# Patient Record
Sex: FEMALE | Race: BLACK OR AFRICAN AMERICAN | NOT HISPANIC OR LATINO | Employment: FULL TIME | URBAN - METROPOLITAN AREA
[De-identification: names, ages, dates, MRNs, and addresses within clinical notes are randomized per-mention and may not be internally consistent; named-entity substitution may affect disease eponyms.]

---

## 2017-01-07 ENCOUNTER — HOSPITAL ENCOUNTER (EMERGENCY)
Facility: HOSPITAL | Age: 42
Discharge: HOME | End: 2017-01-07
Attending: EMERGENCY MEDICINE

## 2017-01-07 VITALS
DIASTOLIC BLOOD PRESSURE: 72 MMHG | HEIGHT: 65 IN | BODY MASS INDEX: 38.32 KG/M2 | HEART RATE: 78 BPM | TEMPERATURE: 98 F | OXYGEN SATURATION: 99 % | SYSTOLIC BLOOD PRESSURE: 135 MMHG | WEIGHT: 230 LBS | RESPIRATION RATE: 18 BRPM

## 2017-01-07 DIAGNOSIS — T46.4X5A COUGH DUE TO ACE INHIBITOR: ICD-10-CM

## 2017-01-07 DIAGNOSIS — J06.9 UPPER RESPIRATORY TRACT INFECTION, UNSPECIFIED TYPE: Primary | ICD-10-CM

## 2017-01-07 DIAGNOSIS — R05.8 COUGH DUE TO ACE INHIBITOR: ICD-10-CM

## 2017-01-07 DIAGNOSIS — R05.9 COUGH IN ADULT: ICD-10-CM

## 2017-01-07 LAB
DEPRECATED S PYO AG THROAT QL EIA: NEGATIVE
FLUAV AG SPEC QL IA: NEGATIVE
FLUBV AG SPEC QL IA: NEGATIVE
SPECIMEN SOURCE: NORMAL

## 2017-01-07 PROCEDURE — 87081 CULTURE SCREEN ONLY: CPT

## 2017-01-07 PROCEDURE — 87400 INFLUENZA A/B EACH AG IA: CPT

## 2017-01-07 PROCEDURE — 99284 EMERGENCY DEPT VISIT MOD MDM: CPT

## 2017-01-07 PROCEDURE — 87880 STREP A ASSAY W/OPTIC: CPT

## 2017-01-07 RX ORDER — PROMETHAZINE HYDROCHLORIDE AND CODEINE PHOSPHATE 6.25; 1 MG/5ML; MG/5ML
5 SOLUTION ORAL EVERY 4 HOURS PRN
Qty: 180 ML | Refills: 0 | Status: SHIPPED | OUTPATIENT
Start: 2017-01-07 | End: 2017-01-17

## 2017-01-07 NOTE — ED PROVIDER NOTES
Encounter Date: 1/7/2017       History     Chief Complaint   Patient presents with    Cough     cough, hoarse, cold symptoms for one week, worse this morning     Review of patient's allergies indicates:   Allergen Reactions    Penicillins Nausea And Vomiting     + dizziness     HPI Comments: 42 y/o female presents to the ED with complaints of cough and hoarse for 1 week.  Cough is worse at night.  Patient denies fever, rash abdominal pain, nausea, vomiting, diarrhea, dysuria, CP, SOB, numbness, weakness, tingling, ill contacts, recent travel or any other complaints.  She rates her pain as 4/10-worse with coughing.  She has tried multiple OTC medications with no relief.      The history is provided by the patient.     Past Medical History   Diagnosis Date    Graves disease     History of kidney stones     Hypertension      No past medical history pertinent negatives.  Past Surgical History   Procedure Laterality Date    Facial reconstruction surgery      Hysterectomy      Facial reconstruction surgery      Facial reconstruction surgery       No family history on file.  Social History   Substance Use Topics    Smoking status: Former Smoker     Packs/day: 0.50    Smokeless tobacco: Never Used    Alcohol use Yes      Comment: occasionally     Review of Systems   Constitutional: Negative for chills and fever.   HENT: Negative for congestion, ear pain, rhinorrhea, sore throat and trouble swallowing.         Hoarse   Eyes: Negative for pain, discharge and redness.   Respiratory: Positive for cough. Negative for shortness of breath.    Cardiovascular: Negative for chest pain.   Gastrointestinal: Negative for abdominal pain, diarrhea, nausea and vomiting.   Genitourinary: Negative for decreased urine volume and dysuria.   Musculoskeletal: Negative for back pain, neck pain and neck stiffness.   Skin: Negative for rash.   Neurological: Negative for dizziness, weakness, light-headedness, numbness and headaches.    Psychiatric/Behavioral: Negative for confusion.       Physical Exam   Initial Vitals   BP Pulse Resp Temp SpO2   01/07/17 0848 01/07/17 0848 01/07/17 0848 01/07/17 0848 01/07/17 0848   191/90 72 16 98.1 °F (36.7 °C) 100 %     Physical Exam    Nursing note and vitals reviewed.  Constitutional: Vital signs are normal. She appears well-developed.  Non-toxic appearance. She does not appear ill.   HENT:   Head: Normocephalic and atraumatic.   Right Ear: Tympanic membrane normal.   Left Ear: Tympanic membrane normal.   Nose: Mucosal edema present.   Mouth/Throat: Oropharynx is clear and moist and mucous membranes are normal.   Eyes: Conjunctivae are normal.   Neck: Normal range of motion.   Cardiovascular: Normal rate and regular rhythm.   Pulmonary/Chest: Effort normal and breath sounds normal. She exhibits no tenderness.   Abdominal: Soft. There is no tenderness.   Neurological: She is alert and oriented to person, place, and time. Gait normal. GCS eye subscore is 4. GCS verbal subscore is 5. GCS motor subscore is 6.   Skin: Skin is warm, dry and intact. No rash noted.   Psychiatric: She has a normal mood and affect. Her speech is normal and behavior is normal. Judgment and thought content normal.         ED Course   Procedures  Labs Reviewed   THROAT SCREEN, RAPID   CULTURE, STREP A,  THROAT   INFLUENZA A AND B ANTIGEN             Medical Decision Making:   Initial Assessment:   Omkar Gan, a nontoxic/well appearing, afebrile, 41 y.o. female, presented to the ED with c/o cough. ROS positive for cough and hoarseness.  ROS negative for denies fever, rash abdominal pain, nausea, vomiting, diarrhea, dysuria, CP, SOB, numbness, weakness, tingling, ill contacts, recent travel or any other complaints. Physical exam reveals HR WNL, lungs CTA bilaterally, TM WNL, normal oropharynx.    DDX: URI, pneumonia, Flu, Strep    ED management: CXR, Influenza, Strep    Impression/Plan: CXR revealed no evidence of infiltrate,  effusion, or pneumothorax; Influenza and Strep negative; RX Phenergan with codiene, OTC Zyrtec, Mucinex DM, and Doxylamine     Patient will follow up with her PCP or the [facility] clinic in 2 days for a recheck.  Precautions on when to return to the ED given.  Patient verbalizes understanding and agrees with current treatment plan.    I have discussed this patient with Dr. Stoll who is in agreement with my assessment and plan.     Clinical Tests:   Lab Tests: Ordered and Reviewed  Radiological Study: Ordered and Reviewed              Attending Attestation:     Physician Attestation Statement for NP/PA:   I have conducted a face to face encounter with this patient in addition to the NP/PA, due to NP/PA Request    Other NP/PA Attestation Additions:    History of Present Illness: Cough and cold symptoms x 1 week, no fever    Medical Decision Making: No source of infection on exam.  Negative flu and strep and no pneumonia on CXR.  Treat symptomatically.                 ED Course     Clinical Impression:   URI, Cough        ED [facility]  01/07/17 1102       Crystal Stoll MD  01/19/17 1243

## 2017-01-07 NOTE — ED AVS SNAPSHOT
[facility] [address]  [place] [address]               Omkar Gan   2017  9:38 AM   ED    Description:  Female : 1975   Department:  [facility]           Your Care was Coordinated By:     Provider Role From To    Crystal Stoll MD Attending Provider 17 --    ED [facility] Nurse Practitioner 17 --      Reason for Visit     Cough           Diagnoses this Visit        Comments    Upper respiratory tract infection, unspecified type    -  Primary     Cough due to ACE inhibitor         Cough in adult           ED Disposition     None           To Do List           Follow-up Information     Follow up with Savanna Pelaez MD. Schedule an appointment as soon as possible for a visit in 2 days.    Specialty:  Family Medicine    Contact information:    [address] [facility] [address] [phone]         These Medications        Disp Refills Start End    promethazine-codeine 6.25-10 mg/5 ml (PHENERGAN WITH CODEINE) 6.25-10 mg/5 mL syrup 180 mL 0 2017    Take 5 mLs by mouth every 4 (four) hours as needed for Cough. - Oral      [facility]     [facility] Care [facility] -  Assistance  Registered nurses in the [facility] provide clinical advisement, health education, appointment booking, and other advisory services.  Call for this free service at [phone].             Medications           Message regarding Medications     Verify the changes and/or additions to your medication regime listed below are the same as discussed with your clinician today.  If any of these changes or additions are incorrect, please notify your healthcare provider.        START taking these NEW medications        Refills    promethazine-codeine 6.25-10 mg/5 ml (PHENERGAN WITH CODEINE) 6.25-10 mg/5 mL syrup 0    Sig: Take 5 mLs by mouth every 4 (four) hours as needed for Cough.    Class: Print    Route:  "Oral      STOP taking these medications     hydrocodone-acetaminophen 5-325mg (NORCO) 5-325 mg per tablet Take 1 tablet by mouth every 6 (six) hours as needed for Pain.    ibuprofen (ADVIL,MOTRIN) 200 MG tablet Take 2 tablets (400 mg total) by mouth every 6 (six) hours as needed for Pain.    cyanocobalamin (VITAMIN B-12) 1000 MCG tablet Take 100 mcg by mouth once daily.    multivitamin with minerals tablet Take 1 tablet by mouth once daily.           Verify that the below list of medications is an accurate representation of the medications you are currently taking.  If none reported, the list may be blank. If incorrect, please contact your healthcare provider. Carry this list with you in case of emergency.           Current Medications     promethazine-codeine 6.25-10 mg/5 ml (PHENERGAN WITH CODEINE) 6.25-10 mg/5 mL syrup Take 5 mLs by mouth every 4 (four) hours as needed for Cough.           Clinical Reference Information           Your Vitals Were     BP Pulse Temp Resp Height Weight    135/72 78 98.1 °F (36.7 °C) (Oral) 18 5' 5" (1.651 m) 104.3 kg (230 lb)    SpO2 BMI             99% 38.27 kg/m2         Allergies as of 1/7/2017        Reactions    Penicillins Nausea And Vomiting    + dizziness      Immunizations Administered on Date of Encounter - 1/7/2017     None      ED Micro, Lab, POCT     Start Ordered       Status Ordering Provider    01/07/17 0954 01/07/17 0953  Rapid strep screen  STAT      Final result     01/07/17 0954 01/07/17 0953  Influenza antigen Nasopharyngeal Swab  STAT      Final result     01/07/17 0953 01/07/17 0953  Strep A culture, throat  Once      In process       ED Imaging Orders     Start Ordered       Status Ordering Provider    01/07/17 0954 01/07/17 0953  X-Ray Chest PA And Lateral  1 time imaging      Final result         Discharge Instructions       Take OTC Zyrtec Daily, Mucinex DM as directed on package, and Doxylamine for before bedtime    Discharge References/Attachments     " URI, VIRAL, NO ABX (ADULT) (ENGLISH)      [facility] Sign-Up     Activating your [facility] account is as easy as 1-2-3!     1) Visit [email or web address], select Sign Up Now, enter this activation code and your date of birth, then select Next.  [email or web address]  Expires: 2/21/2017 10:53 AM      2) Create a username and password to use when you visit [facility] in the future and select a security question in case you lose your password and select Next.    3) Enter your e-mail address and click Sign Up!    Additional Information  If you have questions, please e-mail [email or web address] or call [phone] to talk to our [facility] staff. Remember, [facility] is NOT to be used for urgent needs. For medical emergencies, dial 911.         Smoking Cessation     If you would like to quit smoking:   You may be eligible for free services if you are a [place] resident and started smoking cigarettes before September 1, 1988.  Call the Smoking Cessation Trust (SCT) toll free at (733) 592-4289 or (104) 330-7214.   Call 5-752-QUIT-NOW if you do not meet the above criteria.             [facility] complies with applicable Federal civil rights laws and does not discriminate on the basis of race, color, national origin, age, disability, or sex.        Language Assistance Services     ATTENTION: Language assistance services are available, free of charge. Please call [phone].      ATENCIÓN: Si habla español, tiene a asencio disposición servicios gratuitos de asistencia lingüística. Llame al [phone].     CHÚ Ý: N?u b?n nói Ti?ng Vi?t, có các d?ch v? h? tr? ngôn ng? mi?n phí dành cho b?n. G?i s? [phone].

## 2017-01-09 LAB — BACTERIA THROAT CULT: NORMAL

## 2017-06-26 ENCOUNTER — CLINICAL SUPPORT (OUTPATIENT)
Dept: FAMILY MEDICINE | Facility: CLINIC | Age: 42
End: 2017-06-26

## 2017-06-26 DIAGNOSIS — Z00.00 ROUTINE GENERAL MEDICAL EXAMINATION AT A HEALTH CARE FACILITY: Primary | ICD-10-CM

## 2017-06-26 LAB
BILIRUB SERPL-MCNC: NEGATIVE MG/DL
BLOOD URINE, POC: ABNORMAL
COLOR, POC UA: YELLOW
GLUCOSE UR QL STRIP: NORMAL
KETONES UR QL STRIP: NEGATIVE
LEUKOCYTE ESTERASE URINE, POC: NEGATIVE
NITRITE, POC UA: NEGATIVE
PH, POC UA: 6
PROTEIN, POC: NEGATIVE
SPECIFIC GRAVITY, POC UA: 1.01
UROBILINOGEN, POC UA: NORMAL

## 2017-06-26 PROCEDURE — 82270 OCCULT BLOOD FECES: CPT | Mod: ,,, | Performed by: FAMILY MEDICINE

## 2017-06-26 PROCEDURE — 80061 LIPID PANEL: CPT | Mod: QW,S$GLB,, | Performed by: FAMILY MEDICINE

## 2017-06-26 PROCEDURE — 99201 PR OFFICE/OUTPT VISIT,NEW,LEVL I: CPT | Mod: 25,S$GLB,, | Performed by: FAMILY MEDICINE

## 2017-06-26 PROCEDURE — 80053 COMPREHEN METABOLIC PANEL: CPT | Mod: QW,S$GLB,, | Performed by: FAMILY MEDICINE

## 2017-06-26 PROCEDURE — 86580 TB INTRADERMAL TEST: CPT | Mod: S$GLB,,, | Performed by: FAMILY MEDICINE

## 2017-06-26 PROCEDURE — 86480 TB TEST CELL IMMUN MEASURE: CPT | Mod: S$GLB,,, | Performed by: FAMILY MEDICINE

## 2017-06-26 PROCEDURE — 81002 URINALYSIS NONAUTO W/O SCOPE: CPT | Mod: S$GLB,,, | Performed by: FAMILY MEDICINE

## 2017-06-26 PROCEDURE — 80305 DRUG TEST PRSMV DIR OPT OBS: CPT | Mod: S$GLB,,, | Performed by: FAMILY MEDICINE

## 2017-06-26 PROCEDURE — 88142 CYTOPATH C/V THIN LAYER: CPT | Mod: S$GLB,,, | Performed by: FAMILY MEDICINE

## 2017-06-26 PROCEDURE — 85025 COMPLETE CBC W/AUTO DIFF WBC: CPT | Mod: S$GLB,,, | Performed by: FAMILY MEDICINE

## 2017-06-26 PROCEDURE — 86703 HIV-1/HIV-2 1 RESULT ANTBDY: CPT | Mod: S$GLB,,, | Performed by: FAMILY MEDICINE

## 2017-06-26 NOTE — PROGRESS NOTES
Omkar has presented today for Pre-Hire screening on behalf of [facility]'s Office. Omkar Gan has completed Non-DOT Physical and Non-DOT drug screen.    Anat Morfin     Total charge $400    Also performed at this visit:  Hemoccult cards given to pt  Pap smear-sent to [facility]  Labs-sent to [facility]  TB skin placement  Urinalysis

## 2017-06-27 LAB
ALBUMIN SERPL-MCNC: 4.6 G/DL (ref 3.6–5.1)
ALBUMIN/GLOB SERPL: 1.6 (CALC) (ref 1–2.5)
ALP SERPL-CCNC: 54 U/L (ref 33–115)
ALT SERPL-CCNC: 12 U/L (ref 6–29)
AST SERPL-CCNC: 16 U/L (ref 10–30)
BASOPHILS # BLD AUTO: 64 CELLS/UL (ref 0–200)
BASOPHILS NFR BLD AUTO: 1 %
BILIRUB SERPL-MCNC: 0.5 MG/DL (ref 0.2–1.2)
BLASTS # BLD: NORMAL CELLS/UL
BLASTS NFR BLD MANUAL: NORMAL %
BUN SERPL-MCNC: 10 MG/DL (ref 7–25)
BUN/CREAT SERPL: NORMAL (CALC) (ref 6–22)
CALCIUM SERPL-MCNC: 9.6 MG/DL (ref 8.6–10.2)
CHLORIDE SERPL-SCNC: 104 MMOL/L (ref 98–110)
CHOLEST SERPL-MCNC: 183 MG/DL (ref 125–200)
CHOLEST/HDLC SERPL: 2.7 (CALC)
CO2 SERPL-SCNC: 26 MMOL/L (ref 20–31)
CREAT SERPL-MCNC: 0.79 MG/DL (ref 0.5–1.1)
EOSINOPHIL # BLD AUTO: 243 CELLS/UL (ref 15–500)
EOSINOPHIL NFR BLD AUTO: 3.8 %
ERYTHROCYTE [DISTWIDTH] IN BLOOD BY AUTOMATED COUNT: 13.8 % (ref 11–15)
GFR SERPL CREATININE-BSD FRML MDRD: 92 ML/MIN/1.73M2
GLOBULIN SER CALC-MCNC: 2.9 G/DL (CALC) (ref 1.9–3.7)
GLUCOSE SERPL-MCNC: 81 MG/DL (ref 65–99)
HCT VFR BLD AUTO: 39.6 % (ref 35–45)
HDLC SERPL-MCNC: 69 MG/DL
HGB BLD-MCNC: 13.2 G/DL (ref 11.7–15.5)
HIV 1+2 AB+HIV1 P24 AG SERPL QL IA: NORMAL
LDLC SERPL CALC-MCNC: 97 MG/DL (CALC)
LYMPHOCYTES # BLD AUTO: 1830 CELLS/UL (ref 850–3900)
LYMPHOCYTES NFR BLD AUTO: 28.6 %
MCH RBC QN AUTO: 31.1 PG (ref 27–33)
MCHC RBC AUTO-ENTMCNC: 33.2 G/DL (ref 32–36)
MCV RBC AUTO: 93.6 FL (ref 80–100)
METAMYELOCYTES # BLD: NORMAL CELLS/UL
METAMYELOCYTES NFR BLD MANUAL: NORMAL %
MONOCYTES # BLD AUTO: 499 CELLS/UL (ref 200–950)
MONOCYTES NFR BLD AUTO: 7.8 %
MYELOCYTES # BLD: NORMAL CELLS/UL
MYELOCYTES NFR BLD MANUAL: NORMAL %
NEUTROPHILS # BLD AUTO: 3763 CELLS/UL (ref 1500–7800)
NEUTROPHILS NFR BLD AUTO: 58.8 %
NEUTS BAND # BLD: NORMAL CELLS/UL (ref 0–750)
NEUTS BAND NFR BLD MANUAL: NORMAL %
NONHDLC SERPL-MCNC: 114 MG/DL (CALC)
NRBC # BLD: NORMAL CELLS/UL
NRBC BLD-RTO: NORMAL /100 WBC
PLATELET # BLD AUTO: 239 THOUSAND/UL (ref 140–400)
PMV BLD REES-ECKER: 9.3 FL (ref 7.5–12.5)
POTASSIUM SERPL-SCNC: 3.8 MMOL/L (ref 3.5–5.3)
PROMYELOCYTES # BLD: NORMAL CELLS/UL
PROMYELOCYTES NFR BLD MANUAL: NORMAL %
PROT SERPL-MCNC: 7.5 G/DL (ref 6.1–8.1)
RBC # BLD AUTO: 4.23 MILLION/UL (ref 3.8–5.1)
RPR SER QL: NORMAL
SERVICE CMNT-IMP: NORMAL
SODIUM SERPL-SCNC: 140 MMOL/L (ref 135–146)
TRIGL SERPL-MCNC: 87 MG/DL
VARIANT LYMPHS NFR BLD: NORMAL % (ref 0–10)
WBC # BLD AUTO: 6.4 THOUSAND/UL (ref 3.8–10.8)

## 2017-06-28 LAB
CTP QC/QA: YES
FECAL OCCULT BLOOD, POC: NEGATIVE
TB INDURATION - 48 HR READ: 0 MM
TB INDURATION - 72 HR READ: NORMAL MM
TB SKIN TEST - 48 HR READ: NEGATIVE
TB SKIN TEST - 72 HR READ: NORMAL

## 2017-06-29 LAB
CLINICAL INFO: NORMAL
CYTO CVX: NORMAL
CYTOLOGIST CVX/VAG CYTO: NORMAL
CYTOLOGIST CVX/VAG CYTO: NORMAL
CYTOLOGY CMNT CVX/VAG CYTO-IMP: NORMAL
DATE OF PREVIOUS PAP: NORMAL
DATE PREVIOUS BX: NORMAL
GEN CATEG CVX/VAG CYTO-IMP: NORMAL
LMP START DATE: NORMAL
MICROORGANISM CVX/VAG CYTO: NORMAL
PATHOLOGIST CVX/VAG CYTO: NORMAL
SERVICE CMNT-IMP: NORMAL
SPECIMEN SOURCE CVX/VAG CYTO: NORMAL
STAT OF ADQ CVX/VAG CYTO-IMP: NORMAL

## 2017-08-29 ENCOUNTER — HOSPITAL ENCOUNTER (EMERGENCY)
Facility: HOSPITAL | Age: 42
Discharge: HOME | End: 2017-08-29

## 2017-08-29 VITALS
HEART RATE: 77 BPM | TEMPERATURE: 99 F | WEIGHT: 230 LBS | BODY MASS INDEX: 38.32 KG/M2 | OXYGEN SATURATION: 98 % | SYSTOLIC BLOOD PRESSURE: 132 MMHG | HEIGHT: 65 IN | RESPIRATION RATE: 18 BRPM | DIASTOLIC BLOOD PRESSURE: 78 MMHG

## 2017-08-29 DIAGNOSIS — J06.9 UPPER RESPIRATORY TRACT INFECTION, UNSPECIFIED TYPE: Primary | ICD-10-CM

## 2017-08-29 DIAGNOSIS — J30.9 ACUTE ALLERGIC RHINITIS, UNSPECIFIED SEASONALITY, UNSPECIFIED TRIGGER: ICD-10-CM

## 2017-08-29 PROCEDURE — 99283 EMERGENCY DEPT VISIT LOW MDM: CPT

## 2017-08-29 RX ORDER — FLUTICASONE PROPIONATE 50 MCG
1 SPRAY, SUSPENSION (ML) NASAL 2 TIMES DAILY PRN
Qty: 15 G | Refills: 0 | Status: SHIPPED | OUTPATIENT
Start: 2017-08-29 | End: 2018-02-21

## 2017-08-29 RX ORDER — LORATADINE 10 MG/1
10 TABLET ORAL DAILY
Qty: 60 TABLET | Refills: 0 | Status: SHIPPED | OUTPATIENT
Start: 2017-08-29 | End: 2018-02-21

## 2017-08-29 NOTE — ED PROVIDER NOTES
Encounter Date: 8/29/2017       History     Chief Complaint   Patient presents with    Cough     Pt states she's been running fever, coughing, ears hurting x4 days.     42-year-old female presents to emergency room complaining of cough, congestion, sore throat, bilateral ear pain ×3 days.  Patient states she has been taking over-the-counter medications that it was relieved symptoms however symptoms return within this time for the next dose of medication.  Patient states she just feels generally fatigued and child coughing.  Denies any fever.  Denies any back aches and body aches or chills.  Denies any shortness of breath or chest pain          Review of patient's allergies indicates:   Allergen Reactions    Penicillins Nausea And Vomiting     + dizziness     Past Medical History:   Diagnosis Date    Graves disease     Graves disease     History of kidney stones     Hypertension      Past Surgical History:   Procedure Laterality Date    FACIAL RECONSTRUCTION SURGERY      FACIAL RECONSTRUCTION SURGERY      FACIAL RECONSTRUCTION SURGERY      HYSTERECTOMY       History reviewed. No pertinent family history.  Social History   Substance Use Topics    Smoking status: Former Smoker     Packs/day: 0.50    Smokeless tobacco: Never Used    Alcohol use Yes      Comment: occasionally     Review of Systems   Constitutional: Positive for fatigue. Negative for activity change, appetite change, chills and fever.   HENT: Positive for congestion, ear pain, postnasal drip, rhinorrhea and sore throat. Negative for sinus pressure.    Respiratory: Positive for cough. Negative for shortness of breath.    Cardiovascular: Negative for chest pain.   Gastrointestinal: Negative for nausea.   Genitourinary: Negative for dysuria.   Musculoskeletal: Negative for back pain.   Skin: Negative for rash.   Neurological: Negative for weakness.   Hematological: Does not bruise/bleed easily.   All other systems reviewed and are  negative.      Physical Exam     Initial Vitals [08/29/17 1601]   BP Pulse Resp Temp SpO2   132/78 77 18 98.6 °F (37 °C) 98 %      MAP       96         Physical Exam    Nursing note and vitals reviewed.  Constitutional: She appears well-developed and well-nourished. No distress.   HENT:   Head: Normocephalic.   Right Ear: Tympanic membrane normal.   Left Ear: Tympanic membrane normal.   Nose: Mucosal edema and rhinorrhea present.   Mouth/Throat: Uvula is midline and mucous membranes are normal. Posterior oropharyngeal erythema present. No oropharyngeal exudate or posterior oropharyngeal edema.   Eyes: Conjunctivae are normal.   Neck: Normal range of motion. Neck supple.   Cardiovascular: Normal rate.   Pulmonary/Chest: Breath sounds normal. No respiratory distress.   Abdominal: Soft. Bowel sounds are normal. She exhibits no distension and no abdominal bruit. There is no tenderness. There is no rebound and no guarding. No hernia.   Neurological: She is alert and oriented to person, place, and time.   Skin: Skin is warm and dry.   Psychiatric: She has a normal mood and affect. Her behavior is normal. Judgment and thought content normal.         ED Course   Procedures  Labs Reviewed - No data to display          Medical Decision Making:   Initial Assessment:   42-year-old female presents to emergency room complaining of cough, congestion, sore throat, bilateral ear pain ×3 days.  Patient states she has been taking over-the-counter medications that it was relieved symptoms however symptoms return within this time for the next dose of medication.  Patient states she just feels generally fatigued and child coughing.  Denies any fever.  Denies any back aches and body aches or chills.  Denies any shortness of breath or chest pain.  Lungs are clear bilaterally.  Heart sounds are normal.  Patient does not appear toxic or ill.  Differential Diagnosis:   URI, sinusitis, ALLERGIC rhinitis, viral syndrome  ED Management:  I  instructed the patient to continue the symptomatic treatment that she is using.  Viruses are expected last 7-10 days.  Hydrate well and rest.  I'll prescribe antihistamine and nasal spray.  If any symptoms worsen return to emergency room.  Follow-up primary care doctor if needed.  Patient verbalized understanding.              Attending Attestation:     Physician Attestation Statement for NP/PA:   I discussed this assessment and plan of this patient with the NP/PA, but I did not personally examine the patient. The face to face encounter was performed by the NP/PA.                  ED Course     Clinical Impression:   The primary encounter diagnosis was Upper respiratory tract infection, unspecified type. A diagnosis of Acute allergic rhinitis, unspecified seasonality, unspecified trigger was also pertinent to this visit.    Disposition:   Disposition: Discharged  Condition: Stable                        Nova Quiles NP  09/14/17 2023       Cain Schuler MD  09/18/17 7315

## 2017-12-11 ENCOUNTER — HOSPITAL ENCOUNTER (EMERGENCY)
Facility: HOSPITAL | Age: 42
Discharge: HOME | End: 2017-12-11
Attending: FAMILY MEDICINE

## 2017-12-11 VITALS
WEIGHT: 235 LBS | BODY MASS INDEX: 39.15 KG/M2 | HEART RATE: 80 BPM | OXYGEN SATURATION: 98 % | SYSTOLIC BLOOD PRESSURE: 172 MMHG | HEIGHT: 65 IN | DIASTOLIC BLOOD PRESSURE: 90 MMHG | RESPIRATION RATE: 18 BRPM | TEMPERATURE: 98 F

## 2017-12-11 DIAGNOSIS — J40 BRONCHITIS: Primary | ICD-10-CM

## 2017-12-11 DIAGNOSIS — R06.02 SHORTNESS OF BREATH: ICD-10-CM

## 2017-12-11 PROCEDURE — 25000242 PHARM REV CODE 250 ALT 637 W/ HCPCS: Performed by: FAMILY MEDICINE

## 2017-12-11 PROCEDURE — 94640 AIRWAY INHALATION TREATMENT: CPT

## 2017-12-11 PROCEDURE — 99284 EMERGENCY DEPT VISIT MOD MDM: CPT | Mod: 25

## 2017-12-11 RX ORDER — AZITHROMYCIN 250 MG/1
250 TABLET, FILM COATED ORAL DAILY
Qty: 6 TABLET | Refills: 0 | Status: SHIPPED | OUTPATIENT
Start: 2017-12-11 | End: 2018-02-21

## 2017-12-11 RX ORDER — IPRATROPIUM BROMIDE AND ALBUTEROL SULFATE 2.5; .5 MG/3ML; MG/3ML
3 SOLUTION RESPIRATORY (INHALATION) ONCE
Status: COMPLETED | OUTPATIENT
Start: 2017-12-11 | End: 2017-12-11

## 2017-12-11 RX ORDER — PROMETHAZINE HYDROCHLORIDE AND CODEINE PHOSPHATE 6.25; 1 MG/5ML; MG/5ML
5 SOLUTION ORAL EVERY 4 HOURS PRN
Qty: 118 ML | Refills: 0 | Status: SHIPPED | OUTPATIENT
Start: 2017-12-11 | End: 2017-12-21

## 2017-12-11 RX ADMIN — IPRATROPIUM BROMIDE AND ALBUTEROL SULFATE 3 ML: .5; 3 SOLUTION RESPIRATORY (INHALATION) at 01:12

## 2017-12-11 NOTE — ED PROVIDER NOTES
Encounter Date: 12/11/2017       History     Chief Complaint   Patient presents with    Cough    Shortness of Breath     42-year-old female patient with cough congestion no fever patient states that she's having difficulty taking deep breaths.  Patient is not a smoker.  Patient does have mucus that is difficult to get up although her cough is productive.  Activity makes it worse resting makes it better.  This is been going on for about 5 days.  Patient has no nausea vomiting diarrhea no chills or night sweats.          Review of patient's allergies indicates:   Allergen Reactions    Penicillins Nausea And Vomiting     + dizziness     Past Medical History:   Diagnosis Date    Graves disease     Graves disease     History of kidney stones     Hypertension      Past Surgical History:   Procedure Laterality Date    FACIAL RECONSTRUCTION SURGERY      FACIAL RECONSTRUCTION SURGERY      FACIAL RECONSTRUCTION SURGERY      HYSTERECTOMY       No family history on file.  Social History   Substance Use Topics    Smoking status: Former Smoker     Packs/day: 0.50    Smokeless tobacco: Never Used    Alcohol use Yes      Comment: occasionally     Review of Systems   Constitutional: Negative for fever.   HENT: Negative for sore throat.    Respiratory: Positive for cough. Negative for shortness of breath.    Cardiovascular: Negative for chest pain.   Gastrointestinal: Negative for nausea.   Genitourinary: Negative for dysuria.   Musculoskeletal: Negative for back pain.   Skin: Negative for rash.   Neurological: Negative for weakness.   Hematological: Does not bruise/bleed easily.   All other systems reviewed and are negative.      Physical Exam     Initial Vitals [12/11/17 0026]   BP Pulse Resp Temp SpO2   (!) 172/90 98 (!) 24 97.7 °F (36.5 °C) 99 %      MAP       117.33         Physical Exam    Constitutional: She appears well-developed.   HENT:   Head: Normocephalic and atraumatic.   Right Ear: External ear normal.    Left Ear: External ear normal.   Nose: Nose normal.   Mouth/Throat: Oropharynx is clear and moist.   Eyes: Conjunctivae and EOM are normal. Pupils are equal, round, and reactive to light. Right eye exhibits no discharge. Left eye exhibits no discharge.   Neck: Normal range of motion. Neck supple. No tracheal deviation present.   Cardiovascular: Normal rate, regular rhythm and normal heart sounds.   No murmur heard.  Pulmonary/Chest: No respiratory distress. She has wheezes.   Abdominal: Soft. Bowel sounds are normal.   Neurological: She is alert and oriented to person, place, and time.   Skin: Skin is warm and dry.         ED Course   Procedures  Labs Reviewed - No data to display       X-Rays:   Independently Interpreted Readings:   Other Readings:  Xray reviewed by myself and is negative. Awaiting radiology report.      Medical Decision Making:   Initial Assessment:   Patient sitting in no distress and pleasant. Patient has no other complaints other than documented.     Differential Diagnosis:   Pneumonia, sinusitis, allergies, upper respiratory illness, bronchitis                     ED Course      Clinical Impression:   The primary encounter diagnosis was Bronchitis. A diagnosis of Shortness of breath was also pertinent to this visit.                           Edson Arndt MD  12/11/17 0114

## 2018-02-21 ENCOUNTER — HOSPITAL ENCOUNTER (EMERGENCY)
Facility: HOSPITAL | Age: 43
Discharge: HOME | End: 2018-02-21

## 2018-02-21 VITALS
TEMPERATURE: 99 F | HEART RATE: 92 BPM | OXYGEN SATURATION: 100 % | SYSTOLIC BLOOD PRESSURE: 166 MMHG | DIASTOLIC BLOOD PRESSURE: 96 MMHG | RESPIRATION RATE: 18 BRPM | BODY MASS INDEX: 39.99 KG/M2 | WEIGHT: 240 LBS | HEIGHT: 65 IN

## 2018-02-21 DIAGNOSIS — M79.673 FOOT PAIN: ICD-10-CM

## 2018-02-21 PROCEDURE — 99283 EMERGENCY DEPT VISIT LOW MDM: CPT | Mod: 25

## 2018-02-21 PROCEDURE — 63600175 PHARM REV CODE 636 W HCPCS

## 2018-02-21 PROCEDURE — 96372 THER/PROPH/DIAG INJ SC/IM: CPT

## 2018-02-21 RX ORDER — NAPROXEN SODIUM 550 MG/1
550 TABLET ORAL 2 TIMES DAILY WITH MEALS
Qty: 15 TABLET | Refills: 0 | Status: SHIPPED | OUTPATIENT
Start: 2018-02-21 | End: 2021-08-20

## 2018-02-21 RX ORDER — DEXAMETHASONE SODIUM PHOSPHATE 4 MG/ML
8 INJECTION, SOLUTION INTRA-ARTICULAR; INTRALESIONAL; INTRAMUSCULAR; INTRAVENOUS; SOFT TISSUE
Status: COMPLETED | OUTPATIENT
Start: 2018-02-21 | End: 2018-02-21

## 2018-02-21 RX ADMIN — DEXAMETHASONE SODIUM PHOSPHATE 8 MG: 4 INJECTION, SOLUTION INTRAMUSCULAR; INTRAVENOUS at 05:02

## 2018-02-21 NOTE — ED NOTES
Patient states that she has right ankle pain that started approximately 1 week ago.  She states she has soaked her foot and taken OTC meds without relief.  She denies any injury or trauma to that area.    LOC: The patient is awake and alert; oriented x 3 and speaking appropriately.  APPEARANCE: Patient resting comfortably, patient is clean and well groomed  SKIN: warm and dry, normal skin turgor & moist mucus membranes, skin intact, no breakdown noted.  MUSCULOSKELETAL: Patient moving all extremities well, swelling noted to right ankle.  RESPIRATORY: Airway is open and patent, breath sounds clear throughout all lung fields; respirations are spontaneous, normal effort and rate  CARDIAC: Patient has a normal rate, no peripheral edema noted, capillary refill < 3 seconds; No complaints of chest pain   ABDOMEN: Soft and non tender to palpation, no distention noted. Bowel sounds present x 4

## 2018-02-21 NOTE — ED PROVIDER NOTES
Encounter Date: 2/21/2018       History     Chief Complaint   Patient presents with    Ankle Pain     Pt states has had pain to right ankle and foot x 6 days, states started with swelling yesterday.  Denies any trauma.      42-year-old female presents to emergency department with complaints of 6 day history of constant gradually worsening right medial foot and ankle pain.  Pain is described as sharp and stabbing and worse with movement and ambulation.  Patient denies known injury or trauma.  Patient  has significant past medical history of pes planus and hallux valgus. Pt reports slight swelling to ankle joint and reports numbness to medial foot but no tingling. Pt has tried ibuprofen without relief. PT denies PMH of gout or RA.          Review of patient's allergies indicates:   Allergen Reactions    Penicillins Nausea And Vomiting     + dizziness     Past Medical History:   Diagnosis Date    Graves disease     Graves disease     History of kidney stones     Hypertension      Past Surgical History:   Procedure Laterality Date    FACIAL RECONSTRUCTION SURGERY      FACIAL RECONSTRUCTION SURGERY      FACIAL RECONSTRUCTION SURGERY      HYSTERECTOMY       History reviewed. No pertinent family history.  Social History   Substance Use Topics    Smoking status: Former Smoker     Packs/day: 0.50    Smokeless tobacco: Never Used    Alcohol use Yes      Comment: occasionally     Review of Systems   Constitutional: Negative for chills, diaphoresis, fatigue and fever.   HENT: Negative for sore throat.    Respiratory: Negative for shortness of breath.    Cardiovascular: Negative for chest pain and leg swelling.   Gastrointestinal: Negative for abdominal pain, nausea and vomiting.   Genitourinary: Negative for dysuria.   Musculoskeletal: Positive for arthralgias and joint swelling. Negative for back pain and neck pain.   Skin: Negative for color change, rash and wound.   Neurological: Positive for numbness. Negative  for weakness.   Hematological: Does not bruise/bleed easily.   All other systems reviewed and are negative.      Physical Exam     Initial Vitals [02/21/18 1709]   BP Pulse Resp Temp SpO2   (!) 159/89 92 18 98.5 °F (36.9 °C) 100 %      MAP       112.33         Physical Exam    Nursing note and vitals reviewed.  Constitutional: Vital signs are normal. She appears well-developed and well-nourished. She is active. No distress.   HENT:   Head: Normocephalic and atraumatic.   Eyes: Conjunctivae, EOM and lids are normal.   Neck: Normal range of motion. Neck supple. No thyroid mass and no thyromegaly present.   Cardiovascular: Normal rate, regular rhythm, normal heart sounds and normal pulses.   No murmur heard.  Pulses:       Dorsalis pedis pulses are 2+ on the right side.        Posterior tibial pulses are 2+ on the right side.   Pulmonary/Chest: Effort normal and breath sounds normal. No respiratory distress. She has no decreased breath sounds. She has no wheezes. She has no rales.   Musculoskeletal: Normal range of motion.        Right foot: There is normal capillary refill.        Feet:    Patient limping slightly on right foot but ambulatory without assistive devices.  Pes planus and hallux valgus noted BL  Pain with right dorsiflexion and plantar flexion   Neurological: She is alert.   Skin: Skin is warm, dry and intact. Capillary refill takes less than 2 seconds.         ED Course   Procedures  Labs Reviewed - No data to display          Medical Decision Making:   Initial Assessment:   42-year-old female presents to emergency department with complaints of 6 day history of constant gradually worsening right medial foot and ankle pain.  Pain is described as sharp and stabbing and worse with movement and ambulation.  Patient denies known injury or trauma.  Patient  has significant past medical history of pes planus and hallux valgus. Pt reports slight swelling to ankle joint and reports numbness to medial foot but no  tingling. Pt has tried ibuprofen without relief. PT denies PMH of gout or RA.    On physical exam, right foot exam reveals tenderness palpated over 1st metatarsal and midfoot bones, no obvious swelling, pain with active dorsiflexion and plantar flexion. Pt limping slightly on right foot but ambulatory without assistive devices. DP/PT pulses 2+. Normal heart sounds and lung sounds CTA-BL with normal respiratory effort.   Differential Diagnosis:   Foot strain, ankle sprain, fracture, hallux valgus pain.  Clinical Tests:   Radiological Study: Ordered and Reviewed  ED Management:  Discussed with patient radiologic images and will prescribed anti-inflammatory medication for home.  Patient given dexamethasone injection in the ED.  An Ace wrap was placed on foot for compression and patient recommended to perform RICE treatment. The patient was given an opportunity to ask questions about the diagnosis and has voiced understanding of our discussion.  Patient is comfortable going home at this time.  I have informed patient to follow up with primary care provider or return to the emergency department for any concerns.  We discussed at length symptoms to return to the ED.  Patient vital signs are stable, they are nontoxic appearing and will be discharged home.                        Clinical Impression:   The encounter diagnosis was Foot pain.                           DARELL Eagle  02/21/18 [id number]

## 2018-03-08 ENCOUNTER — HOSPITAL ENCOUNTER (EMERGENCY)
Facility: HOSPITAL | Age: 43
Discharge: HOME | End: 2018-03-08

## 2018-03-08 VITALS
TEMPERATURE: 98 F | HEART RATE: 89 BPM | HEIGHT: 65 IN | WEIGHT: 230 LBS | RESPIRATION RATE: 20 BRPM | OXYGEN SATURATION: 100 % | DIASTOLIC BLOOD PRESSURE: 73 MMHG | SYSTOLIC BLOOD PRESSURE: 153 MMHG | BODY MASS INDEX: 38.32 KG/M2

## 2018-03-08 DIAGNOSIS — J40 BRONCHITIS: Primary | ICD-10-CM

## 2018-03-08 PROCEDURE — 99284 EMERGENCY DEPT VISIT MOD MDM: CPT

## 2018-03-08 PROCEDURE — 94640 AIRWAY INHALATION TREATMENT: CPT

## 2018-03-08 PROCEDURE — 94760 N-INVAS EAR/PLS OXIMETRY 1: CPT

## 2018-03-08 PROCEDURE — 25000242 PHARM REV CODE 250 ALT 637 W/ HCPCS: Performed by: NURSE PRACTITIONER

## 2018-03-08 PROCEDURE — 63600175 PHARM REV CODE 636 W HCPCS: Performed by: NURSE PRACTITIONER

## 2018-03-08 RX ORDER — DEXTROMETHORPHAN POLISTIREX 30 MG/5ML
60 SUSPENSION ORAL 2 TIMES DAILY
Qty: 150 ML | Refills: 0 | Status: SHIPPED | OUTPATIENT
Start: 2018-03-08 | End: 2018-03-18

## 2018-03-08 RX ORDER — AZITHROMYCIN 250 MG/1
250 TABLET, FILM COATED ORAL DAILY
Qty: 6 TABLET | Refills: 0 | Status: SHIPPED | OUTPATIENT
Start: 2018-03-08 | End: 2019-04-27 | Stop reason: SDUPTHER

## 2018-03-08 RX ORDER — ALBUTEROL SULFATE 90 UG/1
1-2 AEROSOL, METERED RESPIRATORY (INHALATION) EVERY 6 HOURS PRN
Qty: 1 INHALER | Refills: 0 | Status: SHIPPED | OUTPATIENT
Start: 2018-03-08 | End: 2021-08-20

## 2018-03-08 RX ORDER — IPRATROPIUM BROMIDE AND ALBUTEROL SULFATE 2.5; .5 MG/3ML; MG/3ML
3 SOLUTION RESPIRATORY (INHALATION)
Status: COMPLETED | OUTPATIENT
Start: 2018-03-08 | End: 2018-03-08

## 2018-03-08 RX ORDER — PREDNISONE 20 MG/1
40 TABLET ORAL
Status: COMPLETED | OUTPATIENT
Start: 2018-03-08 | End: 2018-03-08

## 2018-03-08 RX ORDER — PREDNISONE 20 MG/1
40 TABLET ORAL DAILY
Qty: 10 TABLET | Refills: 0 | Status: SHIPPED | OUTPATIENT
Start: 2018-03-08 | End: 2018-03-13

## 2018-03-08 RX ADMIN — IPRATROPIUM BROMIDE AND ALBUTEROL SULFATE 3 ML: .5; 3 SOLUTION RESPIRATORY (INHALATION) at 06:03

## 2018-03-08 RX ADMIN — PREDNISONE 40 MG: 20 TABLET ORAL at 06:03

## 2018-03-09 NOTE — ED PROVIDER NOTES
Encounter Date: 3/8/2018       History     Chief Complaint   Patient presents with    Cough     cough, cold, congestion, fever,  and loss of voice ongoing for 2 wks. states has been taking mucinex, dayquil, nyquil, and ron seltzer not helping. states cough is nonproductive but feels like something should come up.      42-year-old female presents to emergency room complaining of cough, congestion, fever, hoarseness for the past 2 weeks.  Patient states she had a fever of 100.4 last night.  Has tried multiple over-the-counter medications with little relief.  Reports several illnesses with children in the family.  Denies any shortness of breath.  Denies any history of respiratory illnesses.  Denies any change in activity or appetite.  Denies any chest pain.          Review of patient's allergies indicates:   Allergen Reactions    Penicillins Nausea And Vomiting     + dizziness     Past Medical History:   Diagnosis Date    Graves disease     Graves disease     History of kidney stones     Hypertension      Past Surgical History:   Procedure Laterality Date    FACIAL RECONSTRUCTION SURGERY      FACIAL RECONSTRUCTION SURGERY      FACIAL RECONSTRUCTION SURGERY      HYSTERECTOMY       History reviewed. No pertinent family history.  Social History   Substance Use Topics    Smoking status: Former Smoker     Packs/day: 0.50    Smokeless tobacco: Never Used    Alcohol use Yes      Comment: occasionally     Review of Systems   Constitutional: Positive for fatigue. Negative for fever.   HENT: Positive for congestion, rhinorrhea, sore throat and voice change.    Respiratory: Positive for cough. Negative for shortness of breath.    Cardiovascular: Negative for chest pain.   Gastrointestinal: Negative for nausea.   Genitourinary: Negative for dysuria.   Musculoskeletal: Negative for back pain.   Skin: Negative for rash.   Neurological: Positive for headaches. Negative for weakness.   Hematological: Does not  bruise/bleed easily.   All other systems reviewed and are negative.      Physical Exam     Initial Vitals [03/08/18 1733]   BP Pulse Resp Temp SpO2   (!) 153/73 83 16 98.2 °F (36.8 °C) 100 %      MAP       99.67         Physical Exam    Nursing note and vitals reviewed.  Constitutional: She appears well-developed and well-nourished. No distress.   HENT:   Head: Normocephalic.   Eyes: Conjunctivae are normal.   Neck: Normal range of motion. Neck supple.   Cardiovascular: Normal rate.   Pulmonary/Chest: No respiratory distress. She has wheezes.   Abdominal: Soft. Bowel sounds are normal. She exhibits no distension and no abdominal bruit. There is no tenderness. There is no rebound and no guarding. No hernia.   Neurological: She is alert and oriented to person, place, and time.   Skin: Skin is warm and dry. Capillary refill takes less than 2 seconds.   Psychiatric: She has a normal mood and affect. Her behavior is normal. Judgment and thought content normal.         ED Course   Procedures  Labs Reviewed - No data to display          Medical Decision Making:   Initial Assessment:   42-year-old female presents to emergency room complaining of cough, congestion, fever, hoarseness for the past 2 weeks.  Patient states she had a fever of 100.4 last night.  Has tried multiple over-the-counter medications with little relief.  Reports several illnesses with children in the family.  Denies any shortness of breath.  Denies any history of respiratory illnesses.  Denies any change in activity or appetite.  Denies any chest pain.  Patient is afebrile.  Some wheezing noted in bilateral lower lobes.  Vital signs are stable.  Nonproductive cough noted.  Posterior pharynx unremarkable.  TMs are normal.  Differential Diagnosis:   URI, viral syndrome, RSV, pneumonia, bronchitis, croup, GERD, influenza, strep throat  ED Management:  Lungs sounds improved with one nebulizer in the emergency department.  Patient reports feeling some relief.   Patient will be treated for bronchitis with antibiotics, inhaler, steroids and cough medication.  Instructed to follow-up with primary care doctor.  If any symptoms worsen return to the emergency department.                      Clinical Impression:   The encounter diagnosis was Bronchitis.                           Nova Quiles NP  03/16/18 [id number]

## 2019-04-27 ENCOUNTER — HOSPITAL ENCOUNTER (EMERGENCY)
Facility: HOSPITAL | Age: 44
Discharge: HOME | End: 2019-04-27
Attending: EMERGENCY MEDICINE

## 2019-04-27 VITALS
HEIGHT: 66 IN | OXYGEN SATURATION: 98 % | TEMPERATURE: 98 F | DIASTOLIC BLOOD PRESSURE: 74 MMHG | RESPIRATION RATE: 20 BRPM | HEART RATE: 77 BPM | SYSTOLIC BLOOD PRESSURE: 138 MMHG | BODY MASS INDEX: 36.96 KG/M2 | WEIGHT: 230 LBS

## 2019-04-27 DIAGNOSIS — J20.9 ACUTE BRONCHITIS, UNSPECIFIED ORGANISM: Primary | ICD-10-CM

## 2019-04-27 DIAGNOSIS — R05.9 COUGH: ICD-10-CM

## 2019-04-27 LAB — DEPRECATED S PYO AG THROAT QL EIA: NEGATIVE

## 2019-04-27 PROCEDURE — 87081 CULTURE SCREEN ONLY: CPT

## 2019-04-27 PROCEDURE — 87880 STREP A ASSAY W/OPTIC: CPT

## 2019-04-27 PROCEDURE — 99284 EMERGENCY DEPT VISIT MOD MDM: CPT | Mod: 25

## 2019-04-27 PROCEDURE — 25000003 PHARM REV CODE 250: Performed by: NURSE PRACTITIONER

## 2019-04-27 RX ORDER — BENZONATATE 100 MG/1
100 CAPSULE ORAL 3 TIMES DAILY PRN
Qty: 20 CAPSULE | Refills: 0 | Status: SHIPPED | OUTPATIENT
Start: 2019-04-27 | End: 2019-05-07

## 2019-04-27 RX ORDER — AZITHROMYCIN 250 MG/1
250 TABLET, FILM COATED ORAL DAILY
Qty: 6 TABLET | Refills: 0 | Status: SHIPPED | OUTPATIENT
Start: 2019-04-27 | End: 2019-05-07

## 2019-04-27 RX ORDER — FLUTICASONE PROPIONATE 50 MCG
1 SPRAY, SUSPENSION (ML) NASAL 2 TIMES DAILY PRN
Qty: 15 G | Refills: 0 | Status: SHIPPED | OUTPATIENT
Start: 2019-04-27 | End: 2021-08-20

## 2019-04-27 RX ORDER — IBUPROFEN 600 MG/1
600 TABLET ORAL
Status: COMPLETED | OUTPATIENT
Start: 2019-04-27 | End: 2019-04-27

## 2019-04-27 RX ADMIN — IBUPROFEN 600 MG: 600 TABLET, FILM COATED ORAL at 10:04

## 2019-04-27 NOTE — ED NOTES
44 year old female presents to ed cc of sore throat cough with productive blood tinged sputum x 2 weeks. Patient reports taking otc medications with no relief. Patient denies fever chills n/v/d at this time.

## 2019-04-27 NOTE — DISCHARGE INSTRUCTIONS
Increase your water intake. Continue mucinex. Use over-the-counter zyrtec, motrin, and tylenol to help with symptoms.  Return to the ED if your condition changes, progresses, or if you have any concerns.

## 2019-04-27 NOTE — ED PROVIDER NOTES
Encounter Date: 4/27/2019       History     Chief Complaint   Patient presents with    Sore Throat     pt presents to ED today c/o sore throat, cough, and blood in sputum x 2 week. pt reports taking OTC mucinex with no relief      43yo female presents to the ED for sore throat, nasal congestion, and productive cough for 2 weeks.  She has been using Mucinex without improvement.  She denies history of asthma and pneumonia.  No chest pain or shortness of breath. She has noticed specks of blood in her sputum.  No fever.  She does not smoke.  She is tolerating fluids without difficulty but she reports that it does hurt to swallow.  No known sick contacts.  No recent antibiotic use.  No other complaints at this time.    The history is provided by the patient.     Review of patient's allergies indicates:   Allergen Reactions    Penicillins Nausea And Vomiting     + dizziness     Past Medical History:   Diagnosis Date    Graves disease     Graves disease     History of kidney stones     Hypertension      Past Surgical History:   Procedure Laterality Date    FACIAL RECONSTRUCTION SURGERY      FACIAL RECONSTRUCTION SURGERY      FACIAL RECONSTRUCTION SURGERY      HYSTERECTOMY       No family history on file.  Social History     Tobacco Use    Smoking status: Former Smoker     Packs/day: 0.50    Smokeless tobacco: Never Used   Substance Use Topics    Alcohol use: Yes     Comment: occasionally    Drug use: No     Review of Systems   Constitutional: Positive for appetite change. Negative for chills and fever.   HENT: Positive for congestion, postnasal drip, rhinorrhea, sore throat and voice change. Negative for nosebleeds and trouble swallowing.    Respiratory: Positive for cough. Negative for shortness of breath.    Cardiovascular: Negative for chest pain.   Gastrointestinal: Negative for abdominal pain and vomiting.   Musculoskeletal: Negative for neck pain.   Skin: Negative for rash.   Allergic/Immunologic:  Negative for immunocompromised state.   Neurological: Negative for headaches.   Psychiatric/Behavioral: Negative for confusion.       Physical Exam     Initial Vitals [04/27/19 1017]   BP Pulse Resp Temp SpO2   (!) 142/93 78 19 98.2 °F (36.8 °C) 100 %      MAP       --         Physical Exam    Nursing note and vitals reviewed.  Constitutional: Vital signs are normal. She appears well-developed and well-nourished. She is Obese . She is active and cooperative. She is easily aroused.  Non-toxic appearance. She does not have a sickly appearance. She does not appear ill. No distress.   HENT:   Head: Normocephalic and atraumatic.   Right Ear: External ear normal.   Left Ear: External ear normal.   Nose: Mucosal edema and rhinorrhea present. No sinus tenderness.   Mouth/Throat: Uvula is midline and mucous membranes are normal. No oral lesions. No trismus in the jaw. No dental abscesses or uvula swelling. Posterior oropharyngeal erythema present. No oropharyngeal exudate, posterior oropharyngeal edema or tonsillar abscesses.   Postnasal drip   Eyes: Conjunctivae and EOM are normal.   Neck: Trachea normal and normal range of motion. Neck supple. No stridor present. No tracheal tenderness present. No tracheal deviation present.   Hoarse but not muffled voice.   Cardiovascular: Normal rate, regular rhythm and normal heart sounds.   Pulmonary/Chest: Effort normal and breath sounds normal. No tachypnea. She has no wheezes.   Abdominal: Soft. Normal appearance and bowel sounds are normal. She exhibits no distension. There is no tenderness. There is no rigidity, no rebound and no guarding.   Musculoskeletal:   No LE edema    Neurological: She is alert, oriented to person, place, and time and easily aroused. She has normal strength. GCS eye subscore is 4. GCS verbal subscore is 5. GCS motor subscore is 6.   Skin: Skin is warm, dry and intact. No bruising and no rash noted. No erythema.   Psychiatric: She has a normal mood and  affect. Her speech is normal and behavior is normal. Judgment and thought content normal. Cognition and memory are normal.         ED Course   Procedures  Labs Reviewed   THROAT SCREEN, RAPID          Imaging Results          X-Ray Chest PA And Lateral (Final result)  Result time 04/27/19 11:26:48    Final result by Babita Gutierrez MD (04/27/19 11:26:48)                 Impression:      No acute abnormality.      Electronically signed by: Babita Gutierrez MD  Date:    04/27/2019  Time:    11:26             Narrative:    EXAMINATION:  XR CHEST PA AND LATERAL    CLINICAL HISTORY:  Cough    TECHNIQUE:  PA and lateral views of the chest were performed.    COMPARISON:  12/11/2017    FINDINGS:  The lungs are clear, with normal appearance of pulmonary vasculature and no pleural effusion or pneumothorax.    The cardiac silhouette is normal in size. The hilar and mediastinal contours are unremarkable.    Bones are intact.                                 Medical Decision Making:   Initial Assessment:   44-year-old female here for nasal congestion, sore throat, and cough for 2 weeks.  No chest pain or shortness of breath.  Differential Diagnosis:   Bronchitis, URI, allergies, irritants, pulmonary edema, GERD, laryngitis, tracheitis, asthma, sinusitis, pneumonia, viral, COPD, medication side effect    Clinical Tests:   Lab Tests: Ordered and Reviewed  Radiological Study: Ordered and Reviewed  ED Management:  CXR, strep screen, PO motrin  This is a 44 y.o. female  who presents to the ED today with cough, congestion, symptoms consistent with an URI. Cendria Malik workup today does not show any sign of pneumonia on CXR. Likely this is a viral course that will need to run its course. The patient is feeling improved following interventions here in the ED. The patient feels back to baseline and is ready to go home. There is no need for emergent intervention at this time. I will discharge home with symptom control and given  the length of symptoms place her on an appropriate antibiotic. The patient is comfortable with this plan. After taking into careful account the historical factors and physical exam findings of the patient's presentation today, in conjunction with the empirical and objective data obtained on ED workup, no acute emergent medical condition has been identified. The patient appears to be low risk for an emergent medical condition and I feel it is safe and appropriate at this time for the patient to be discharged to follow-up as detailed in their discharge instructions for reevaluation and possible continued outpatient workup and management. Discharge and follow-up instructions discussed with the patient who expressed understanding and willingness to comply with my recommendations.    This record utilized Dragon voice recognition system.    Past medical records reviewed.   RX Zithromax, tessalon, flonase, motrin      Additional MDM:   PERC Rule:   Age is greater than or equal to 50 = 0.0  Heart Rate is greater than or equal to 100 = 0.0  SaO2 on room air < 95% = 0.0  Unilateral leg swelling = 0.0  Hemoptysis = 0.0  Recent surgery or trauma = 0.0  Prior PE or DVT =  0.0  Hormone use = 0.00  PERC Score = 0                   Clinical Impression:       ICD-10-CM ICD-9-CM   1. Acute bronchitis, unspecified organism J20.9 466.0   2. Cough R05 786.2                                Danielle Bolden, ED  04/27/19 [id number]

## 2019-04-30 LAB — BACTERIA THROAT CULT: NORMAL

## 2019-08-08 ENCOUNTER — HOSPITAL ENCOUNTER (EMERGENCY)
Facility: HOSPITAL | Age: 44
Discharge: HOME | End: 2019-08-08
Attending: SURGERY
Payer: COMMERCIAL

## 2019-08-08 VITALS
SYSTOLIC BLOOD PRESSURE: 128 MMHG | OXYGEN SATURATION: 98 % | TEMPERATURE: 99 F | HEART RATE: 88 BPM | BODY MASS INDEX: 38.32 KG/M2 | DIASTOLIC BLOOD PRESSURE: 78 MMHG | WEIGHT: 230 LBS | RESPIRATION RATE: 18 BRPM | HEIGHT: 65 IN

## 2019-08-08 DIAGNOSIS — N13.2 HYDRONEPHROSIS WITH URINARY OBSTRUCTION DUE TO RENAL CALCULUS: Primary | ICD-10-CM

## 2019-08-08 LAB
ALBUMIN SERPL BCP-MCNC: 4.2 G/DL (ref 3.5–5.2)
ALP SERPL-CCNC: 54 U/L (ref 38–126)
ALT SERPL W/O P-5'-P-CCNC: 16 U/L (ref 10–44)
ANION GAP SERPL CALC-SCNC: 3 MMOL/L (ref 8–16)
AST SERPL-CCNC: 26 U/L (ref 15–46)
BACTERIA #/AREA URNS AUTO: ABNORMAL /HPF
BASOPHILS # BLD AUTO: 0.03 K/UL (ref 0–0.2)
BASOPHILS NFR BLD: 0.4 % (ref 0–1.9)
BILIRUB SERPL-MCNC: 0.4 MG/DL (ref 0.1–1)
BILIRUB UR QL STRIP: NEGATIVE
BUN SERPL-MCNC: 11 MG/DL (ref 7–17)
CALCIUM SERPL-MCNC: 9.2 MG/DL (ref 8.7–10.5)
CHLORIDE SERPL-SCNC: 107 MMOL/L (ref 95–110)
CLARITY UR REFRACT.AUTO: ABNORMAL
CO2 SERPL-SCNC: 29 MMOL/L (ref 23–29)
COLOR UR AUTO: ABNORMAL
CREAT SERPL-MCNC: 0.71 MG/DL (ref 0.5–1.4)
DIFFERENTIAL METHOD: ABNORMAL
EOSINOPHIL # BLD AUTO: 0.3 K/UL (ref 0–0.5)
EOSINOPHIL NFR BLD: 3.6 % (ref 0–8)
ERYTHROCYTE [DISTWIDTH] IN BLOOD BY AUTOMATED COUNT: 13.5 % (ref 11.5–14.5)
EST. GFR  (AFRICAN AMERICAN): >60 ML/MIN/1.73 M^2
EST. GFR  (NON AFRICAN AMERICAN): >60 ML/MIN/1.73 M^2
GLUCOSE SERPL-MCNC: 109 MG/DL (ref 70–110)
GLUCOSE UR QL STRIP: NEGATIVE
HCT VFR BLD AUTO: 36.7 % (ref 37–48.5)
HGB BLD-MCNC: 12.2 G/DL (ref 12–16)
HGB UR QL STRIP: ABNORMAL
HYALINE CASTS UR QL AUTO: 0 /LPF
KETONES UR QL STRIP: ABNORMAL
LEUKOCYTE ESTERASE UR QL STRIP: ABNORMAL
LIPASE SERPL-CCNC: 61 U/L (ref 23–300)
LYMPHOCYTES # BLD AUTO: 2 K/UL (ref 1–4.8)
LYMPHOCYTES NFR BLD: 26.5 % (ref 18–48)
MCH RBC QN AUTO: 31.2 PG (ref 27–31)
MCHC RBC AUTO-ENTMCNC: 33.2 G/DL (ref 32–36)
MCV RBC AUTO: 94 FL (ref 82–98)
MICROSCOPIC COMMENT: ABNORMAL
MONOCYTES # BLD AUTO: 0.6 K/UL (ref 0.3–1)
MONOCYTES NFR BLD: 8.3 % (ref 4–15)
NEUTROPHILS # BLD AUTO: 4.6 K/UL (ref 1.8–7.7)
NEUTROPHILS NFR BLD: 61.2 % (ref 38–73)
NITRITE UR QL STRIP: NEGATIVE
PH UR STRIP: 7 [PH] (ref 5–8)
PLATELET # BLD AUTO: 262 K/UL (ref 150–350)
PMV BLD AUTO: 9.9 FL (ref 9.2–12.9)
POTASSIUM SERPL-SCNC: 3.4 MMOL/L (ref 3.5–5.1)
PROT SERPL-MCNC: 7.4 G/DL (ref 6–8.4)
PROT UR QL STRIP: ABNORMAL
RBC # BLD AUTO: 3.91 M/UL (ref 4–5.4)
RBC #/AREA URNS AUTO: >100 /HPF (ref 0–4)
SODIUM SERPL-SCNC: 139 MMOL/L (ref 136–145)
SP GR UR STRIP: 1.01 (ref 1–1.03)
URN SPEC COLLECT METH UR: ABNORMAL
UROBILINOGEN UR STRIP-ACNC: 1 EU/DL
WBC # BLD AUTO: 7.5 K/UL (ref 3.9–12.7)
WBC #/AREA URNS AUTO: 1 /HPF (ref 0–5)

## 2019-08-08 PROCEDURE — 96365 THER/PROPH/DIAG IV INF INIT: CPT | Mod: ER

## 2019-08-08 PROCEDURE — 99285 EMERGENCY DEPT VISIT HI MDM: CPT | Mod: 25,ER

## 2019-08-08 PROCEDURE — 96361 HYDRATE IV INFUSION ADD-ON: CPT | Mod: ER

## 2019-08-08 PROCEDURE — 83690 ASSAY OF LIPASE: CPT | Mod: ER

## 2019-08-08 PROCEDURE — 85025 COMPLETE CBC W/AUTO DIFF WBC: CPT | Mod: ER

## 2019-08-08 PROCEDURE — 63600175 PHARM REV CODE 636 W HCPCS: Mod: ER | Performed by: PHYSICIAN ASSISTANT

## 2019-08-08 PROCEDURE — 81000 URINALYSIS NONAUTO W/SCOPE: CPT | Mod: ER

## 2019-08-08 PROCEDURE — 80053 COMPREHEN METABOLIC PANEL: CPT | Mod: ER

## 2019-08-08 RX ORDER — HYDROCODONE BITARTRATE AND ACETAMINOPHEN 5; 325 MG/1; MG/1
1 TABLET ORAL EVERY 6 HOURS PRN
Qty: 12 TABLET | Refills: 0 | Status: SHIPPED | OUTPATIENT
Start: 2019-08-08 | End: 2021-08-20

## 2019-08-08 RX ORDER — ONDANSETRON 4 MG/1
4 TABLET, FILM COATED ORAL EVERY 6 HOURS PRN
Qty: 12 TABLET | Refills: 0 | Status: SHIPPED | OUTPATIENT
Start: 2019-08-08 | End: 2021-08-20

## 2019-08-08 RX ORDER — CIPROFLOXACIN 250 MG/1
250 TABLET, FILM COATED ORAL 2 TIMES DAILY
Qty: 14 TABLET | Refills: 0 | Status: SHIPPED | OUTPATIENT
Start: 2019-08-08 | End: 2019-08-15

## 2019-08-08 RX ADMIN — CEFTRIAXONE 1 G: 1 INJECTION, SOLUTION INTRAVENOUS at 04:08

## 2019-08-08 RX ADMIN — SODIUM CHLORIDE 1000 ML: 0.9 INJECTION, SOLUTION INTRAVENOUS at 04:08

## 2019-08-08 NOTE — ED PROVIDER NOTES
"Encounter Date: 8/8/2019       History     Chief Complaint   Patient presents with    Abdominal Pain     PT reports lower abdominal pain and right flank pain that started a few hours ago. Pt also reports difficulty urinating     Patient is a 44 year old female who presents with intermittent right sided flank pain for about three hours PTA. She has PMH significant for hypertension and kidney stones. She reports associated difficulty urinating and hematuria. She has not nausea or vomiting. She reports symptoms are similar to previous kidney stones but has never had "blood in my urine". She denied any vaginal discharge or vaginal bleeding. She denied fever or chills. Pain is 3/10. No alleviating or exacerbating symptoms.     The history is provided by the patient.     Review of patient's allergies indicates:   Allergen Reactions    Penicillins Nausea And Vomiting     + dizziness     Past Medical History:   Diagnosis Date    Graves disease     Graves disease     History of kidney stones     Hypertension      Past Surgical History:   Procedure Laterality Date    FACIAL RECONSTRUCTION SURGERY      FACIAL RECONSTRUCTION SURGERY      FACIAL RECONSTRUCTION SURGERY      HYSTERECTOMY       History reviewed. No pertinent family history.  Social History     Tobacco Use    Smoking status: Former Smoker     Packs/day: 0.50    Smokeless tobacco: Never Used   Substance Use Topics    Alcohol use: Yes     Comment: occasionally    Drug use: No     Review of Systems   Constitutional: Negative for activity change, appetite change, chills and fever.   HENT: Negative for congestion, rhinorrhea and sore throat.    Eyes: Negative for redness and visual disturbance.   Respiratory: Negative for cough, chest tightness and shortness of breath.    Cardiovascular: Negative for chest pain.   Gastrointestinal: Negative for abdominal pain, diarrhea, nausea and vomiting.   Genitourinary: Positive for difficulty urinating, flank pain and " hematuria. Negative for dysuria, frequency, vaginal bleeding and vaginal discharge.   Musculoskeletal: Negative for back pain, neck pain and neck stiffness.   Skin: Negative for rash.   Neurological: Negative for dizziness, syncope, numbness and headaches.       Physical Exam     Initial Vitals [08/08/19 1510]   BP Pulse Resp Temp SpO2   139/81 89 18 98.5 °F (36.9 °C) 97 %      MAP       --         Physical Exam    Nursing note and vitals reviewed.  Constitutional: She appears well-developed and well-nourished. She is cooperative.  Non-toxic appearance. She does not have a sickly appearance.   HENT:   Head: Normocephalic and atraumatic.   Right Ear: External ear normal.   Left Ear: External ear normal.   Nose: Nose normal.   Eyes: Conjunctivae and lids are normal.   Neck: Full passive range of motion without pain.   Cardiovascular: Normal rate, regular rhythm and normal heart sounds. Exam reveals no gallop and no friction rub.    No murmur heard.  Pulmonary/Chest: Breath sounds normal. She has no wheezes. She has no rhonchi. She has no rales.   Abdominal: Soft. Normal appearance. There is tenderness. There is no rigidity, no rebound and no guarding.   Right flank tenderness to palpation. No RLQ tenderness. No rebound or guarding.    Neurological: She is alert.   Skin: Skin is warm, dry and intact. No rash noted.         ED Course   Procedures  Labs Reviewed   URINALYSIS, REFLEX TO URINE CULTURE - Abnormal; Notable for the following components:       Result Value    Color, UA Orange (*)     Appearance, UA Cloudy (*)     Protein, UA 2+ (*)     Ketones, UA 1+ (*)     Occult Blood UA 3+ (*)     Leukocytes, UA 1+ (*)     All other components within normal limits    Narrative:     Preferred Collection Type->Urine, Clean Catch   CBC W/ AUTO DIFFERENTIAL - Abnormal; Notable for the following components:    RBC 3.91 (*)     Hematocrit 36.7 (*)     Mean Corpuscular Hemoglobin 31.2 (*)     All other components within normal  limits   COMPREHENSIVE METABOLIC PANEL - Abnormal; Notable for the following components:    Potassium 3.4 (*)     Anion Gap 3 (*)     All other components within normal limits   URINALYSIS MICROSCOPIC - Abnormal; Notable for the following components:    RBC, UA >100 (*)     Bacteria Few (*)     All other components within normal limits    Narrative:     Preferred Collection Type->Urine, Clean Catch   LIPASE          Imaging Results          CT Renal Stone Study ABD Pelvis WO (Final result)  Result time 08/08/19 16:14:58    Final result by Cain Philippe MD (08/08/19 16:14:58)                 Impression:      Severe left hydronephrosis with an obstructing calculus or 2 adjacent obstructing calculi within the mid to proximal left ureter.  Findings are nearly identical in appearance to the previous exam dated 06/29/2015.    All CT scans at this facility are performed  using dose modulation techniques as appropriate to performed exam including the following:  automated exposure control; adjustment of mA and/or kV according to the patients size (this includes techniques or standardized protocols for targeted exams where dose is matched to indication/reason for exam: i.e. extremities or head);  iterative reconstruction technique.      Electronically signed by: Cain Philippe  Date:    08/08/2019  Time:    16:14             Narrative:    EXAMINATION:  CT RENAL STONE STUDY ABD PELVIS WO    CLINICAL HISTORY:  Flank pain, stone disease suspected;Hematuria;.    TECHNIQUE:  Low dose axial images, sagittal and coronal reformations were obtained from the lung bases to the pubic symphysis.    COMPARISON:  06/23/2015    FINDINGS:  Lung bases are clear.    The liver is normal.  The gallbladder is normal.    The spleen is normal in size and appearance.  The pancreas is normal.  2.3 cm low-density left adrenal nodule is identified, measuring -0.2 Hounsfield units.  Finding compatible with a an adrenal adenoma and is  unchanged since the prior exam.  Right adrenal gland normal.  Aorta normal in caliber.  IVC normal.  No retroperitoneal adenopathy.    Severe left hydronephrosis is identified a large calculus or 2 adjacent calculi are identified with obstructing the mid to proximal left ureter measuring 0.6 cm in maximal conglomerate dimensions.  Findings are nearly identical in appearance in comparison the prior exam dated 06/23/2015.    The right kidney and right ureter are normal.    The stomach and small intestine are normal.  Appendix is not visualized.  No pericecal inflammatory changes in the region of the appendix.  The colon is normal.  The rectum is normal.    The pelvis demonstrates a collapsed bladder.  Uterus absent.  Adnexal regions are normal.    Regional bones demonstrate no suspicious bony abnormality.  Abdominal and pelvic wall soft tissues are normal in appearance.                                 Medical Decision Making:   History:   Old Medical Records: I decided to obtain old medical records.  Clinical Tests:   Lab Tests: Ordered and Reviewed  Radiological Study: Ordered and Reviewed  ED Management:  Urgent evaluation of a 44-year-old female who presents with right-sided flank pain. She reports difficulty urinating.  Symptoms started a few hours prior to arrival to the ER.  Vital signs are stable.  She has gross hematuria noted. No nausea or vomiting.  She has right flank tenderness to palpation.  No rebound or guarding.  Urinalysis shows hematuria.  Labs show normal renal function.  No leukocytosis.  CT shows left-sided hydronephrosis and possible renal calculi causing obstruction.  Results are similar to a previous CT in 2015.  She has pain today on the right side.  I doubt acute appendicitis.  I am unsure if these findings are acute or possibly just chronic changes either way, we will treat for possible stone with small amount of bacteria.  She was given IV Rocephin and will be discharged on antibiotics.   Recommend she follow up closely with urology. Discussed results with patient. Return precautions given. Based on my clinical evaluation, I do not appreciate any immediate, emergent, or life threatening condition or etiology that warrants additional workup today and feel that the patient can be discharged with close follow up care.  Patient is to follow up with their primary care provider. Case was discussed with Dr. Robert who is in agreement with the plan of care. All questions answered.                         Clinical Impression:       ICD-10-CM ICD-9-CM   1. Hydronephrosis with urinary obstruction due to renal calculus N13.2 591     592.0                                Luz Correa PA-C  08/08/19 1757

## 2019-08-08 NOTE — ED TRIAGE NOTES
PT reports lower abdominal pain and right flank pain that started a few hours ago. Pt also reports difficulty urinating

## 2020-01-20 ENCOUNTER — OFFICE VISIT (OUTPATIENT)
Dept: URGENT CARE | Facility: CLINIC | Age: 45
End: 2020-01-20
Payer: COMMERCIAL

## 2020-01-20 VITALS
HEIGHT: 65 IN | BODY MASS INDEX: 40.82 KG/M2 | WEIGHT: 245 LBS | TEMPERATURE: 97 F | DIASTOLIC BLOOD PRESSURE: 98 MMHG | OXYGEN SATURATION: 98 % | RESPIRATION RATE: 16 BRPM | HEART RATE: 72 BPM | SYSTOLIC BLOOD PRESSURE: 152 MMHG

## 2020-01-20 DIAGNOSIS — J30.2 SEASONAL ALLERGIC RHINITIS, UNSPECIFIED TRIGGER: Primary | ICD-10-CM

## 2020-01-20 DIAGNOSIS — R03.0 ELEVATED BLOOD PRESSURE READING: ICD-10-CM

## 2020-01-20 PROCEDURE — 99214 PR OFFICE/OUTPT VISIT, EST, LEVL IV, 30-39 MIN: ICD-10-PCS | Mod: S$GLB,,, | Performed by: FAMILY MEDICINE

## 2020-01-20 PROCEDURE — 99214 OFFICE O/P EST MOD 30 MIN: CPT | Mod: S$GLB,,, | Performed by: FAMILY MEDICINE

## 2020-01-20 PROCEDURE — 96372 THER/PROPH/DIAG INJ SC/IM: CPT | Mod: S$GLB,,, | Performed by: FAMILY MEDICINE

## 2020-01-20 PROCEDURE — 96372 PR INJECTION,THERAP/PROPH/DIAG2ST, IM OR SUBCUT: ICD-10-PCS | Mod: S$GLB,,, | Performed by: FAMILY MEDICINE

## 2020-01-20 RX ORDER — BETAMETHASONE SODIUM PHOSPHATE AND BETAMETHASONE ACETATE 3; 3 MG/ML; MG/ML
6 INJECTION, SUSPENSION INTRA-ARTICULAR; INTRALESIONAL; INTRAMUSCULAR; SOFT TISSUE
Status: COMPLETED | OUTPATIENT
Start: 2020-01-20 | End: 2020-01-20

## 2020-01-20 RX ADMIN — BETAMETHASONE SODIUM PHOSPHATE AND BETAMETHASONE ACETATE 6 MG: 3; 3 INJECTION, SUSPENSION INTRA-ARTICULAR; INTRALESIONAL; INTRAMUSCULAR; SOFT TISSUE at 03:01

## 2020-01-20 NOTE — PROGRESS NOTES
"Subjective:       Patient ID: Omkar Gan is a 44 y.o. female.    Vitals:  height is 5' 5" (1.651 m) and weight is 111.1 kg (245 lb). Her temperature is 97 °F (36.1 °C). Her blood pressure is 152/98 (abnormal) and her pulse is 72. Her respiration is 16 and oxygen saturation is 98%.     Chief Complaint: Sinus Problem    Sinus Problem   This is a new problem. The current episode started 1 to 4 weeks ago (3 weeks). The problem has been gradually worsening since onset. There has been no fever. Her pain is at a severity of 3/10. The pain is mild. Associated symptoms include congestion and sinus pressure. Pertinent negatives include no chills, coughing, diaphoresis, ear pain, shortness of breath or sore throat. Past treatments include oral decongestants. The treatment provided mild relief.       Constitution: Negative for chills, sweating, fatigue and fever.   HENT: Positive for congestion, sinus pain and sinus pressure. Negative for ear pain, sore throat and voice change.    Neck: Negative for painful lymph nodes.   Eyes: Negative for eye redness.   Respiratory: Negative for chest tightness, cough, sputum production, bloody sputum, COPD, shortness of breath, stridor, wheezing and asthma.    Gastrointestinal: Negative for nausea and vomiting.   Musculoskeletal: Negative for muscle ache.   Skin: Negative for rash.   Allergic/Immunologic: Negative for seasonal allergies and asthma.   Hematologic/Lymphatic: Negative for swollen lymph nodes.       Objective:      Physical Exam   Constitutional: She is oriented to person, place, and time. She appears well-developed and well-nourished. She is cooperative.  Non-toxic appearance. She does not have a sickly appearance. She does not appear ill. No distress.   HENT:   Head: Normocephalic and atraumatic.   Right Ear: Hearing, tympanic membrane, external ear and ear canal normal.   Left Ear: Hearing, tympanic membrane, external ear and ear canal normal.   Nose: Mucosal edema and " rhinorrhea present. No nasal deformity. No epistaxis. Right sinus exhibits no maxillary sinus tenderness and no frontal sinus tenderness. Left sinus exhibits no maxillary sinus tenderness and no frontal sinus tenderness.   Mouth/Throat: Uvula is midline, oropharynx is clear and moist and mucous membranes are normal. No trismus in the jaw. Normal dentition. No uvula swelling. No oropharyngeal exudate, posterior oropharyngeal edema or posterior oropharyngeal erythema.   Eyes: Conjunctivae and lids are normal. No scleral icterus.   Neck: Trachea normal, full passive range of motion without pain and phonation normal. Neck supple. No neck rigidity. No edema and no erythema present.   Cardiovascular: Normal rate, regular rhythm, normal heart sounds, intact distal pulses and normal pulses.   Pulmonary/Chest: Effort normal and breath sounds normal. No respiratory distress. She has no decreased breath sounds. She has no rhonchi.   Abdominal: Normal appearance.   Musculoskeletal: Normal range of motion. She exhibits no edema or deformity.   Neurological: She is alert and oriented to person, place, and time. She exhibits normal muscle tone. Coordination normal.   Skin: Skin is warm, dry, intact, not diaphoretic and not pale.   Psychiatric: She has a normal mood and affect. Her speech is normal and behavior is normal. Judgment and thought content normal. Cognition and memory are normal.   Nursing note and vitals reviewed.        Assessment:       1. Seasonal allergic rhinitis, unspecified trigger    2. Elevated blood pressure reading        Plan:         Seasonal allergic rhinitis, unspecified trigger  -     betamethasone acetate-betamethasone sodium phosphate injection 6 mg    Elevated blood pressure reading

## 2020-01-20 NOTE — LETTER
January 20, 2020      [facility]  [address]  [place] [address]  Phone: [phone]  Fax: [phone]       Patient: Omkar Gan   YOB: 1975  Date of Visit: 01/20/2020    To Whom It May Concern:    Radha Gan  was at [facility] on 01/20/2020. She may return to work/school on 01/22/2020 with no restrictions. If you have any questions or concerns, or if I can be of further assistance, please do not hesitate to contact me.    Sincerely,    Wallace Kim MA

## 2020-08-19 ENCOUNTER — OFFICE VISIT (OUTPATIENT)
Dept: URGENT CARE | Facility: CLINIC | Age: 45
End: 2020-08-19
Payer: COMMERCIAL

## 2020-08-19 VITALS
OXYGEN SATURATION: 99 % | SYSTOLIC BLOOD PRESSURE: 151 MMHG | HEIGHT: 65 IN | TEMPERATURE: 98 F | RESPIRATION RATE: 16 BRPM | WEIGHT: 235 LBS | DIASTOLIC BLOOD PRESSURE: 88 MMHG | BODY MASS INDEX: 39.15 KG/M2 | HEART RATE: 76 BPM

## 2020-08-19 DIAGNOSIS — N20.0 KIDNEY STONE: Primary | ICD-10-CM

## 2020-08-19 DIAGNOSIS — R10.9 FLANK PAIN: ICD-10-CM

## 2020-08-19 LAB
BILIRUB UR QL STRIP: NEGATIVE
GLUCOSE UR QL STRIP: NEGATIVE
KETONES UR QL STRIP: NEGATIVE
LEUKOCYTE ESTERASE UR QL STRIP: POSITIVE
PH, POC UA: 7 (ref 5–8)
POC BLOOD, URINE: NEGATIVE
POC NITRATES, URINE: NEGATIVE
PROT UR QL STRIP: NEGATIVE
SP GR UR STRIP: 1.02 (ref 1–1.03)
UROBILINOGEN UR STRIP-ACNC: NORMAL (ref 0.1–1.1)

## 2020-08-19 PROCEDURE — 87086 URINE CULTURE/COLONY COUNT: CPT

## 2020-08-19 PROCEDURE — 81003 POCT URINALYSIS, DIPSTICK, MANUAL, W/O SCOPE: ICD-10-PCS | Mod: QW,S$GLB,, | Performed by: PHYSICIAN ASSISTANT

## 2020-08-19 PROCEDURE — 96372 THER/PROPH/DIAG INJ SC/IM: CPT | Mod: S$GLB,,, | Performed by: PHYSICIAN ASSISTANT

## 2020-08-19 PROCEDURE — 99214 PR OFFICE/OUTPT VISIT, EST, LEVL IV, 30-39 MIN: ICD-10-PCS | Mod: 25,S$GLB,, | Performed by: PHYSICIAN ASSISTANT

## 2020-08-19 PROCEDURE — 99214 OFFICE O/P EST MOD 30 MIN: CPT | Mod: 25,S$GLB,, | Performed by: PHYSICIAN ASSISTANT

## 2020-08-19 PROCEDURE — 81003 URINALYSIS AUTO W/O SCOPE: CPT | Mod: QW,S$GLB,, | Performed by: PHYSICIAN ASSISTANT

## 2020-08-19 PROCEDURE — 96372 PR INJECTION,THERAP/PROPH/DIAG2ST, IM OR SUBCUT: ICD-10-PCS | Mod: S$GLB,,, | Performed by: PHYSICIAN ASSISTANT

## 2020-08-19 RX ORDER — HYDROCODONE BITARTRATE AND ACETAMINOPHEN 5; 325 MG/1; MG/1
1 TABLET ORAL EVERY 8 HOURS PRN
Qty: 12 TABLET | Refills: 0 | Status: SHIPPED | OUTPATIENT
Start: 2020-08-19 | End: 2020-08-23

## 2020-08-19 RX ORDER — TAMSULOSIN HYDROCHLORIDE 0.4 MG/1
0.4 CAPSULE ORAL DAILY
Qty: 30 CAPSULE | Refills: 0 | Status: SHIPPED | OUTPATIENT
Start: 2020-08-19 | End: 2020-09-04

## 2020-08-19 RX ORDER — HYDROCHLOROTHIAZIDE 12.5 MG/1
TABLET ORAL
COMMUNITY
End: 2021-08-20

## 2020-08-19 RX ORDER — DULOXETIN HYDROCHLORIDE 30 MG/1
30 CAPSULE, DELAYED RELEASE ORAL
COMMUNITY
End: 2021-08-20

## 2020-08-19 RX ORDER — OXYCODONE AND ACETAMINOPHEN 5; 325 MG/1; MG/1
1 TABLET ORAL
COMMUNITY
Start: 2013-06-23 | End: 2021-08-20

## 2020-08-19 RX ORDER — SULFAMETHOXAZOLE AND TRIMETHOPRIM 800; 160 MG/1; MG/1
1 TABLET ORAL 2 TIMES DAILY
Qty: 14 TABLET | Refills: 0 | Status: SHIPPED | OUTPATIENT
Start: 2020-08-19 | End: 2020-08-26

## 2020-08-19 RX ORDER — KETOROLAC TROMETHAMINE 30 MG/ML
30 INJECTION, SOLUTION INTRAMUSCULAR; INTRAVENOUS
Status: COMPLETED | OUTPATIENT
Start: 2020-08-19 | End: 2020-08-19

## 2020-08-19 RX ORDER — ONDANSETRON 4 MG/1
4 TABLET, ORALLY DISINTEGRATING ORAL EVERY 8 HOURS PRN
Qty: 15 TABLET | Refills: 0 | Status: SHIPPED | OUTPATIENT
Start: 2020-08-19 | End: 2021-08-20

## 2020-08-19 RX ORDER — KETOROLAC TROMETHAMINE 10 MG/1
10 TABLET, FILM COATED ORAL EVERY 6 HOURS
Qty: 20 TABLET | Refills: 0 | Status: SHIPPED | OUTPATIENT
Start: 2020-08-19 | End: 2020-08-24

## 2020-08-19 RX ADMIN — KETOROLAC TROMETHAMINE 30 MG: 30 INJECTION, SOLUTION INTRAMUSCULAR; INTRAVENOUS at 01:08

## 2020-08-19 NOTE — PATIENT INSTRUCTIONS
PLEASE READ YOUR DISCHARGE INSTRUCTIONS ENTIRELY AS IT CONTAINS IMPORTANT INFORMATION.  You received an injection of a powerful NSAID today (Toradol).  Its effects will last up to 24 hours.  Please do not take another NSAID (ie aspirin, ibuprofen, Aleve, Advil or Motrin) until this time tomorrow.  If you continue to have pain, you may take Tylenol (acetaminophen) if you are not allergic to this medication.  Start the flomax & antibiotic (Bactrim) today.  Do not take the toradol pills until tomorrow.  You may take the pain medication tonight if needed.  - Rest.    - Drink plenty of fluids.    - Tylenol or Ibuprofen as directed as needed for fever/pain.    - If you were prescribed antibiotics, please take them to completion.  - If you are female and on birth control pills - please use additional methods of contraception to prevent pregnancy while on antibiotics and for one cycle after.   - If you were prescribed a narcotic medication or muscle relaxer, do not drive or operate heavy equipment or machinery while taking these medications, as they can cause drowsiness.   - If you smoke, please stop smoking.  -You must understand that you've received an Urgent Care treatment only and that you may be released before all your medical problems are known or treated. You, the patient, will    arrange for follow up care as instructed. Please arrange follow up with your primary medical clinic as soon as possible.   - Follow up with your PCP or specialty clinic as directed in the next 1-2 weeks if not improved or as needed.  You can call [phone] to schedule an appointment with the appropriate provider.    - Please return to Urgent Care or to the Emergency Department if your symptoms worsen.    Patient aware and verbalized understanding.    Understanding Kidney Stones  Your kidneys are bean-shaped organs. They help filter extra salts, waste, and water from your body. You need to drink enough water every day to help flush the  extra salts into your urine.     What are kidney stones?  Kidney stones are made up of chemical crystals that separate out from urine. These crystals clump together to make stones. They form in the calyx of the kidney. They may stay in the kidney or move into the urinary tract.   Why kidney stones form  Kidneys form stones for many reasons. If you dont drink enough water, for instance, you wont have enough urine to dilute chemicals. Then the chemicals may form crystals, which can develop into stones. Here are some reasons why kidney stones form:  · Fluid loss (dehydration). This can concentrate urine, causing stones to form.  · Certain foods. Some foods contain large amounts of the chemicals that sometimes crystallize into stones. Eating foods that contain a lot of meat or salt can lead to more kidney stones.  · Kidney infections. These infections foster stones by slowing urine flow or changing the acid balance of your urine.  · Family history. If family members have had kidney stones, youre more likely to have them, too.  · A lack of certain substances in your urine. Some substances can help protect you from forming stones. If you dont have enough of these in your urine, stone formation can increase.  Where stones form  Stones begin in the cup-shaped part of the kidney (calyx). Some stay in the calyx and grow. Others move into the kidney, pelvis, or into the ureter. There they can lodge, block the flow of urine, and cause pain.  Symptoms  Many stones cause sudden and severe pain and bloody urine. Others cause nausea or frequent, burning urination. Symptoms often depend on your stones size and location. Fever may indicate a serious infection. Call your healthcare provider right away if you develop a fever.  Date Last Reviewed: 1/1/2017  © 6140-0473 [employer]. [address] [place] [address], [place] [address]. All rights reserved. This information is not intended as a substitute for professional medical  care. Always follow your healthcare professional's instructions.        Preventing Kidney Stones  If youve had a kidney stone, you may worry that youll have another. Removing or passing your stone doesnt prevent future stones. But with your healthcare providers help, you can reduce your risk of forming new stones. Follow up with your healthcare provider to help find new stones. You may need follow-up every 3 months to a year for a lifetime.    Drink lots of water  Staying well-hydrated is the best way to reduce your risk of future stones. Drink 8 12-ounce glasses of water daily. Have 2 with each meal and 2 between meals. Try keeping a pitcher of water nearby during the day and at night.  Take medicines if needed  Medicines, including vitamins and minerals, may be prescribed for certain types of stones. You may want to write your doses and medicine times on a calendar. Some medicines decrease stone-forming chemicals in your blood. Others help prevent those chemicals from crystallizing in urine. Still others help keep a normal acid balance in your urine.  Follow your prescribed diet  Your healthcare provider will tell you which foods contain the chemicals you should avoid. Your healthcare provider may also suggest talking to a dietitian. He or she can help you plan meals youll enjoy. These meals wont put you at risk for future stones. You may be told to limit certain foods, depending on which type of stones youve had. You should limit the amount of salt in your food to about 2 grams a day. This will help prevent most types of kidney stones. Make sure you get an adequate amount of calcium in your diet.  For calcium oxalate stones: Limit animal protein, such as meat, eggs, and fish. Limit grapefruit juice and alcohol. Limit high-oxalate foods (such as cola, tea, chocolate, spinach, rhubarb, wheat bran, and peanuts).  For uric acid stones: Limit high-purine foods, such as mushrooms, peas, beans, anchovies, meat,  poultry, shellfish, and organ meats. These foods increase uric acid production.  For cystine stones: Limit high-methionine foods (fish is the most common, but eggs and meats, also). These foods increase production of cystine.  Date Last Reviewed: 2/1/2017  © 6308-1560 [employer]. [address] [place] [address]. All rights reserved. This information is not intended as a substitute for professional medical care. Always follow your healthcare professional's instructions.        Urine Strainer  You may be trying to pass a kidney stone. A urine strainer is used to collect a kidney stone from the urine stream.  Collection method  · Each time you urinate, do so in a jar.  · Pour the urine from the jar through the strainer and into the toilet.  · Save any particles or stones.  · Wash your hands when you are done.  · Continue doing this until 24 hours after your pain stops. By then, if there was a kidney stone, it should have passed out of your bladder. Some stones dissolve into sandlike particles. These pass right through the strainer. In that case, you won't ever see a stone.  Follow-up care  Save any stone that you find in a small container, and bring it to your healthcare provider for analysis. This will help your healthcare provider find out what type of stone was passed. This information may be used to help prevent certain types of stones from forming again.  When to seek medical advice  Contact your healthcare provider right away if symptoms of a kidney stone return, such as:  · Bad pain in your lower abdomen or lower back  · Fever of 100.4°F (38°C) or higher, or as directed by your healthcare provider  · Vomiting  · Bloody or bad-smelling urine  · Increased or worsened pain with urination  Date Last Reviewed: 7/26/2015  © 4268-6799 [employer]. [address] [place] [address]. All rights reserved. This information is not intended as a substitute for professional  medical care. Always follow your healthcare professional's instructions.

## 2020-08-19 NOTE — LETTER
August 19, 2020      [facility]  [address] [place] [address]  Phone: [phone]  Fax: [phone]       Patient: Omkar Gan   YOB: 1975  Date of Visit: 08/19/2020    To Whom It May Concern:    Radha Gan  was at [facility] on 08/19/2020. She may return to work/school on 8/22/2020 with no restrictions. If you have any questions or concerns, or if I can be of further assistance, please do not hesitate to contact me.    Sincerely,    Lashonda Masterson PA-C

## 2020-08-19 NOTE — PROGRESS NOTES
"Subjective:       Patient ID: Omkar Gan is a 45 y.o. female.    Vitals:  height is 5' 5" (1.651 m) and weight is 106.6 kg (235 lb). Her temporal temperature is 98 °F (36.7 °C). Her blood pressure is 151/88 (abnormal) and her pulse is 76. Her respiration is 16 and oxygen saturation is 99%.     Chief Complaint: Flank Pain (left)    Ms. Gan presents for evaluation of left flank pain, nausea, and urinary frequency.  She has a hx of multiple episodes of kidney stones in the past.  She reports left flank pain that wraps around her abdomen to the pelvic area.  She denies any hematuria, dysuria, or decreased urine output.  She denies any fevers, chills, vomiting, vaginal symptoms.  She reports this feels similar to previous episodes of kidney stones.  She denies passing a stone since this pain started.  She has been taking tylenol for her symptoms with little relief.     Other  This is a new problem. The current episode started in the past 7 days (08/16/2020). The problem occurs constantly. The problem has been gradually worsening. Associated symptoms include fatigue and nausea. Pertinent negatives include no abdominal pain, chest pain, chills, coughing, diaphoresis, fever, headaches, rash or vomiting. Nothing aggravates the symptoms. She has tried acetaminophen for the symptoms. The treatment provided no relief.       Constitution: Positive for fatigue. Negative for chills, sweating, fever and generalized weakness.   Neck: Negative for painful lymph nodes.   Cardiovascular: Negative for chest trauma, chest pain, leg swelling, palpitations, sob on exertion and passing out.   Respiratory: Negative for cough and shortness of breath.    Gastrointestinal: Positive for nausea. Negative for abdominal pain, vomiting and diarrhea.   Genitourinary: Positive for frequency and flank pain. Negative for dysuria, urgency, urine decreased, bladder incontinence, bed wetting, hematuria, history of kidney stones, painful " menstruation, irregular menstruation, missed menses, heavy menstrual bleeding, ovarian cysts, genital trauma, vaginal pain, vaginal discharge, vaginal bleeding, vaginal odor, painful intercourse, genital sore, painful ejaculation and pelvic pain.   Musculoskeletal: Negative for back pain.   Skin: Negative for rash and lesion.   Neurological: Negative for dizziness and headaches.   Hematologic/Lymphatic: Negative for swollen lymph nodes.       Objective:      Physical Exam   Constitutional: She is oriented to person, place, and time. She appears well-developed.   HENT:   Head: Normocephalic and atraumatic.   Ears:   Right Ear: External ear normal.   Left Ear: External ear normal.   Nose: Nose normal.   Mouth/Throat: Mucous membranes are normal.   Eyes: Conjunctivae and lids are normal.   Neck: Trachea normal and full passive range of motion without pain. Neck supple.   Cardiovascular: Normal rate, regular rhythm and normal heart sounds.   Pulmonary/Chest: Effort normal and breath sounds normal. No stridor. No respiratory distress. She has no decreased breath sounds. She has no wheezes. She has no rhonchi. She has no rales.   Abdominal: Soft. Normal appearance and bowel sounds are normal. She exhibits no distension, no abdominal bruit, no pulsatile midline mass and no mass. There is no abdominal tenderness. There is left CVA tenderness. There is no rebound, no guarding, no tenderness at McBurney's point, negative Escalona's sign, negative Rovsing's sign, negative psoas sign, no right CVA tenderness and negative obturator sign.      Comments: Abd is soft, ND.  No rebound or guarding.  L CVA TTP.   Musculoskeletal: Normal range of motion.   Neurological: She is alert and oriented to person, place, and time. She has normal strength.   Skin: Skin is warm, dry, intact, not diaphoretic and not pale. Psychiatric: Her speech is normal and behavior is normal. Judgment and thought content normal.   Nursing note and vitals  reviewed.        Results for orders placed or performed in visit on 08/19/20   POCT Urinalysis, Dipstick, Manual, W/O Scope   Result Value Ref Range    POC Blood, Urine Negative Negative    POC Bilirubin, Urine Negative Negative    POC Urobilinogen, Urine normal 0.1 - 1.1    POC Ketones, Urine Negative Negative    POC Protein, Urine Negative Negative    POC Nitrates, Urine Negative Negative    POC Glucose, Urine Negative Negative    pH, UA 7.0 5 - 8    POC Specific Gravity, Urine 1.025 1.003 - 1.029    POC Leukocytes, Urine Positive (A) Negative       Assessment:       1. Kidney stone    2. Flank pain        Plan:         Kidney stone  -     Ambulatory referral/consult to Urology    Flank pain  -     POCT Urinalysis, Dipstick, Manual, W/O Scope  -     Urine culture    Other orders  -     sulfamethoxazole-trimethoprim 800-160mg (BACTRIM DS) 800-160 mg Tab; Take 1 tablet by mouth 2 (two) times daily. for 7 days  Dispense: 14 tablet; Refill: 0  -     tamsulosin (FLOMAX) 0.4 mg Cap; Take 1 capsule (0.4 mg total) by mouth once daily.  Dispense: 30 capsule; Refill: 0  -     ketorolac injection 30 mg  -     ketorolac (TORADOL) 10 mg tablet; Take 1 tablet (10 mg total) by mouth every 6 (six) hours. for 5 days  Dispense: 20 tablet; Refill: 0  -     HYDROcodone-acetaminophen (NORCO) 5-325 mg per tablet; Take 1 tablet by mouth every 8 (eight) hours as needed for Pain.  Dispense: 12 tablet; Refill: 0  -     ondansetron (ZOFRAN-ODT) 4 MG TbDL; Take 1 tablet (4 mg total) by mouth every 8 (eight) hours as needed (nausea).  Dispense: 15 tablet; Refill: 0    45yoF with wrapping flank pain x 2 days with hx of multiple episodes of kidney stones.  UA with 25 leuks, will send cx & cover with abx.  She has never seen urology, made appt for next week with urologist.  Given urine strainer.  ER precautions & red flag warning were discussed with the patient. The patient verbalized understanding of the treatment plan & is in agreement.   I  checked the , no recent narcotic fills.  Patient Instructions   PLEASE READ YOUR DISCHARGE INSTRUCTIONS ENTIRELY AS IT CONTAINS IMPORTANT INFORMATION.  You received an injection of a powerful NSAID today (Toradol).  Its effects will last up to 24 hours.  Please do not take another NSAID (ie aspirin, ibuprofen, Aleve, Advil or Motrin) until this time tomorrow.  If you continue to have pain, you may take Tylenol (acetaminophen) if you are not allergic to this medication.  Start the flomax & antibiotic (Bactrim) today.  Do not take the toradol pills until tomorrow.  You may take the pain medication tonight if needed.  - Rest.    - Drink plenty of fluids.    - Tylenol or Ibuprofen as directed as needed for fever/pain.    - If you were prescribed antibiotics, please take them to completion.  - If you are female and on birth control pills - please use additional methods of contraception to prevent pregnancy while on antibiotics and for one cycle after.   - If you were prescribed a narcotic medication or muscle relaxer, do not drive or operate heavy equipment or machinery while taking these medications, as they can cause drowsiness.   - If you smoke, please stop smoking.  -You must understand that you've received an Urgent Care treatment only and that you may be released before all your medical problems are known or treated. You, the patient, will    arrange for follow up care as instructed. Please arrange follow up with your primary medical clinic as soon as possible.   - Follow up with your PCP or specialty clinic as directed in the next 1-2 weeks if not improved or as needed.  You can call [phone] to schedule an appointment with the appropriate provider.    - Please return to Urgent Care or to the Emergency Department if your symptoms worsen.    Patient aware and verbalized understanding.    Understanding Kidney Stones  Your kidneys are bean-shaped organs. They help filter extra salts, waste, and water from  your body. You need to drink enough water every day to help flush the extra salts into your urine.     What are kidney stones?  Kidney stones are made up of chemical crystals that separate out from urine. These crystals clump together to make stones. They form in the calyx of the kidney. They may stay in the kidney or move into the urinary tract.   Why kidney stones form  Kidneys form stones for many reasons. If you dont drink enough water, for instance, you wont have enough urine to dilute chemicals. Then the chemicals may form crystals, which can develop into stones. Here are some reasons why kidney stones form:  · Fluid loss (dehydration). This can concentrate urine, causing stones to form.  · Certain foods. Some foods contain large amounts of the chemicals that sometimes crystallize into stones. Eating foods that contain a lot of meat or salt can lead to more kidney stones.  · Kidney infections. These infections foster stones by slowing urine flow or changing the acid balance of your urine.  · Family history. If family members have had kidney stones, youre more likely to have them, too.  · A lack of certain substances in your urine. Some substances can help protect you from forming stones. If you dont have enough of these in your urine, stone formation can increase.  Where stones form  Stones begin in the cup-shaped part of the kidney (calyx). Some stay in the calyx and grow. Others move into the kidney, pelvis, or into the ureter. There they can lodge, block the flow of urine, and cause pain.  Symptoms  Many stones cause sudden and severe pain and bloody urine. Others cause nausea or frequent, burning urination. Symptoms often depend on your stones size and location. Fever may indicate a serious infection. Call your healthcare provider right away if you develop a fever.  Date Last Reviewed: 1/1/2017  © 8456-8675 [employer]. [address] [place] [address], [place] [address]. All rights reserved. This  information is not intended as a substitute for professional medical care. Always follow your healthcare professional's instructions.        Preventing Kidney Stones  If youve had a kidney stone, you may worry that youll have another. Removing or passing your stone doesnt prevent future stones. But with your healthcare providers help, you can reduce your risk of forming new stones. Follow up with your healthcare provider to help find new stones. You may need follow-up every 3 months to a year for a lifetime.    Drink lots of water  Staying well-hydrated is the best way to reduce your risk of future stones. Drink 8 12-ounce glasses of water daily. Have 2 with each meal and 2 between meals. Try keeping a pitcher of water nearby during the day and at night.  Take medicines if needed  Medicines, including vitamins and minerals, may be prescribed for certain types of stones. You may want to write your doses and medicine times on a calendar. Some medicines decrease stone-forming chemicals in your blood. Others help prevent those chemicals from crystallizing in urine. Still others help keep a normal acid balance in your urine.  Follow your prescribed diet  Your healthcare provider will tell you which foods contain the chemicals you should avoid. Your healthcare provider may also suggest talking to a dietitian. He or she can help you plan meals youll enjoy. These meals wont put you at risk for future stones. You may be told to limit certain foods, depending on which type of stones youve had. You should limit the amount of salt in your food to about 2 grams a day. This will help prevent most types of kidney stones. Make sure you get an adequate amount of calcium in your diet.  For calcium oxalate stones: Limit animal protein, such as meat, eggs, and fish. Limit grapefruit juice and alcohol. Limit high-oxalate foods (such as cola, tea, chocolate, spinach, rhubarb, wheat bran, and peanuts).  For uric acid stones: Limit  high-purine foods, such as mushrooms, peas, beans, anchovies, meat, poultry, shellfish, and organ meats. These foods increase uric acid production.  For cystine stones: Limit high-methionine foods (fish is the most common, but eggs and meats, also). These foods increase production of cystine.  Date Last Reviewed: 2/1/2017  © 5876-3576 [employer]. [address] [place] [address]. All rights reserved. This information is not intended as a substitute for professional medical care. Always follow your healthcare professional's instructions.        Urine Strainer  You may be trying to pass a kidney stone. A urine strainer is used to collect a kidney stone from the urine stream.  Collection method  · Each time you urinate, do so in a jar.  · Pour the urine from the jar through the strainer and into the toilet.  · Save any particles or stones.  · Wash your hands when you are done.  · Continue doing this until 24 hours after your pain stops. By then, if there was a kidney stone, it should have passed out of your bladder. Some stones dissolve into sandlike particles. These pass right through the strainer. In that case, you won't ever see a stone.  Follow-up care  Save any stone that you find in a small container, and bring it to your healthcare provider for analysis. This will help your healthcare provider find out what type of stone was passed. This information may be used to help prevent certain types of stones from forming again.  When to seek medical advice  Contact your healthcare provider right away if symptoms of a kidney stone return, such as:  · Bad pain in your lower abdomen or lower back  · Fever of 100.4°F (38°C) or higher, or as directed by your healthcare provider  · Vomiting  · Bloody or bad-smelling urine  · Increased or worsened pain with urination  Date Last Reviewed: 7/26/2015  © 0201-0778 [employer]. [address] [place] [address]. All rights reserved.  This information is not intended as a substitute for professional medical care. Always follow your healthcare professional's instructions.

## 2020-08-20 LAB — BACTERIA UR CULT: NO GROWTH

## 2020-09-04 ENCOUNTER — OFFICE VISIT (OUTPATIENT)
Dept: UROLOGY | Facility: CLINIC | Age: 45
End: 2020-09-04
Payer: COMMERCIAL

## 2020-09-04 VITALS
HEART RATE: 74 BPM | DIASTOLIC BLOOD PRESSURE: 101 MMHG | BODY MASS INDEX: 39.11 KG/M2 | SYSTOLIC BLOOD PRESSURE: 155 MMHG | TEMPERATURE: 98 F | HEIGHT: 65 IN

## 2020-09-04 DIAGNOSIS — R10.9 ABDOMINAL PAIN, UNSPECIFIED ABDOMINAL LOCATION: ICD-10-CM

## 2020-09-04 DIAGNOSIS — Z76.89 ENCOUNTER TO ESTABLISH CARE: ICD-10-CM

## 2020-09-04 DIAGNOSIS — Z00.00 ENCOUNTER FOR MEDICAL EXAMINATION TO ESTABLISH CARE: ICD-10-CM

## 2020-09-04 DIAGNOSIS — N20.0 NEPHROLITHIASIS: Primary | ICD-10-CM

## 2020-09-04 PROCEDURE — 3008F PR BODY MASS INDEX (BMI) DOCUMENTED: ICD-10-PCS | Mod: CPTII,S$GLB,, | Performed by: STUDENT IN AN ORGANIZED HEALTH CARE EDUCATION/TRAINING PROGRAM

## 2020-09-04 PROCEDURE — 99999 PR PBB SHADOW E&M-EST. PATIENT-LVL IV: ICD-10-PCS | Mod: PBBFAC,,, | Performed by: STUDENT IN AN ORGANIZED HEALTH CARE EDUCATION/TRAINING PROGRAM

## 2020-09-04 PROCEDURE — 3008F BODY MASS INDEX DOCD: CPT | Mod: CPTII,S$GLB,, | Performed by: STUDENT IN AN ORGANIZED HEALTH CARE EDUCATION/TRAINING PROGRAM

## 2020-09-04 PROCEDURE — 99999 PR PBB SHADOW E&M-EST. PATIENT-LVL IV: CPT | Mod: PBBFAC,,, | Performed by: STUDENT IN AN ORGANIZED HEALTH CARE EDUCATION/TRAINING PROGRAM

## 2020-09-04 PROCEDURE — 99204 OFFICE O/P NEW MOD 45 MIN: CPT | Mod: S$GLB,,, | Performed by: STUDENT IN AN ORGANIZED HEALTH CARE EDUCATION/TRAINING PROGRAM

## 2020-09-04 PROCEDURE — 99204 PR OFFICE/OUTPT VISIT, NEW, LEVL IV, 45-59 MIN: ICD-10-PCS | Mod: S$GLB,,, | Performed by: STUDENT IN AN ORGANIZED HEALTH CARE EDUCATION/TRAINING PROGRAM

## 2020-09-04 RX ORDER — TAMSULOSIN HYDROCHLORIDE 0.4 MG/1
0.4 CAPSULE ORAL DAILY
Qty: 30 CAPSULE | Refills: 1 | Status: SHIPPED | OUTPATIENT
Start: 2020-09-04 | End: 2021-08-20

## 2020-09-04 NOTE — PATIENT INSTRUCTIONS
Strategy for stone prevention-  - try to limit salt and red meat intake  - stone formers are encouraged to hydrate enough to produce 2.5 - 3.0 liters of urine output daily  - adding citrate to water in the form of herman, lemon juice, or crystal light has been shown to be preventative of kidney stones  - limit iced tea and maria luisa   - excessive Tums, Rolaids or Vitamin C supplements (above normal daily recommended values) may possibly increase their risk of stone formation

## 2020-09-04 NOTE — PROGRESS NOTES
Subjective:       Patient ID: Omkar Gan is a 45 y.o. female.    Chief Complaint:  Kidney stones  This is a 45 y.o.  female patient that is new to me.  The patient is referred to me by urgent care for kidney stones. About 1-1.5 weeks ago she experienced nausea, left sided abdominal pain. Denies fevers, possibly mild chills. Her first stone episode was 2013, possibly passes about 2 stones every year since 2013. No surgeries for stones. She did see some tiny stone fragments pass after her urgent care visit. No pain or symptoms today.   POCT urinalysis - trace blood      Lab Results   Component Value Date    CREATININE 0.71 08/08/2019     I personally reviewed the images: last CT imaging was from 8/8/2019 over 1 year ago - at the time she was passing 2 calculi in the left proximal-mid ureter in [place]     ---  Past Medical History:   Diagnosis Date    Graves disease     Graves disease     History of kidney stones     Hypertension        Past Surgical History:   Procedure Laterality Date    FACIAL RECONSTRUCTION SURGERY      FACIAL RECONSTRUCTION SURGERY      FACIAL RECONSTRUCTION SURGERY      HYSTERECTOMY         No family history on file.    Social History     Tobacco Use    Smoking status: Former Smoker     Packs/day: 0.50    Smokeless tobacco: Never Used   Substance Use Topics    Alcohol use: Yes     Comment: occasionally    Drug use: No       Current Outpatient Medications on File Prior to Visit   Medication Sig Dispense Refill    albuterol 90 mcg/actuation inhaler Inhale 1-2 puffs into the lungs every 6 (six) hours as needed. Rescue 1 Inhaler 0    DULoxetine (CYMBALTA) 30 MG capsule Take 30 mg by mouth.      fluticasone (FLONASE) 50 mcg/actuation nasal spray 1 spray (50 mcg total) by Each Nare route 2 (two) times daily as needed for Rhinitis. (Patient not taking: Reported on 9/4/2020) 15 g 0    hydroCHLOROthiazide (HYDRODIURIL) 12.5 MG Tab hydrochlorothiazide 12.5 mg tablet    Take 1 tablet every day by oral route.      HYDROcodone-acetaminophen (NORCO) 5-325 mg per tablet Take 1 tablet by mouth every 6 (six) hours as needed for Pain. (Patient not taking: Reported on 9/4/2020) 12 tablet 0    naproxen sodium (ANAPROX) 550 MG tablet Take 1 tablet (550 mg total) by mouth 2 (two) times daily with meals. (Patient not taking: Reported on 9/4/2020) 15 tablet 0    ondansetron (ZOFRAN) 4 MG tablet Take 1 tablet (4 mg total) by mouth every 6 (six) hours as needed for Nausea. (Patient not taking: Reported on 9/4/2020) 12 tablet 0    ondansetron (ZOFRAN-ODT) 4 MG TbDL Take 1 tablet (4 mg total) by mouth every 8 (eight) hours as needed (nausea). (Patient not taking: Reported on 9/4/2020) 15 tablet 0    oxyCODONE-acetaminophen (PERCOCET) 5-325 mg per tablet Take 1 tablet by mouth.      [DISCONTINUED] tamsulosin (FLOMAX) 0.4 mg Cap Take 1 capsule (0.4 mg total) by mouth once daily. (Patient not taking: Reported on 9/4/2020) 30 capsule 0     No current facility-administered medications on file prior to visit.        Review of patient's allergies indicates:   Allergen Reactions    Penicillins Nausea And Vomiting     + dizziness       Review of Systems   Constitutional: Negative for activity change.   HENT: Negative for congestion.    Eyes: Negative for visual disturbance.   Respiratory: Negative for shortness of breath.    Cardiovascular: Negative for chest pain.   Gastrointestinal: Negative for abdominal distention.   Musculoskeletal: Negative for gait problem.   Skin: Negative for color change.   Neurological: Negative for dizziness.   Psychiatric/Behavioral: Negative for agitation.       Objective:      Physical Exam  Constitutional:       Appearance: She is well-developed.   HENT:      Head: Normocephalic and atraumatic.   Neck:      Musculoskeletal: Normal range of motion.   Pulmonary:      Effort: Pulmonary effort is normal.   Musculoskeletal: Normal range of motion.   Skin:     General: Skin  is warm and dry.   Neurological:      Mental Status: She is alert and oriented to person, place, and time.         Assessment:       1. Nephrolithiasis    2. Abdominal pain, unspecified abdominal location        Plan:       1. History of nephrolithiasis, recent abdominal pain. Will obtain a noncontrasted CT abdomen/pelvis to ensure no passing stone in the ureter and evaluate if any stones in the kidneys.   2. Patient wants to sign up for portal.  3. flomax sent to pharmacy. If pt ever feels sx consistent with stone passage - she will hydrate, start flomax, and call office and we can obtain noncontrasted CT.  4. F/u after CT scan to view imaging with me - if no stones passing, will place nephrology referral for metabolic workup.       Nephrolithiasis  -     tamsulosin (FLOMAX) 0.4 mg Cap; Take 1 capsule (0.4 mg total) by mouth once daily.  Dispense: 30 capsule; Refill: 1    Abdominal pain, unspecified abdominal location  -     CT Abdomen Pelvis  Without Contrast; Future; Expected date: 09/04/2020

## 2020-09-04 NOTE — LETTER
September 4, 2020      Lashonda Masterson PA-C  [address]  [place] [address]           [place] [facility]  [address]  [facility] [address]  Phone: [phone]          Patient: Omkar Gan   MR Number: [id number]   YOB: 1975   Date of Visit: 9/4/2020       Dear Lashonda Masterson:    Thank you for referring Omkar Gan to me for evaluation. Attached you will find relevant portions of my assessment and plan of care.    If you have questions, please do not hesitate to call me. I look forward to following Omkar Gan along with you.    Sincerely,    Lynda Gibbons MD    Enclosure  CC:  No Recipients    If you would like to receive this communication electronically, please contact [email or web address] or [phone] to request more information on [employer] Link access.    For providers and/or their staff who would like to refer a patient to [facility], please contact us through our one-stop-shop provider referral line, [facility] , at [phone].    If you feel you have received this communication in error or would no longer like to receive these types of communications, please e-mail [email or web address]

## 2020-09-21 ENCOUNTER — HOSPITAL ENCOUNTER (OUTPATIENT)
Dept: RADIOLOGY | Facility: HOSPITAL | Age: 45
Discharge: HOME | End: 2020-09-21
Attending: STUDENT IN AN ORGANIZED HEALTH CARE EDUCATION/TRAINING PROGRAM
Payer: COMMERCIAL

## 2020-09-21 DIAGNOSIS — R10.9 ABDOMINAL PAIN, UNSPECIFIED ABDOMINAL LOCATION: ICD-10-CM

## 2020-09-21 PROCEDURE — 74176 CT ABD & PELVIS W/O CONTRAST: CPT | Mod: 26,,, | Performed by: RADIOLOGY

## 2020-09-21 PROCEDURE — 74176 CT ABDOMEN PELVIS WITHOUT CONTRAST: ICD-10-PCS | Mod: 26,,, | Performed by: RADIOLOGY

## 2020-09-21 PROCEDURE — 74176 CT ABD & PELVIS W/O CONTRAST: CPT | Mod: TC

## 2020-09-22 ENCOUNTER — PATIENT MESSAGE (OUTPATIENT)
Dept: UROLOGY | Facility: CLINIC | Age: 45
End: 2020-09-22

## 2020-09-22 ENCOUNTER — OFFICE VISIT (OUTPATIENT)
Dept: UROLOGY | Facility: CLINIC | Age: 45
End: 2020-09-22
Payer: COMMERCIAL

## 2020-09-22 DIAGNOSIS — N13.2 HYDRONEPHROSIS WITH URINARY OBSTRUCTION DUE TO URETERAL CALCULUS: Primary | ICD-10-CM

## 2020-09-22 DIAGNOSIS — N20.1 URETERAL CALCULUS: ICD-10-CM

## 2020-09-22 PROCEDURE — 99214 OFFICE O/P EST MOD 30 MIN: CPT | Mod: 95,,, | Performed by: STUDENT IN AN ORGANIZED HEALTH CARE EDUCATION/TRAINING PROGRAM

## 2020-09-22 PROCEDURE — 99214 PR OFFICE/OUTPT VISIT, EST, LEVL IV, 30-39 MIN: ICD-10-PCS | Mod: 95,,, | Performed by: STUDENT IN AN ORGANIZED HEALTH CARE EDUCATION/TRAINING PROGRAM

## 2020-09-22 NOTE — PROGRESS NOTES
Urology Telemedicine Encounter      Subjective:       Patient ID: Omkar Gan is a 45 y.o. female.    Chief Complaint:  followup CT  This is a 45 y.o.  female patient that is an established patient of mine.  The patient is referred to me by urgent care for kidney stones. About 1-1.5 weeks ago she experienced nausea, left sided abdominal pain. Denies fevers, possibly mild chills. Her first stone episode was 2013, possibly passes about 2 stones every year since 2013. No surgeries for stones. She did see some tiny stone fragments pass after her urgent care visit. No pain or symptoms today.   POCT urinalysis - trace blood      We ordered a CT scan to evaluate her left sided pain and nausea    The patient location is: [place]  The chief complaint leading to consultation is: Ct f/u history of kidney stones  Visit type: Virtual visit with synchronous audio and video  Total time spent with patient: 15 minutes  Each patient to whom he or she provides medical services by telemedicine is:  (1) informed of the relationship between the physician and patient and the respective role of any other health care provider with respect to management of the patient; and (2) notified that he or she may decline to receive medical services by telemedicine and may withdraw from such care at any time.    The patient understands that [facility] urology department typically does not utilize telemedicine, we are adapting this technology for the time being to try to avoid patients from being exposed to COVID-19 / coronavirus as per the recommendations of [facility] administration as well as the [facility] Department of Health.   I also recommend that insurance companies consider waiving a copay, if the patient is responsible for one, for telemedicine health services during the current healthcare climate dealing with a COVID-19 outbreak.     Notes: patient still with intermittent left flank pain; she is taking  flomax and hydrating, overall symptoms are well controlled. Underwent f/u noncon CT 9/21/20 which did demonstrate a persistent left (lower) proximal ureteral 1cm stone with hydronephrosis. I showed the pt her CT scan from 8/8/19 and the stone appears to be in a very similar location.       Lab Results   Component Value Date    CREATININE 0.71 08/08/2019        ---  Past Medical History:   Diagnosis Date    Graves disease     Graves disease     History of kidney stones     Hypertension        Past Surgical History:   Procedure Laterality Date    FACIAL RECONSTRUCTION SURGERY      FACIAL RECONSTRUCTION SURGERY      FACIAL RECONSTRUCTION SURGERY      HYSTERECTOMY         History reviewed. No pertinent family history.    Social History     Tobacco Use    Smoking status: Former Smoker     Packs/day: 0.50    Smokeless tobacco: Never Used   Substance Use Topics    Alcohol use: Yes     Comment: occasionally    Drug use: No       Current Outpatient Medications on File Prior to Visit   Medication Sig Dispense Refill    ondansetron (ZOFRAN-ODT) 4 MG TbDL Take 1 tablet (4 mg total) by mouth every 8 (eight) hours as needed (nausea). 15 tablet 0    tamsulosin (FLOMAX) 0.4 mg Cap Take 1 capsule (0.4 mg total) by mouth once daily. 30 capsule 1    albuterol 90 mcg/actuation inhaler Inhale 1-2 puffs into the lungs every 6 (six) hours as needed. Rescue 1 Inhaler 0    DULoxetine (CYMBALTA) 30 MG capsule Take 30 mg by mouth.      fluticasone (FLONASE) 50 mcg/actuation nasal spray 1 spray (50 mcg total) by Each Nare route 2 (two) times daily as needed for Rhinitis. (Patient not taking: Reported on 9/4/2020) 15 g 0    hydroCHLOROthiazide (HYDRODIURIL) 12.5 MG Tab hydrochlorothiazide 12.5 mg tablet   Take 1 tablet every day by oral route.      HYDROcodone-acetaminophen (NORCO) 5-325 mg per tablet Take 1 tablet by mouth every 6 (six) hours as needed for Pain. (Patient not taking: Reported on 9/4/2020) 12 tablet 0     naproxen sodium (ANAPROX) 550 MG tablet Take 1 tablet (550 mg total) by mouth 2 (two) times daily with meals. (Patient not taking: Reported on 9/4/2020) 15 tablet 0    ondansetron (ZOFRAN) 4 MG tablet Take 1 tablet (4 mg total) by mouth every 6 (six) hours as needed for Nausea. (Patient not taking: Reported on 9/4/2020) 12 tablet 0    oxyCODONE-acetaminophen (PERCOCET) 5-325 mg per tablet Take 1 tablet by mouth.       No current facility-administered medications on file prior to visit.        Review of patient's allergies indicates:   Allergen Reactions    Penicillins Nausea And Vomiting     + dizziness       Review of Systems   Constitutional: Negative for activity change.   HENT: Negative for congestion.    Eyes: Negative for visual disturbance.   Respiratory: Negative for shortness of breath.    Cardiovascular: Negative for chest pain.   Gastrointestinal: Negative for abdominal distention.   Musculoskeletal: Negative for gait problem.   Skin: Negative for color change.   Neurological: Negative for dizziness.   Psychiatric/Behavioral: Negative for agitation.       Objective:      Physical Exam  Constitutional:       Appearance: Normal appearance.   HENT:      Head: Normocephalic and atraumatic.   Eyes:      Pupils: Pupils are equal, round, and reactive to light.   Neck:      Musculoskeletal: Normal range of motion.   Pulmonary:      Effort: Pulmonary effort is normal.   Musculoskeletal: Normal range of motion.   Skin:     General: Skin is dry.   Neurological:      General: No focal deficit present.      Mental Status: She is alert and oriented to person, place, and time.   Psychiatric:         Mood and Affect: Mood normal.         Behavior: Behavior normal.         Thought Content: Thought content normal.         Judgment: Judgment normal.           Please note that a full physical examination could not be performed due to the limitations of a telemedicine visit.   Assessment:       1. Hydronephrosis with  urinary obstruction due to ureteral calculus    2. Ureteral calculus        Plan:       1. The patient has had a CT scan recently and when it is compared to her CT scan 13 months ago, the left lower proximal ureteral 1cm stone is in about the same location. Counseled her about the possibility that it is impacted or that there is something anatomically preventing it from passing such as a ureteral stricture.  2. I have explained the indication and benefits of proceeding with a left ureteroscopy, holmium laser lithotripsy, stone basketing, retrograde pyelogram, stent placement possible balloon dilation, possible staged procedure. Alternatives of the procedure were also discussed. The risks included but were not limited to pain, infection (urinary tract infection), bleeding (hematuria), ureteral or urethral stricture,  injury to the urethra, bladder, ureter, need for additional treatments, inability or incomplete removal of kidney stones, pain, and discomfort related to the stent were discussed in depth with the patient.  The patient understands that if I am unable to pass the cameras up to the level of the stone or if visibility is poor, then I will only place a left ureteral stent and pursue a staged procedure.     The ureteral stent was discussed at length. The patient will need to have it removed and the time period in which it should remain indwelling is to be determined after surgery. If left indwelling, the sequelae include pain, infection, lower urinary tract symptoms, development of calcifications on the ureteral stent, worsening kidney function, and complete loss of kidney function.     3. Will have her come in for an in person visit so I can explain further, obtain consents, and send urine for a culture.      Hydronephrosis with urinary obstruction due to ureteral calculus    Ureteral calculus

## 2020-10-05 ENCOUNTER — PATIENT MESSAGE (OUTPATIENT)
Dept: UROLOGY | Facility: CLINIC | Age: 45
End: 2020-10-05

## 2020-10-06 ENCOUNTER — OFFICE VISIT (OUTPATIENT)
Dept: UROLOGY | Facility: CLINIC | Age: 45
End: 2020-10-06
Payer: COMMERCIAL

## 2020-10-06 ENCOUNTER — HOSPITAL ENCOUNTER (EMERGENCY)
Facility: HOSPITAL | Age: 45
Discharge: HOME | End: 2020-10-06
Payer: COMMERCIAL

## 2020-10-06 VITALS
BODY MASS INDEX: 39.53 KG/M2 | WEIGHT: 245.94 LBS | DIASTOLIC BLOOD PRESSURE: 91 MMHG | TEMPERATURE: 99 F | SYSTOLIC BLOOD PRESSURE: 148 MMHG | HEIGHT: 66 IN | HEART RATE: 87 BPM

## 2020-10-06 VITALS
RESPIRATION RATE: 20 BRPM | SYSTOLIC BLOOD PRESSURE: 183 MMHG | BODY MASS INDEX: 39.54 KG/M2 | HEART RATE: 73 BPM | DIASTOLIC BLOOD PRESSURE: 89 MMHG | TEMPERATURE: 99 F | OXYGEN SATURATION: 95 % | WEIGHT: 245 LBS

## 2020-10-06 DIAGNOSIS — N20.0 NEPHROLITHIASIS: ICD-10-CM

## 2020-10-06 DIAGNOSIS — N13.2 HYDRONEPHROSIS WITH URINARY OBSTRUCTION DUE TO URETERAL CALCULUS: Primary | ICD-10-CM

## 2020-10-06 LAB
BILIRUB SERPL-MCNC: ABNORMAL MG/DL
BLOOD URINE, POC: ABNORMAL
CLARITY, POC UA: ABNORMAL
COLOR, POC UA: YELLOW
GLUCOSE UR QL STRIP: ABNORMAL
KETONES UR QL STRIP: ABNORMAL
LEUKOCYTE ESTERASE URINE, POC: ABNORMAL
NITRITE, POC UA: ABNORMAL
PH, POC UA: 5
PROTEIN, POC: ABNORMAL
SPECIFIC GRAVITY, POC UA: 1.02
UROBILINOGEN, POC UA: ABNORMAL

## 2020-10-06 PROCEDURE — 87086 URINE CULTURE/COLONY COUNT: CPT

## 2020-10-06 PROCEDURE — 99214 OFFICE O/P EST MOD 30 MIN: CPT | Mod: 25,S$GLB,, | Performed by: STUDENT IN AN ORGANIZED HEALTH CARE EDUCATION/TRAINING PROGRAM

## 2020-10-06 PROCEDURE — 99999 PR PBB SHADOW E&M-EST. PATIENT-LVL V: ICD-10-PCS | Mod: PBBFAC,,, | Performed by: STUDENT IN AN ORGANIZED HEALTH CARE EDUCATION/TRAINING PROGRAM

## 2020-10-06 PROCEDURE — 99999 PR PBB SHADOW E&M-EST. PATIENT-LVL V: CPT | Mod: PBBFAC,,, | Performed by: STUDENT IN AN ORGANIZED HEALTH CARE EDUCATION/TRAINING PROGRAM

## 2020-10-06 PROCEDURE — 81002 URINALYSIS NONAUTO W/O SCOPE: CPT | Mod: S$GLB,,, | Performed by: STUDENT IN AN ORGANIZED HEALTH CARE EDUCATION/TRAINING PROGRAM

## 2020-10-06 PROCEDURE — 81002 PR URINALYSIS NONAUTO W/O SCOPE: ICD-10-PCS | Mod: S$GLB,,, | Performed by: STUDENT IN AN ORGANIZED HEALTH CARE EDUCATION/TRAINING PROGRAM

## 2020-10-06 PROCEDURE — 99283 EMERGENCY DEPT VISIT LOW MDM: CPT | Mod: 25

## 2020-10-06 PROCEDURE — 81002 URINALYSIS NONAUTO W/O SCOPE: CPT | Mod: PBBFAC,PO | Performed by: STUDENT IN AN ORGANIZED HEALTH CARE EDUCATION/TRAINING PROGRAM

## 2020-10-06 PROCEDURE — 99214 PR OFFICE/OUTPT VISIT, EST, LEVL IV, 30-39 MIN: ICD-10-PCS | Mod: 25,S$GLB,, | Performed by: STUDENT IN AN ORGANIZED HEALTH CARE EDUCATION/TRAINING PROGRAM

## 2020-10-06 RX ORDER — SODIUM CHLORIDE 9 MG/ML
INJECTION, SOLUTION INTRAVENOUS CONTINUOUS
Status: CANCELLED | OUTPATIENT
Start: 2020-10-06

## 2020-10-06 RX ORDER — CIPROFLOXACIN 2 MG/ML
400 INJECTION, SOLUTION INTRAVENOUS
Status: CANCELLED | OUTPATIENT
Start: 2020-10-06

## 2020-10-06 NOTE — FIRST PROVIDER EVALUATION
Emergency Department TeleTriage Encounter Note      CHIEF COMPLAINT    Chief Complaint   Patient presents with    Finger Injury     reports hit right 2nd digit on sunday.  c/ of pain and swelling to right pointer finger. last dose of ibuprofen was 1230 today        VITAL SIGNS   Initial Vitals [10/06/20 1604]   BP Pulse Resp Temp SpO2   (!) 183/89 73 20 98.5 °F (36.9 °C) 95 %      MAP       --            ALLERGIES    Review of patient's allergies indicates:   Allergen Reactions    Penicillins Nausea And Vomiting     + dizziness       PROVIDER TRIAGE NOTE  Patient is a 45 y.o. female  presenting to the emergency department for right 2nd finger pain and swelling.  She states she hit it on a cinder block 3 days ago.  X-ray ordered.      ORDERS  Labs Reviewed - No data to display    ED Orders (720h ago, onward)    Start Ordered     Status Ordering Provider    10/06/20 1625 10/06/20 1624  X-Ray Finger 2 or More Views Right  1 time imaging      Ordered SAÚL HEMPHILL            Virtual Visit Note: The provider triage portion of this emergency department evaluation and documentation was performed via [employer], a HIPAA-compliant telemedicine application, in concert with a tele-presenter in the room. A face to face patient evaluation with one of my colleagues will occur once the patient is placed in an emergency department room.      DISCLAIMER: This note was prepared with [employer] voice recognition transcription software. Garbled syntax, mangled pronouns, and other bizarre constructions may be attributed to that software system.

## 2020-10-06 NOTE — PROGRESS NOTES
Subjective:       Patient ID: Omkar Gan is a 45 y.o. female.    Chief Complaint:  Consents, urine sample  This is a 45 y.o.  female patient that is an established patient of mine. The patient is referred to me by urgent care for kidney stones. About 1-1.5 weeks ago she experienced nausea, left sided abdominal pain. Denies fevers, possibly mild chills. Her first stone episode was 2013, possibly passes about 2 stones every year since 2013. No surgeries for stones. She did see some tiny stone fragments pass after her urgent care visit. No pain or symptoms today.   POCT urinalysis - trace blood      We ordered a CT scan to evaluate her left sided pain and nausea.    patient still with intermittent left flank pain; she is taking flomax and hydrating, overall symptoms are well controlled. Underwent f/u noncon CT 9/21/20 which did demonstrate a persistent left (lower) proximal ureteral 1cm stone with hydronephrosis. I showed the pt her CT scan from 8/8/19 and the stone appears to be in a very similar location.     10/6/20  She returns back to discuss surgery dates, sign consents, and provide urine sample.       Lab Results   Component Value Date    CREATININE 0.71 08/08/2019        ---  Past Medical History:   Diagnosis Date    Allergy     Graves disease     Graves disease     History of kidney stones     Hypertension     Kidney stone        Past Surgical History:   Procedure Laterality Date    FACIAL RECONSTRUCTION SURGERY      FACIAL RECONSTRUCTION SURGERY      FACIAL RECONSTRUCTION SURGERY      HYSTERECTOMY         History reviewed. No pertinent family history.    Social History     Tobacco Use    Smoking status: Former Smoker     Packs/day: 0.50    Smokeless tobacco: Never Used   Substance Use Topics    Alcohol use: Yes     Comment: occasionally    Drug use: No       Current Outpatient Medications on File Prior to Visit   Medication Sig Dispense Refill    ondansetron (ZOFRAN-ODT) 4 MG  TbDL Take 1 tablet (4 mg total) by mouth every 8 (eight) hours as needed (nausea). 15 tablet 0    tamsulosin (FLOMAX) 0.4 mg Cap Take 1 capsule (0.4 mg total) by mouth once daily. 30 capsule 1    albuterol 90 mcg/actuation inhaler Inhale 1-2 puffs into the lungs every 6 (six) hours as needed. Rescue 1 Inhaler 0    DULoxetine (CYMBALTA) 30 MG capsule Take 30 mg by mouth.      fluticasone (FLONASE) 50 mcg/actuation nasal spray 1 spray (50 mcg total) by Each Nare route 2 (two) times daily as needed for Rhinitis. (Patient not taking: Reported on 9/4/2020) 15 g 0    hydroCHLOROthiazide (HYDRODIURIL) 12.5 MG Tab hydrochlorothiazide 12.5 mg tablet   Take 1 tablet every day by oral route.      HYDROcodone-acetaminophen (NORCO) 5-325 mg per tablet Take 1 tablet by mouth every 6 (six) hours as needed for Pain. (Patient not taking: Reported on 9/4/2020) 12 tablet 0    naproxen sodium (ANAPROX) 550 MG tablet Take 1 tablet (550 mg total) by mouth 2 (two) times daily with meals. (Patient not taking: Reported on 9/4/2020) 15 tablet 0    ondansetron (ZOFRAN) 4 MG tablet Take 1 tablet (4 mg total) by mouth every 6 (six) hours as needed for Nausea. (Patient not taking: Reported on 9/4/2020) 12 tablet 0    oxyCODONE-acetaminophen (PERCOCET) 5-325 mg per tablet Take 1 tablet by mouth.       No current facility-administered medications on file prior to visit.        Review of patient's allergies indicates:   Allergen Reactions    Penicillins Nausea And Vomiting     + dizziness       Review of Systems   Constitutional: Negative for activity change.   HENT: Negative for congestion.    Eyes: Negative for visual disturbance.   Respiratory: Negative for shortness of breath.    Cardiovascular: Negative for chest pain.   Gastrointestinal: Negative for abdominal distention.   Musculoskeletal: Negative for gait problem.   Skin: Negative for color change.   Neurological: Negative for dizziness.   Psychiatric/Behavioral: Negative for  agitation.       Objective:      Physical Exam  Constitutional:       Appearance: She is well-developed.   HENT:      Head: Normocephalic and atraumatic.   Neck:      Musculoskeletal: Normal range of motion.   Pulmonary:      Effort: Pulmonary effort is normal.   Musculoskeletal: Normal range of motion.   Skin:     General: Skin is warm and dry.   Neurological:      Mental Status: She is alert and oriented to person, place, and time.         Assessment:       1. Hydronephrosis with urinary obstruction due to ureteral calculus    2. Nephrolithiasis        Plan:       1. I have explained the indication and benefits of proceeding with a left ureteroscopy, holmium laser lithotripsy, stone basketing, retrograde pyelogram, stent placement possible balloon dilation, possible staged procedure and all other indicated procedures with me in the operating room on 10/14/20. Alternatives of the procedure were also discussed. The risks included but were not limited to pain, infection (urinary tract infection), bleeding (hematuria), ureteral or urethral stricture,  injury to the urethra, bladder, ureter, need for additional treatments, inability or incomplete removal of kidney stones, pain, and discomfort related to the stent were discussed in depth with the patient.  The patient understands that if I am unable to pass the cameras up to the level of the stone or if visibility is poor, then I will only place a left ureteral stent and pursue a staged procedure.     The ureteral stent was discussed at length. The patient will need to have it removed and the time period in which it should remain indwelling is to be determined after surgery. If left indwelling, the sequelae include pain, infection, lower urinary tract symptoms, development of calcifications on the ureteral stent, worsening kidney function, and complete loss of kidney function.     The patient voiced understanding and all questions have been answered and informed  consents were signed.  2. Urine for culture.    Hydronephrosis with urinary obstruction due to ureteral calculus  -     POCT URINE DIPSTICK WITHOUT MICROSCOPE  -     Urine culture  -     COVID-19 Routine Screening; Future; Expected date: 10/06/2020  -     Case Request Operating Room: left ureteroscopy, holmium laser lithotripsy, stone basketing, retrograde pyelogram, stent placement possible balloon dilation, possible staged procedure    Nephrolithiasis  -     POCT URINE DIPSTICK WITHOUT MICROSCOPE  -     Urine culture  -     COVID-19 Routine Screening; Future; Expected date: 10/06/2020  -     Case Request Operating Room: left ureteroscopy, holmium laser lithotripsy, stone basketing, retrograde pyelogram, stent placement possible balloon dilation, possible staged procedure    Other orders  -     Progressive Mobility Protocol (mobilize patient to their highest level of functioning at least twice daily); Standing

## 2020-10-07 ENCOUNTER — PATIENT MESSAGE (OUTPATIENT)
Dept: SURGERY | Facility: HOSPITAL | Age: 45
End: 2020-10-07

## 2020-10-08 LAB — BACTERIA UR CULT: NO GROWTH

## 2020-10-12 ENCOUNTER — PATIENT MESSAGE (OUTPATIENT)
Dept: UROLOGY | Facility: CLINIC | Age: 45
End: 2020-10-12

## 2020-11-09 ENCOUNTER — PATIENT MESSAGE (OUTPATIENT)
Dept: UROLOGY | Facility: CLINIC | Age: 45
End: 2020-11-09

## 2021-04-16 ENCOUNTER — PATIENT MESSAGE (OUTPATIENT)
Dept: RESEARCH | Facility: HOSPITAL | Age: 46
End: 2021-04-16

## 2021-06-17 ENCOUNTER — PATIENT OUTREACH (OUTPATIENT)
Dept: ADMINISTRATIVE | Facility: HOSPITAL | Age: 46
End: 2021-06-17

## 2021-08-20 ENCOUNTER — CLINICAL SUPPORT (OUTPATIENT)
Dept: URGENT CARE | Facility: CLINIC | Age: 46
End: 2021-08-20
Payer: COMMERCIAL

## 2021-08-20 ENCOUNTER — LAB VISIT (OUTPATIENT)
Dept: LAB | Facility: HOSPITAL | Age: 46
End: 2021-08-20
Attending: STUDENT IN AN ORGANIZED HEALTH CARE EDUCATION/TRAINING PROGRAM
Payer: COMMERCIAL

## 2021-08-20 ENCOUNTER — OFFICE VISIT (OUTPATIENT)
Dept: FAMILY MEDICINE | Facility: CLINIC | Age: 46
End: 2021-08-20
Payer: COMMERCIAL

## 2021-08-20 VITALS
SYSTOLIC BLOOD PRESSURE: 130 MMHG | HEIGHT: 66 IN | OXYGEN SATURATION: 98 % | HEART RATE: 93 BPM | DIASTOLIC BLOOD PRESSURE: 84 MMHG | WEIGHT: 244.69 LBS | BODY MASS INDEX: 39.32 KG/M2

## 2021-08-20 DIAGNOSIS — Z11.52 ENCOUNTER FOR SCREENING FOR COVID-19: Primary | ICD-10-CM

## 2021-08-20 DIAGNOSIS — Z12.31 ENCOUNTER FOR SCREENING MAMMOGRAM FOR MALIGNANT NEOPLASM OF BREAST: ICD-10-CM

## 2021-08-20 DIAGNOSIS — Z76.89 ESTABLISHING CARE WITH NEW DOCTOR, ENCOUNTER FOR: Primary | ICD-10-CM

## 2021-08-20 DIAGNOSIS — Z00.00 WELL ADULT EXAM: ICD-10-CM

## 2021-08-20 DIAGNOSIS — R03.0 PREHYPERTENSION: ICD-10-CM

## 2021-08-20 LAB
25(OH)D3+25(OH)D2 SERPL-MCNC: 34 NG/ML (ref 30–96)
ALBUMIN SERPL BCP-MCNC: 4 G/DL (ref 3.5–5.2)
ALP SERPL-CCNC: 63 U/L (ref 55–135)
ALT SERPL W/O P-5'-P-CCNC: 32 U/L (ref 10–44)
ANION GAP SERPL CALC-SCNC: 12 MMOL/L (ref 8–16)
AST SERPL-CCNC: 31 U/L (ref 10–40)
BASOPHILS # BLD AUTO: 0.05 K/UL (ref 0–0.2)
BASOPHILS NFR BLD: 0.9 % (ref 0–1.9)
BILIRUB SERPL-MCNC: 0.4 MG/DL (ref 0.1–1)
BUN SERPL-MCNC: 10 MG/DL (ref 6–20)
CALCIUM SERPL-MCNC: 10.2 MG/DL (ref 8.7–10.5)
CHLORIDE SERPL-SCNC: 102 MMOL/L (ref 95–110)
CHOLEST SERPL-MCNC: 187 MG/DL (ref 120–199)
CHOLEST/HDLC SERPL: 2.9 {RATIO} (ref 2–5)
CO2 SERPL-SCNC: 25 MMOL/L (ref 23–29)
CREAT SERPL-MCNC: 0.7 MG/DL (ref 0.5–1.4)
CTP QC/QA: YES
DIFFERENTIAL METHOD: ABNORMAL
EOSINOPHIL # BLD AUTO: 0.2 K/UL (ref 0–0.5)
EOSINOPHIL NFR BLD: 3.2 % (ref 0–8)
ERYTHROCYTE [DISTWIDTH] IN BLOOD BY AUTOMATED COUNT: 13.3 % (ref 11.5–14.5)
EST. GFR  (AFRICAN AMERICAN): >60 ML/MIN/1.73 M^2
EST. GFR  (NON AFRICAN AMERICAN): >60 ML/MIN/1.73 M^2
ESTIMATED AVG GLUCOSE: 114 MG/DL (ref 68–131)
GLUCOSE SERPL-MCNC: 96 MG/DL (ref 70–110)
HBA1C MFR BLD: 5.6 % (ref 4–5.6)
HCT VFR BLD AUTO: 39.3 % (ref 37–48.5)
HDLC SERPL-MCNC: 65 MG/DL (ref 40–75)
HDLC SERPL: 34.8 % (ref 20–50)
HGB BLD-MCNC: 12.5 G/DL (ref 12–16)
IMM GRANULOCYTES # BLD AUTO: 0.01 K/UL (ref 0–0.04)
IMM GRANULOCYTES NFR BLD AUTO: 0.2 % (ref 0–0.5)
LDLC SERPL CALC-MCNC: 102.2 MG/DL (ref 63–159)
LYMPHOCYTES # BLD AUTO: 0.4 K/UL (ref 1–4.8)
LYMPHOCYTES NFR BLD: 6.7 % (ref 18–48)
MCH RBC QN AUTO: 30.3 PG (ref 27–31)
MCHC RBC AUTO-ENTMCNC: 31.8 G/DL (ref 32–36)
MCV RBC AUTO: 95 FL (ref 82–98)
MONOCYTES # BLD AUTO: 0.7 K/UL (ref 0.3–1)
MONOCYTES NFR BLD: 11.7 % (ref 4–15)
NEUTROPHILS # BLD AUTO: 4.4 K/UL (ref 1.8–7.7)
NEUTROPHILS NFR BLD: 77.3 % (ref 38–73)
NONHDLC SERPL-MCNC: 122 MG/DL
NRBC BLD-RTO: 0 /100 WBC
PLATELET # BLD AUTO: 216 K/UL (ref 150–450)
PMV BLD AUTO: 10.8 FL (ref 9.2–12.9)
POTASSIUM SERPL-SCNC: 3.7 MMOL/L (ref 3.5–5.1)
PROT SERPL-MCNC: 7.5 G/DL (ref 6–8.4)
RBC # BLD AUTO: 4.12 M/UL (ref 4–5.4)
SARS-COV-2 RDRP RESP QL NAA+PROBE: POSITIVE
SODIUM SERPL-SCNC: 139 MMOL/L (ref 136–145)
TRIGL SERPL-MCNC: 99 MG/DL (ref 30–150)
TSH SERPL DL<=0.005 MIU/L-ACNC: 1.16 UIU/ML (ref 0.4–4)
WBC # BLD AUTO: 5.71 K/UL (ref 3.9–12.7)

## 2021-08-20 PROCEDURE — 36415 COLL VENOUS BLD VENIPUNCTURE: CPT | Mod: PO | Performed by: STUDENT IN AN ORGANIZED HEALTH CARE EDUCATION/TRAINING PROGRAM

## 2021-08-20 PROCEDURE — 3075F SYST BP GE 130 - 139MM HG: CPT | Mod: CPTII,S$GLB,, | Performed by: STUDENT IN AN ORGANIZED HEALTH CARE EDUCATION/TRAINING PROGRAM

## 2021-08-20 PROCEDURE — 83036 HEMOGLOBIN GLYCOSYLATED A1C: CPT | Performed by: STUDENT IN AN ORGANIZED HEALTH CARE EDUCATION/TRAINING PROGRAM

## 2021-08-20 PROCEDURE — 1160F RVW MEDS BY RX/DR IN RCRD: CPT | Mod: CPTII,S$GLB,, | Performed by: STUDENT IN AN ORGANIZED HEALTH CARE EDUCATION/TRAINING PROGRAM

## 2021-08-20 PROCEDURE — 84443 ASSAY THYROID STIM HORMONE: CPT | Performed by: STUDENT IN AN ORGANIZED HEALTH CARE EDUCATION/TRAINING PROGRAM

## 2021-08-20 PROCEDURE — 3008F BODY MASS INDEX DOCD: CPT | Mod: CPTII,S$GLB,, | Performed by: STUDENT IN AN ORGANIZED HEALTH CARE EDUCATION/TRAINING PROGRAM

## 2021-08-20 PROCEDURE — U0002 COVID-19 LAB TEST NON-CDC: HCPCS | Mod: QW,S$GLB,, | Performed by: NURSE PRACTITIONER

## 2021-08-20 PROCEDURE — 99386 PREV VISIT NEW AGE 40-64: CPT | Mod: S$GLB,,, | Performed by: STUDENT IN AN ORGANIZED HEALTH CARE EDUCATION/TRAINING PROGRAM

## 2021-08-20 PROCEDURE — 99386 PR PREVENTIVE VISIT,NEW,40-64: ICD-10-PCS | Mod: S$GLB,,, | Performed by: STUDENT IN AN ORGANIZED HEALTH CARE EDUCATION/TRAINING PROGRAM

## 2021-08-20 PROCEDURE — 1126F AMNT PAIN NOTED NONE PRSNT: CPT | Mod: CPTII,S$GLB,, | Performed by: STUDENT IN AN ORGANIZED HEALTH CARE EDUCATION/TRAINING PROGRAM

## 2021-08-20 PROCEDURE — 1159F MED LIST DOCD IN RCRD: CPT | Mod: CPTII,S$GLB,, | Performed by: STUDENT IN AN ORGANIZED HEALTH CARE EDUCATION/TRAINING PROGRAM

## 2021-08-20 PROCEDURE — 99999 PR PBB SHADOW E&M-EST. PATIENT-LVL III: ICD-10-PCS | Mod: PBBFAC,,, | Performed by: STUDENT IN AN ORGANIZED HEALTH CARE EDUCATION/TRAINING PROGRAM

## 2021-08-20 PROCEDURE — 1159F PR MEDICATION LIST DOCUMENTED IN MEDICAL RECORD: ICD-10-PCS | Mod: CPTII,S$GLB,, | Performed by: STUDENT IN AN ORGANIZED HEALTH CARE EDUCATION/TRAINING PROGRAM

## 2021-08-20 PROCEDURE — 3075F PR MOST RECENT SYSTOLIC BLOOD PRESS GE 130-139MM HG: ICD-10-PCS | Mod: CPTII,S$GLB,, | Performed by: STUDENT IN AN ORGANIZED HEALTH CARE EDUCATION/TRAINING PROGRAM

## 2021-08-20 PROCEDURE — 80053 COMPREHEN METABOLIC PANEL: CPT | Performed by: STUDENT IN AN ORGANIZED HEALTH CARE EDUCATION/TRAINING PROGRAM

## 2021-08-20 PROCEDURE — 82306 VITAMIN D 25 HYDROXY: CPT | Performed by: STUDENT IN AN ORGANIZED HEALTH CARE EDUCATION/TRAINING PROGRAM

## 2021-08-20 PROCEDURE — 1160F PR REVIEW ALL MEDS BY PRESCRIBER/CLIN PHARMACIST DOCUMENTED: ICD-10-PCS | Mod: CPTII,S$GLB,, | Performed by: STUDENT IN AN ORGANIZED HEALTH CARE EDUCATION/TRAINING PROGRAM

## 2021-08-20 PROCEDURE — 3079F PR MOST RECENT DIASTOLIC BLOOD PRESSURE 80-89 MM HG: ICD-10-PCS | Mod: CPTII,S$GLB,, | Performed by: STUDENT IN AN ORGANIZED HEALTH CARE EDUCATION/TRAINING PROGRAM

## 2021-08-20 PROCEDURE — 85025 COMPLETE CBC W/AUTO DIFF WBC: CPT | Performed by: STUDENT IN AN ORGANIZED HEALTH CARE EDUCATION/TRAINING PROGRAM

## 2021-08-20 PROCEDURE — 99999 PR PBB SHADOW E&M-EST. PATIENT-LVL III: CPT | Mod: PBBFAC,,, | Performed by: STUDENT IN AN ORGANIZED HEALTH CARE EDUCATION/TRAINING PROGRAM

## 2021-08-20 PROCEDURE — U0002: ICD-10-PCS | Mod: QW,S$GLB,, | Performed by: NURSE PRACTITIONER

## 2021-08-20 PROCEDURE — 80061 LIPID PANEL: CPT | Performed by: STUDENT IN AN ORGANIZED HEALTH CARE EDUCATION/TRAINING PROGRAM

## 2021-08-20 PROCEDURE — 1126F PR PAIN SEVERITY QUANTIFIED, NO PAIN PRESENT: ICD-10-PCS | Mod: CPTII,S$GLB,, | Performed by: STUDENT IN AN ORGANIZED HEALTH CARE EDUCATION/TRAINING PROGRAM

## 2021-08-20 PROCEDURE — 3008F PR BODY MASS INDEX (BMI) DOCUMENTED: ICD-10-PCS | Mod: CPTII,S$GLB,, | Performed by: STUDENT IN AN ORGANIZED HEALTH CARE EDUCATION/TRAINING PROGRAM

## 2021-08-20 PROCEDURE — 3079F DIAST BP 80-89 MM HG: CPT | Mod: CPTII,S$GLB,, | Performed by: STUDENT IN AN ORGANIZED HEALTH CARE EDUCATION/TRAINING PROGRAM

## 2021-08-21 ENCOUNTER — TELEPHONE (OUTPATIENT)
Dept: URGENT CARE | Facility: CLINIC | Age: 46
End: 2021-08-21

## 2021-08-25 ENCOUNTER — PATIENT MESSAGE (OUTPATIENT)
Dept: FAMILY MEDICINE | Facility: CLINIC | Age: 46
End: 2021-08-25

## 2021-08-25 DIAGNOSIS — R05.9 COUGH: Primary | ICD-10-CM

## 2021-08-25 RX ORDER — BENZONATATE 100 MG/1
100 CAPSULE ORAL 3 TIMES DAILY PRN
Qty: 30 CAPSULE | Refills: 0 | Status: SHIPPED | OUTPATIENT
Start: 2021-08-25 | End: 2021-09-24

## 2021-10-04 ENCOUNTER — PATIENT MESSAGE (OUTPATIENT)
Dept: ADMINISTRATIVE | Facility: HOSPITAL | Age: 46
End: 2021-10-04

## 2022-01-26 DIAGNOSIS — M25.561 RIGHT KNEE PAIN, UNSPECIFIED CHRONICITY: Primary | ICD-10-CM

## 2022-09-28 ENCOUNTER — HOSPITAL ENCOUNTER (EMERGENCY)
Facility: HOSPITAL | Age: 47
Discharge: HOME | End: 2022-09-28
Attending: EMERGENCY MEDICINE | Admitting: EMERGENCY MEDICINE
Payer: COMMERCIAL

## 2022-09-28 VITALS
DIASTOLIC BLOOD PRESSURE: 80 MMHG | TEMPERATURE: 99 F | HEART RATE: 70 BPM | WEIGHT: 245 LBS | BODY MASS INDEX: 39.37 KG/M2 | OXYGEN SATURATION: 99 % | HEIGHT: 66 IN | RESPIRATION RATE: 18 BRPM | SYSTOLIC BLOOD PRESSURE: 162 MMHG

## 2022-09-28 DIAGNOSIS — M25.512 ACUTE PAIN OF LEFT SHOULDER: Primary | ICD-10-CM

## 2022-09-28 PROCEDURE — 99283 EMERGENCY DEPT VISIT LOW MDM: CPT

## 2022-09-28 PROCEDURE — 25000003 PHARM REV CODE 250

## 2022-09-28 RX ORDER — NAPROXEN 500 MG/1
500 TABLET ORAL 2 TIMES DAILY WITH MEALS
Qty: 30 TABLET | Refills: 0 | Status: SHIPPED | OUTPATIENT
Start: 2022-09-28 | End: 2022-10-13

## 2022-09-28 RX ORDER — NAPROXEN 500 MG/1
500 TABLET ORAL
Status: COMPLETED | OUTPATIENT
Start: 2022-09-28 | End: 2022-09-28

## 2022-09-28 RX ADMIN — NAPROXEN 500 MG: 500 TABLET ORAL at 04:09

## 2022-09-28 NOTE — Clinical Note
"Catrachitodria "Chavonatasha Gan was seen and treated in our emergency department on 9/28/2022.  She may return to work on 09/29/2022.       If you have any questions or concerns, please don't hesitate to call.      Nila Madden PA-C"

## 2022-09-28 NOTE — ED TRIAGE NOTES
Patient arrived to ED with complaints of L shoulder pain radiating down L arm x 2 weeks. Patient reports 2 weeks ago hitting her shoulder on a trailer. Reports she was checking something underneath trailer and went to stand up, stood up too soon and hit shoulder on trailer. Has been taking OTC medication, lido patches, and creams to help with pain. Full ROM to L arm. Able to dress and undress herself.

## 2022-09-28 NOTE — ED PROVIDER NOTES
"Encounter Date: 9/28/2022       History     Chief Complaint   Patient presents with    Shoulder Pain     C/o left sided shoulder pain x 2 weeks, full ROM, tender to touch and with movement, reports "hitting it on the tank of her 18 ol[employer] when she was checking underneath and came up too soon". No obvious deformity noted.      Patient is a 47 year old female who presents to the ED with left shoulder pain onset 2 weeks ago. Patient reports hitting her left shoulder on the tank of her trailer when she was coming up from checking underneath. She complains of pain in her left shoulder that radiates to her neck and down her arm. She denies numbness, tingling, shortness of breath or chest pain. Patient has used lidocaine patches and ibuprofen with no relief.    A ten point review of systems was completed and is negative except as documented above.  Patient denies any other acute medical complaint.    The patients available PMH, PSH, Social History, medications, allergies, and triage vital signs were reviewed just prior to their medical evaluation.      Review of patient's allergies indicates:   Allergen Reactions    Penicillins Nausea And Vomiting     + dizziness     Past Medical History:   Diagnosis Date    Allergy     Graves disease     Graves disease     History of kidney stones     Hypertension     Kidney stone      Past Surgical History:   Procedure Laterality Date    FACIAL RECONSTRUCTION SURGERY      FACIAL RECONSTRUCTION SURGERY      FACIAL RECONSTRUCTION SURGERY      HYSTERECTOMY       Family History   Problem Relation Age of Onset    Hypertension Maternal Grandmother      Social History     Tobacco Use    Smoking status: Never    Smokeless tobacco: Never   Substance Use Topics    Alcohol use: Yes     Comment: occasionally    Drug use: No     Review of Systems   Constitutional:  Negative for fever.   HENT:  Negative for sore throat.    Respiratory:  Negative for shortness of breath.    Cardiovascular:  Negative " for chest pain.   Gastrointestinal:  Negative for nausea.   Genitourinary:  Negative for dysuria.   Musculoskeletal:  Negative for back pain.        Left shoulder pain   Skin:  Negative for rash.   Neurological:  Negative for weakness.   Hematological:  Does not bruise/bleed easily.     Physical Exam     Initial Vitals [09/28/22 1551]   BP Pulse Resp Temp SpO2   (!) 174/97 79 18 98.8 °F (37.1 °C) 98 %      MAP       --         Physical Exam    Nursing note and vitals reviewed.  Constitutional: She appears well-developed and well-nourished. No distress.   HENT:   Head: Normocephalic and atraumatic.   Eyes: Pupils are equal, round, and reactive to light.   Neck: Neck supple.   Cardiovascular:  Normal rate and regular rhythm.           Pulmonary/Chest: Breath sounds normal. No respiratory distress.   Musculoskeletal:      Cervical back: Neck supple.      Comments: Left shoulder non tender to palpation, Full ROM on exam  No deformity, no bruising, no erythema  Radial pulses 2+, sensation intact     Neurological: She is alert and oriented to person, place, and time.   Skin: Skin is warm and dry.   Psychiatric: She has a normal mood and affect. Her behavior is normal.       ED Course   Procedures  Labs Reviewed - No data to display       Imaging Results              X-Ray Shoulder 2 or More Views Left (Final result)  Result time 09/28/22 16:23:54      Final result by Hugo Rendon MD (09/28/22 16:23:54)                   Impression:      As above.      Electronically signed by: Hugo Rendon  Date:    09/28/2022  Time:    16:23               Narrative:    EXAMINATION:  XR SHOULDER COMPLETE 2 OR MORE VIEWS LEFT    CLINICAL HISTORY:  left shoulder pain;    TECHNIQUE:  Two or three views of the left shoulder were performed.    COMPARISON:  None    FINDINGS:  No acute fracture, dislocation, or osseous destruction.  Glenohumeral and acromioclavicular joint spaces appear maintained.  Soft tissues are unremarkable.                                        Medications   naproxen tablet 500 mg (500 mg Oral Given 9/28/22 1612)     Medical Decision Making:   Initial Assessment:   Patient is a 47 year old female who presents to the ED with left shoulder pain onset 2 weeks ago. Pt hit her left shoulder on the tank of her trailer when coming up from underneath. She complains of pain radiating to her neck and down her left arm. No numbness, tingling, SOB, or chest pain. She has taken ibuprofen and used lidocaine patches with no relief.  Differential Diagnosis:   Nerve impingement, musculoskeletal pain, ligament tear/sprain, fracture  Clinical Tests:   Radiological Study: Ordered and Reviewed  ED Management:  Left shoulder pain  -Afebrile, vital signs stable, no apparent distress  -Left shoulder xray: No acute fracture, dislocation, or osseous destruction.  Glenohumeral and acromioclavicular joint spaces appear maintained.  Soft tissues are unremarkable.    Plan:  -Naproxen as prescribed  -Follow up with PCP  -Patient is in stable condition to be discharged home. Return to the ED if experiencing any worsening of symptoms.                 Nila Madden PA-C  Emergency Medicine           Clinical Impression:   Final diagnoses:  [M25.512] Acute pain of left shoulder (Primary)        ED Disposition Condition    Discharge Stable          ED Prescriptions       Medication Sig Dispense Start Date End Date Auth. Provider    naproxen (NAPROSYN) 500 MG tablet Take 1 tablet (500 mg total) by mouth 2 (two) times daily with meals. for 15 days 30 tablet 9/28/2022 10/13/2022 Nila Madden PA-C          Follow-up Information       Follow up With Specialties Details Why Contact Info    Lizzeth Graves MD Family Medicine Schedule an appointment as soon as possible for a visit   [address] [id number] [phone]               Nila Madden PA-C  09/28/22 0801

## 2022-09-28 NOTE — ED NOTES
APPEARANCE: Alert, oriented and in no acute distress.  CARDIAC: Normal rate and rhythm, no murmur heard.   PERIPHERAL VASCULAR: peripheral pulses present. Normal cap refill. No edema. Warm to touch.    RESPIRATORY:Normal rate and effort, breath sounds clear bilaterally throughout chest. Respirations are equal and unlabored no obvious signs of distress.  GASTRO: soft, bowel sounds normal, no tenderness, no abdominal distention.  MUSC: Full ROM. Tenderness  to L shoulder. No obvious deformity.  SKIN: Skin is warm and dry, normal skin turgor, mucous membranes moist.  NEURO: 5/5 strength major flexors/extensors bilaterally. Sensory intact to light touch bilaterally. Dana coma scale: eyes open spontaneously-4, oriented & converses-5, obeys commands-6. No neurological abnormalities.   MENTAL STATUS: awake, alert and aware of environment.  EYE: PERRL, both eyes: pupils brisk and reactive to light. Normal size.  ENT: EARS: no obvious drainage. NOSE: no active bleeding.

## 2022-09-28 NOTE — DISCHARGE INSTRUCTIONS
-F/u with primary care doctor and return to the ER if you are experiencing any worsening of symptoms    Thank you for letting myself and our team take care for you today! It was nice meeting you, and I hope you feel better very soon. Please come back to [facility] for all of your future medical needs.   Our goal in the ER is to always give you outstanding care and exceptional service. You may receive a survey by mail or email in the next week about your experience in our ED. We would greatly appreciate you completing and returning the survey. Your feedback provides us with a way to recognize our staff who give very good care and it helps us learn how to improve when your experience was below our aspiration of excellence.     Sincerely,     Nila Madden PA-C  Emergency Room Physician Assistant  [facility]

## 2022-11-05 ENCOUNTER — HOSPITAL ENCOUNTER (EMERGENCY)
Facility: HOSPITAL | Age: 47
Discharge: HOME | End: 2022-11-05
Attending: EMERGENCY MEDICINE
Payer: COMMERCIAL

## 2022-11-05 VITALS
BODY MASS INDEX: 38.57 KG/M2 | HEART RATE: 70 BPM | TEMPERATURE: 99 F | OXYGEN SATURATION: 99 % | RESPIRATION RATE: 20 BRPM | WEIGHT: 240 LBS | DIASTOLIC BLOOD PRESSURE: 90 MMHG | HEIGHT: 66 IN | SYSTOLIC BLOOD PRESSURE: 158 MMHG

## 2022-11-05 DIAGNOSIS — S09.90XA TRAUMATIC INJURY OF HEAD, INITIAL ENCOUNTER: ICD-10-CM

## 2022-11-05 DIAGNOSIS — S01.81XA LACERATION OF FOREHEAD, INITIAL ENCOUNTER: Primary | ICD-10-CM

## 2022-11-05 PROCEDURE — 99282 EMERGENCY DEPT VISIT SF MDM: CPT

## 2022-11-05 PROCEDURE — 25000003 PHARM REV CODE 250

## 2022-11-05 PROCEDURE — 12011 RPR F/E/E/N/L/M 2.5 CM/<: CPT

## 2022-11-05 RX ORDER — MUPIROCIN 20 MG/G
OINTMENT TOPICAL 2 TIMES DAILY
Qty: 30 G | Refills: 0 | Status: SHIPPED | OUTPATIENT
Start: 2022-11-05 | End: 2022-11-15

## 2022-11-05 RX ORDER — LIDOCAINE HYDROCHLORIDE 10 MG/ML
INJECTION, SOLUTION EPIDURAL; INFILTRATION; INTRACAUDAL; PERINEURAL
Status: COMPLETED
Start: 2022-11-05 | End: 2022-11-05

## 2022-11-05 RX ADMIN — LIDOCAINE HYDROCHLORIDE 100 MG: 10 INJECTION, SOLUTION EPIDURAL; INFILTRATION; INTRACAUDAL; PERINEURAL at 03:11

## 2022-11-05 NOTE — ED PROVIDER NOTES
Encounter Date: 11/5/2022       History     Chief Complaint   Patient presents with    Head Laceration     FOREHEAD LAC, HIT ON DOOR     47-year-old female with no previous medical history, presents to the ER today with complaints of a laceration to her right frontal forehead after accidentally bumping her head while trying to get into her car to get something out of her purse today just prior to arrival.  She states she was not paying attention, accidentally hit her forehead on the corner of the door.  She sustained a 2 cm linear laceration wound to her right forehead above her right eyebrow.  Bleeding controlled with a Band-Aid application prior to arrival.  She states her last tetanus shot was 3 years ago she is up-to-date on her tetanus vaccine.  She denies any headache, nausea vomiting or loss of consciousness during or after this episode.  She states she is not on any blood thinner medications.  No neck pain or other injuries sustained from this event today.    Review of patient's allergies indicates:   Allergen Reactions    Penicillins Nausea And Vomiting     + dizziness     Past Medical History:   Diagnosis Date    Allergy     Graves disease     Graves disease     History of kidney stones     Hypertension     Kidney stone      Past Surgical History:   Procedure Laterality Date    FACIAL RECONSTRUCTION SURGERY      FACIAL RECONSTRUCTION SURGERY      FACIAL RECONSTRUCTION SURGERY      HYSTERECTOMY       Family History   Problem Relation Age of Onset    Hypertension Maternal Grandmother      Social History     Tobacco Use    Smoking status: Never    Smokeless tobacco: Never   Substance Use Topics    Alcohol use: Yes     Comment: occasionally    Drug use: No     Review of Systems   Constitutional:  Negative for diaphoresis, fatigue and fever.   HENT:  Negative for sore throat.    Eyes:  Negative for photophobia and visual disturbance.   Respiratory:  Negative for shortness of breath.    Cardiovascular:  Negative  for chest pain.   Gastrointestinal:  Negative for nausea.   Endocrine: Negative for polydipsia, polyphagia and polyuria.   Genitourinary:  Negative for dysuria.   Musculoskeletal:  Negative for back pain.   Skin:  Positive for wound. Negative for rash.   Allergic/Immunologic: Negative for immunocompromised state.   Neurological:  Negative for weakness.   Hematological:  Does not bruise/bleed easily.   All other systems reviewed and are negative.    Physical Exam     Initial Vitals [11/05/22 1231]   BP Pulse Resp Temp SpO2   (!) 178/98 73 18 98.6 °F (37 °C) 100 %      MAP       --         Physical Exam    Nursing note and vitals reviewed.  Constitutional: She appears well-nourished. She is not diaphoretic. No distress.   HENT:   Head: Normocephalic and atraumatic.   Right Ear: External ear normal.   Left Ear: External ear normal.   Nose: Nose normal.   Mouth/Throat: Oropharynx is clear and moist. No oropharyngeal exudate.   Eyes: Conjunctivae are normal.   Neck: Neck supple.   Normal range of motion.  Cardiovascular:  Normal rate.           No murmur heard.  Pulmonary/Chest: Breath sounds normal. She has no wheezes. She has no rhonchi. She has no rales.   Abdominal: Abdomen is soft. Bowel sounds are normal. There is no abdominal tenderness.   Musculoskeletal:         General: No edema. Normal range of motion.      Cervical back: Normal range of motion and neck supple.     Neurological: She is alert and oriented to person, place, and time. She has normal strength.   Skin: Skin is warm and dry. Capillary refill takes less than 2 seconds. Laceration noted. No rash noted.        Psychiatric: She has a normal mood and affect. Thought content normal.       ED Course   Lac Repair    Date/Time: 11/5/2022 2:59 PM  Performed by: Lizzeth Wilson NP  Authorized by: Christiano Mauricio MD     Consent:     Consent obtained:  Verbal    Consent given by:  Patient    Risks, benefits, and alternatives were discussed: yes      Risks  discussed:  Infection, need for additional repair, poor cosmetic result, tendon damage, vascular damage, poor wound healing, nerve damage, pain and retained foreign body    Alternatives discussed:  No treatment  Universal protocol:     Patient identity confirmed:  Verbally with patient and arm band  Anesthesia:     Anesthesia method:  Local infiltration    Local anesthetic:  Lidocaine 1% w/o epi  Laceration details:     Location:  Face    Face location:  Forehead    Length (cm):  2  Exploration:     Wound extent: no foreign bodies/material noted      Contaminated: no    Treatment:     Area cleansed with:  Povidone-iodine and saline    Amount of cleaning:  Extensive    Irrigation solution:  Sterile saline    Irrigation volume:  500    Irrigation method:  Syringe    Debridement:  None    Undermining:  None    Scar revision: no    Skin repair:     Repair method:  Sutures    Suture size:  5-0    Suture material:  Nylon    Suture technique:  Simple interrupted    Number of sutures:  3  Approximation:     Approximation:  Close  Repair type:     Repair type:  Simple  Post-procedure details:     Dressing:  Adhesive bandage    Procedure completion:  Tolerated well, no immediate complications  Labs Reviewed - No data to display       Imaging Results    None          Medications   LIDOcaine (PF) 10 mg/ml (1%) 10 mg/mL (1 %) injection (100 mg  Given 11/5/22 1505)     Medical Decision Making:   ED Management:  Patient does not have any exam findings or red flag symptoms associated with her head injury/frontal forehead laceration wound injury today did as necessitate further evaluation with head CT imaging at this time.  She appears in no distress.  While in the ER her wound was extensively cleansed, 3 sutures placed, wound care instructions discussed in detail, we will place on prophylactic topical Bactroban and discussed wound care instructions at home.  She will need to follow up with her PCP in 5 days for suture removal wound  reassessment.  ER return precautions discussed in detail as well as head injury precautions, she understands she should return for any new worsening symptoms.  She is happy this plan.          Attending Attestation:     Physician Attestation Statement for NP/PA:       Other NP/PA Attestation Additions:    History of Present Illness: I was not called upon to see this patient but was available for consultation                           Clinical Impression:   Final diagnoses:  [S01.81XA] Laceration of forehead, initial encounter (Primary)  [S09.90XA] Traumatic injury of head, initial encounter        ED Disposition Condition    Discharge Stable          ED Prescriptions       Medication Sig Dispense Start Date End Date Auth. Provider    mupirocin (BACTROBAN) 2 % ointment Apply topically 2 (two) times daily. for 10 days 30 g 11/5/2022 11/15/2022 Lizzeth Wilson NP          Follow-up Information       Follow up With Specialties Details Why Contact Info Additional Information    Mamadou Robertson III, MD Internal Medicine Schedule an appointment as soon as possible for a visit on 11/9/2022 For wound re-check, For suture removal, for ER visit follow up and re-evaluation [address] [facility] [address]  [phone]       [facility] - Emergency Dept Emergency Medicine Go to  As needed, If symptoms worsen [address] [facility] [address] [phone] 1st floor             Lizzeth Wilson NP  11/05/22 1900       Christiano Mauricio MD  11/05/22 6525

## 2022-11-05 NOTE — Clinical Note
"Cendria "Chavonatasha Gan was seen and treated in our emergency department on 11/5/2022.  She may return to work on 11/08/2022.       If you have any questions or concerns, please don't hesitate to call.      Lizzeth Wilson NP"

## 2022-11-09 DIAGNOSIS — Z12.31 OTHER SCREENING MAMMOGRAM: ICD-10-CM

## 2022-11-12 ENCOUNTER — OFFICE VISIT (OUTPATIENT)
Dept: URGENT CARE | Facility: CLINIC | Age: 47
End: 2022-11-12
Payer: COMMERCIAL

## 2022-11-12 ENCOUNTER — HOSPITAL ENCOUNTER (EMERGENCY)
Facility: HOSPITAL | Age: 47
Discharge: HOME | End: 2022-11-12
Attending: EMERGENCY MEDICINE
Payer: COMMERCIAL

## 2022-11-12 ENCOUNTER — NURSE TRIAGE (OUTPATIENT)
Dept: ADMINISTRATIVE | Facility: CLINIC | Age: 47
End: 2022-11-12
Payer: COMMERCIAL

## 2022-11-12 VITALS
RESPIRATION RATE: 18 BRPM | TEMPERATURE: 98 F | BODY MASS INDEX: 38.74 KG/M2 | SYSTOLIC BLOOD PRESSURE: 185 MMHG | OXYGEN SATURATION: 99 % | WEIGHT: 240 LBS | HEART RATE: 80 BPM | DIASTOLIC BLOOD PRESSURE: 101 MMHG

## 2022-11-12 VITALS
HEART RATE: 72 BPM | DIASTOLIC BLOOD PRESSURE: 102 MMHG | HEIGHT: 66 IN | RESPIRATION RATE: 18 BRPM | WEIGHT: 240 LBS | BODY MASS INDEX: 38.57 KG/M2 | OXYGEN SATURATION: 98 % | TEMPERATURE: 98 F | SYSTOLIC BLOOD PRESSURE: 155 MMHG

## 2022-11-12 DIAGNOSIS — S01.81XA LACERATION OF FOREHEAD, INITIAL ENCOUNTER: Primary | ICD-10-CM

## 2022-11-12 DIAGNOSIS — Z48.02 VISIT FOR SUTURE REMOVAL: Primary | ICD-10-CM

## 2022-11-12 DIAGNOSIS — I10 HYPERTENSION, UNSPECIFIED TYPE: ICD-10-CM

## 2022-11-12 PROCEDURE — 3080F DIAST BP >= 90 MM HG: CPT | Mod: CPTII,S$GLB,, | Performed by: FAMILY MEDICINE

## 2022-11-12 PROCEDURE — 3077F PR MOST RECENT SYSTOLIC BLOOD PRESSURE >= 140 MM HG: ICD-10-PCS | Mod: CPTII,S$GLB,, | Performed by: FAMILY MEDICINE

## 2022-11-12 PROCEDURE — 3008F BODY MASS INDEX DOCD: CPT | Mod: CPTII,S$GLB,, | Performed by: FAMILY MEDICINE

## 2022-11-12 PROCEDURE — 99282 EMERGENCY DEPT VISIT SF MDM: CPT

## 2022-11-12 PROCEDURE — 99213 PR OFFICE/OUTPT VISIT, EST, LEVL III, 20-29 MIN: ICD-10-PCS | Mod: S$GLB,,, | Performed by: FAMILY MEDICINE

## 2022-11-12 PROCEDURE — 1159F MED LIST DOCD IN RCRD: CPT | Mod: CPTII,S$GLB,, | Performed by: FAMILY MEDICINE

## 2022-11-12 PROCEDURE — 3080F PR MOST RECENT DIASTOLIC BLOOD PRESSURE >= 90 MM HG: ICD-10-PCS | Mod: CPTII,S$GLB,, | Performed by: FAMILY MEDICINE

## 2022-11-12 PROCEDURE — 99213 OFFICE O/P EST LOW 20 MIN: CPT | Mod: S$GLB,,, | Performed by: FAMILY MEDICINE

## 2022-11-12 PROCEDURE — 3008F PR BODY MASS INDEX (BMI) DOCUMENTED: ICD-10-PCS | Mod: CPTII,S$GLB,, | Performed by: FAMILY MEDICINE

## 2022-11-12 PROCEDURE — 3077F SYST BP >= 140 MM HG: CPT | Mod: CPTII,S$GLB,, | Performed by: FAMILY MEDICINE

## 2022-11-12 PROCEDURE — 1159F PR MEDICATION LIST DOCUMENTED IN MEDICAL RECORD: ICD-10-PCS | Mod: CPTII,S$GLB,, | Performed by: FAMILY MEDICINE

## 2022-11-12 RX ORDER — LISINOPRIL 10 MG/1
10 TABLET ORAL DAILY
Qty: 30 TABLET | Refills: 0 | Status: SHIPPED | OUTPATIENT
Start: 2022-11-12 | End: 2023-09-05

## 2022-11-12 NOTE — PROGRESS NOTES
"Subjective:       Patient ID: Omkar Gan is a 47 y.o. female.    Vitals:  height is 5' 6" (1.676 m) and weight is 108.9 kg (240 lb). Her oral temperature is 98.4 °F (36.9 °C). Her blood pressure is 155/102 (abnormal) and her pulse is 72. Her respiration is 18 and oxygen saturation is 98%.     Chief Complaint: Suture / Staple Removal    Pt here for suture removal above right eye brow    Suture / Staple Removal  The sutures were placed 7 to 10 days ago. She tried antibiotic ointment use since the wound repair. There has been no drainage from the wound. There is no redness present. There is no swelling present. Pain course: sore.     Skin:  Positive for laceration.     Objective:      Physical Exam   Abdominal: Normal appearance.   Neurological: She is alert.   Skin: lesion Jaundice: 1.5 cm laceration above right brow. 3 sutures in place, neatly appositioned, wound appeared moist and not completley healed.  Nursing note and vitals reviewed.      Assessment:       1. Laceration of forehead, initial encounter          Plan:         Laceration of forehead, initial encounter     RTC in 2 days tyrone suture removal. Advised to discontinue use of ointment. Wound care advised.               "

## 2022-11-13 NOTE — ED PROVIDER NOTES
Encounter Date: 11/12/2022    SCRIBE #1 NOTE: I, Kofi Rojo, am scribing for, and in the presence of,  Michael Chan MD. I have scribed the following portions of the note - Other sections scribed: HPI, ROS, PE.     History     Chief Complaint   Patient presents with    Hypertension     Pt went to urgent care earlier to have sutures removed and was told her BP was elevated. Pt checked again at home and it was still high. Pt does not take any medications. Pt has a slight headache and some blurred vision but no dizziness or swelling.      Omkar Gan is a 47 y.o. female with a PMHx of hypertension who presents to the ED for evaluation of hypertension, onset today. Pt visited urgent care today to have stitches removed, at which time her systolic blood pressure was measured at 155. Pt states the urgent care doctor was not able to remove her stitches because she used lotion that morning. Pt's stitches were inserted 1 week ago. Pt measured her blood pressure at home and found that it was significantly higher.    The history is provided by the patient. No  was used.   Review of patient's allergies indicates:   Allergen Reactions    Penicillins Nausea And Vomiting     + dizziness     Past Medical History:   Diagnosis Date    Allergy     Graves disease     Graves disease     History of kidney stones     Hypertension     Kidney stone      Past Surgical History:   Procedure Laterality Date    FACIAL RECONSTRUCTION SURGERY      FACIAL RECONSTRUCTION SURGERY      FACIAL RECONSTRUCTION SURGERY      HYSTERECTOMY       Family History   Problem Relation Age of Onset    Hypertension Maternal Grandmother      Social History     Tobacco Use    Smoking status: Never    Smokeless tobacco: Never   Substance Use Topics    Alcohol use: Yes     Comment: occasionally    Drug use: No     Review of Systems   Constitutional:  Negative for fever.        +Elevated blood pressure.   Cardiovascular:  Negative for chest pain  and leg swelling.   Gastrointestinal:  Negative for abdominal pain.   Skin:  Positive for wound.   Neurological:  Negative for dizziness.     Physical Exam     Initial Vitals [11/12/22 1843]   BP Pulse Resp Temp SpO2   (!) 180/100 83 18 98.2 °F (36.8 °C) 99 %      MAP       --         Physical Exam    Nursing note and vitals reviewed.  Constitutional: She appears well-developed and well-nourished.   HENT:   Head: Normocephalic.   Right Ear: Hearing and tympanic membrane normal.   Left Ear: Hearing and tympanic membrane normal.   Nose: Nose normal.   Mouth/Throat: Oropharynx is clear and moist.   Eyes: Lids are normal. Pupils are equal, round, and reactive to light.   Neck:   Normal range of motion.  Cardiovascular:  Normal rate.           Pulmonary/Chest: Breath sounds normal. No respiratory distress. She has no wheezes. She has no rhonchi.   Abdominal: Abdomen is soft. There is no abdominal tenderness.   Musculoskeletal:         General: Normal range of motion.      Cervical back: Normal range of motion. No rigidity.     Neurological: She is alert and oriented to person, place, and time.   Skin: Skin is warm and dry. No rash noted.   Healing laceration to the right side of the forehead.   Psychiatric: She has a normal mood and affect. Her behavior is normal. Judgment and thought content normal.       ED Course   Suture Removal    Date/Time: 11/12/2022 7:26 PM  Location procedure was performed: [facility] EMERGENCY DEPARTMENT  Performed by: Michael Chan MD  Authorized by: Michael Chan MD   Body area: head/neck  Location details: forehead  Wound Appearance: well healed  Sutures Removed: 3      Labs Reviewed - No data to display       Imaging Results    None          Medications - No data to display  Medical Decision Making:   History:   Old Medical Records: I decided to obtain old medical records.        Scribe Attestation:   Scribe #1: I performed the above scribed service and the documentation accurately  describes the services I performed. I attest to the accuracy of the note.                   Clinical Impression:   Final diagnoses:  [Z48.02] Visit for suture removal (Primary)  [I10] Hypertension, unspecified type      ED Disposition Condition    Discharge Stable          ED Prescriptions       Medication Sig Dispense Start Date End Date Auth. Provider    lisinopriL 10 MG tablet Take 1 tablet (10 mg total) by mouth once daily. 30 tablet 11/12/2022 11/12/2023 Miranda Hart MD          Follow-up Information    None       Physician Attestation for Scribe: I, miranda hart, reviewed documentation as scribed in my presence, which is both accurate and complete.     Miranda Hart MD  11/12/22 1927

## 2022-11-13 NOTE — TELEPHONE ENCOUNTER
Pt reports that she was seen at  today to have stitches removed.Sates while there BP was 155/102. Pt took BP and it was noted to be 175/111. Reports having HA as well. Pt advised to be seen in ED now. Pt verbalized understanding    Reason for Disposition   [1] Systolic BP  >= 160 OR Diastolic >= 100 AND [2] cardiac or neurologic symptoms (e.g., chest pain, difficulty breathing, unsteady gait, blurred vision)    Additional Information   Negative: Difficult to awaken or acting confused (e.g., disoriented, slurred speech)   Negative: SEVERE difficulty breathing (e.g., struggling for each breath, speaks in single words)   Negative: [1] Weakness of the face, arm or leg on one side of the body AND [2] new-onset   Negative: [1] Numbness (i.e., loss of sensation) of the face, arm or leg on one side of the body AND [2] new-onset   Negative: [1] Chest pain lasts > 5 minutes AND [2] history of heart disease (i.e., heart attack, bypass surgery, angina, angioplasty, CHF)   Negative: [1] Chest pain AND [2] took nitrogylcerin AND [3] pain was not relieved   Negative: Sounds like a life-threatening emergency to the triager    Protocols used: Blood Pressure - High-A-AH

## 2022-11-14 ENCOUNTER — PATIENT MESSAGE (OUTPATIENT)
Dept: ADMINISTRATIVE | Facility: HOSPITAL | Age: 47
End: 2022-11-14
Payer: COMMERCIAL

## 2023-04-17 ENCOUNTER — HOSPITAL ENCOUNTER (EMERGENCY)
Facility: HOSPITAL | Age: 48
Discharge: HOME | End: 2023-04-18
Attending: EMERGENCY MEDICINE
Payer: COMMERCIAL

## 2023-04-17 DIAGNOSIS — N23 RENAL COLIC ON LEFT SIDE: Primary | ICD-10-CM

## 2023-04-17 LAB
BILIRUB UR QL STRIP: NEGATIVE
CLARITY UR: ABNORMAL
COLOR UR: YELLOW
GLUCOSE UR QL STRIP: NEGATIVE
HGB UR QL STRIP: NEGATIVE
KETONES UR QL STRIP: NEGATIVE
LEUKOCYTE ESTERASE UR QL STRIP: NEGATIVE
NITRITE UR QL STRIP: NEGATIVE
PH UR STRIP: 7 [PH] (ref 5–8)
PROT UR QL STRIP: NEGATIVE
SP GR UR STRIP: 1.01 (ref 1–1.03)
URN SPEC COLLECT METH UR: ABNORMAL
UROBILINOGEN UR STRIP-ACNC: NEGATIVE EU/DL

## 2023-04-17 PROCEDURE — 81003 URINALYSIS AUTO W/O SCOPE: CPT | Performed by: EMERGENCY MEDICINE

## 2023-04-17 PROCEDURE — 99283 EMERGENCY DEPT VISIT LOW MDM: CPT

## 2023-04-18 VITALS
RESPIRATION RATE: 20 BRPM | BODY MASS INDEX: 38.57 KG/M2 | HEART RATE: 76 BPM | TEMPERATURE: 98 F | DIASTOLIC BLOOD PRESSURE: 69 MMHG | SYSTOLIC BLOOD PRESSURE: 138 MMHG | OXYGEN SATURATION: 98 % | WEIGHT: 240 LBS | HEIGHT: 66 IN

## 2023-04-18 RX ORDER — NAPROXEN 500 MG/1
500 TABLET ORAL 2 TIMES DAILY WITH MEALS
Qty: 20 TABLET | Refills: 0 | Status: SHIPPED | OUTPATIENT
Start: 2023-04-18 | End: 2023-04-28

## 2023-04-18 NOTE — ED PROVIDER NOTES
Encounter Date: 4/17/2023       History     Chief Complaint   Patient presents with    Flank Pain     Left-sided flank, back and side pain x 1 day. Took Azo earlier today and Flomax around 2000. Reports some relief after arriving to ED. Reports hx of kidney stones. Last episode 2 years ago. States symptoms feel same as previous flareups. Denies dysuria.     48-year-old female past medical history kidney stones who presents with complaint of left-sided flank pain.  Patient says that earlier today she would onset of intermittent left-sided flank pain.  She took naproxen prior to coming to the hospital with she says resolved her symptoms.  She denies fever, dysuria.    The history is provided by the patient. No  was used.   Review of patient's allergies indicates:   Allergen Reactions    Penicillins Nausea And Vomiting     + dizziness     Past Medical History:   Diagnosis Date    Allergy     Graves disease     Graves disease     History of kidney stones     Hypertension     Kidney stone      Past Surgical History:   Procedure Laterality Date    FACIAL RECONSTRUCTION SURGERY      FACIAL RECONSTRUCTION SURGERY      FACIAL RECONSTRUCTION SURGERY      HYSTERECTOMY       Family History   Problem Relation Age of Onset    Hypertension Maternal Grandmother      Social History     Tobacco Use    Smoking status: Never    Smokeless tobacco: Never   Substance Use Topics    Alcohol use: Yes     Comment: occasionally    Drug use: No     Review of Systems   Genitourinary:  Positive for flank pain.     Physical Exam     Initial Vitals [04/17/23 2211]   BP Pulse Resp Temp SpO2   (!) 189/111 78 20 97.7 °F (36.5 °C) 99 %      MAP       --         Physical Exam    Nursing note and vitals reviewed.  Constitutional: She appears well-developed. She is not diaphoretic. No distress.   HENT:   Head: Normocephalic and atraumatic.   Eyes: EOM are normal. Pupils are equal, round, and reactive to light.   Neck:   Normal range of  motion.  Cardiovascular:  Normal rate.           Pulmonary/Chest: No respiratory distress.   Abdominal: Abdomen is soft. She exhibits no distension. There is no abdominal tenderness.   No CVA tenderness bilaterally.   Musculoskeletal:         General: Normal range of motion.      Cervical back: Normal range of motion.     Neurological: She is alert and oriented to person, place, and time.   Skin: Skin is warm and dry.   Psychiatric: She has a normal mood and affect.       ED Course   Procedures  Labs Reviewed   URINALYSIS, REFLEX TO URINE CULTURE - Abnormal; Notable for the following components:       Result Value    Appearance, UA Hazy (*)     All other components within normal limits    Narrative:     Specimen Source->Urine          Imaging Results    None          Medications - No data to display  Medical Decision Making:   History:   Old Records Summarized: records from clinic visits.       <> Summary of Records: Sees urology outpatient for hx of kidney stones, reviewed visit from 10/2020.  It appears patient was supposed to have a stent placed October 14, 2020 but this never occurred.  Differential Diagnosis:   UTI, kidney stone, pyelonephritis  Clinical Tests:   Lab Tests: Ordered and Reviewed  ED Management:  48-year-old female with past medical history of kidney stone who previously followed with Urology presents with complaint of left-sided flank pain.  Upon arrival it has resolved after taking naproxen earlier today.  Urine without evidence of infection or blood.  However her presentation is concerning for a possible kidney stone.  Shared decision making with patient regarding further workup given that her urine was unremarkable versus trial of observation.  She prefers to be discharged and will return if her symptoms worsened.  Says that she did not have a stent placed by urology October 2020.  Given a strainer and made referral for Urology.           ED Course as of 04/18/23 0035   Mon Apr 17, 2023    2352 Leukocytes, UA: Negative [AT]   2352 Occult Blood UA: Negative [AT]      ED Course User Index  [AT] Lyubov Fowler MD                 Clinical Impression:   Final diagnoses:  [N23] Renal colic on left side (Primary)        ED Disposition Condition    Discharge Stable          ED Prescriptions       Medication Sig Dispense Start Date End Date Auth. Provider    naproxen (NAPROSYN) 500 MG tablet Take 1 tablet (500 mg total) by mouth 2 (two) times daily with meals. for 10 days 20 tablet 4/18/2023 4/28/2023 Lyubov Fowler MD          Follow-up Information       Follow up With Specialties Details Why Contact Info Additional Information    [facility] Emergency Dept Emergency Medicine  As needed, If symptoms worsen [address]  [facility] [address] [phone]     [facility] Urology Urology Schedule an appointment as soon as possible for a visit in 3 days  [address], [address]  [facility] [address] [phone] Please park in [address] or [address] and use [address]. Take Medical Office Building elevators.             Lyubov Fowler MD  04/18/23 0035

## 2023-04-18 NOTE — DISCHARGE INSTRUCTIONS

## 2023-04-24 ENCOUNTER — PATIENT MESSAGE (OUTPATIENT)
Dept: ADMINISTRATIVE | Facility: HOSPITAL | Age: 48
End: 2023-04-24
Payer: COMMERCIAL

## 2023-04-24 ENCOUNTER — PATIENT OUTREACH (OUTPATIENT)
Dept: ADMINISTRATIVE | Facility: HOSPITAL | Age: 48
End: 2023-04-24
Payer: COMMERCIAL

## 2023-04-24 NOTE — PROGRESS NOTES
2023 Care Everywhere updates requested and reviewed.  Immunizations reconciled. Media reports reviewed.  Duplicate HM overrides and  orders removed.  Overdue HM topic chart audit and/or requested.  Overdue lab testing linked to upcoming lab appointments if applies.  Lab n[facility], and [facility] reviewed     DIS reviewed     Portal outreached regarding Health maintenance     Health Maintenance Due   Topic Date Due    Hepatitis C Screening  Never done    COVID-19 Vaccine (1) Never done    TETANUS VACCINE  Never done    Mammogram  Never done    Colorectal Cancer Screening  Never done    Influenza Vaccine (1) 2022

## 2023-04-29 ENCOUNTER — TELEPHONE (OUTPATIENT)
Dept: INTERNAL MEDICINE | Facility: CLINIC | Age: 48
End: 2023-04-29
Payer: COMMERCIAL

## 2023-04-29 NOTE — TELEPHONE ENCOUNTER
Earlene Mrs. Gan,    Your appointment that was scheduled on 05/08/2023 with Dr. Mamadou Robertson has been canceled due to the provider not being clinic on that day. Your appointment is rescheduled for sometime in July 2023. I do realize that the time change is about a 3 month jump so I did add your appointment to our wait list in case a sooner appointment is available. If you have any questions, please call or message our office. Have a great weekend and see you soon.     Best regards,  Piper FRAGA

## 2023-09-05 ENCOUNTER — OFFICE VISIT (OUTPATIENT)
Dept: FAMILY MEDICINE | Facility: CLINIC | Age: 48
End: 2023-09-05
Payer: COMMERCIAL

## 2023-09-05 VITALS
DIASTOLIC BLOOD PRESSURE: 70 MMHG | OXYGEN SATURATION: 100 % | HEIGHT: 66 IN | BODY MASS INDEX: 40.15 KG/M2 | SYSTOLIC BLOOD PRESSURE: 128 MMHG | WEIGHT: 249.81 LBS | HEART RATE: 77 BPM

## 2023-09-05 DIAGNOSIS — Z00.00 ROUTINE MEDICAL EXAM: Primary | ICD-10-CM

## 2023-09-05 DIAGNOSIS — N62 BREAST HYPERTROPHY IN FEMALE: ICD-10-CM

## 2023-09-05 DIAGNOSIS — Z12.11 SCREENING FOR COLORECTAL CANCER: ICD-10-CM

## 2023-09-05 DIAGNOSIS — E66.01 MORBID OBESITY WITH BODY MASS INDEX (BMI) OF 40.0 TO 44.9 IN ADULT: ICD-10-CM

## 2023-09-05 DIAGNOSIS — Z12.31 BREAST CANCER SCREENING BY MAMMOGRAM: ICD-10-CM

## 2023-09-05 DIAGNOSIS — Z12.12 SCREENING FOR COLORECTAL CANCER: ICD-10-CM

## 2023-09-05 PROCEDURE — 99214 OFFICE O/P EST MOD 30 MIN: CPT | Mod: PBBFAC,PO | Performed by: FAMILY MEDICINE

## 2023-09-05 PROCEDURE — 99396 PR PREVENTIVE VISIT,EST,40-64: ICD-10-PCS | Mod: S$PBB,,, | Performed by: FAMILY MEDICINE

## 2023-09-05 PROCEDURE — 99999 PR PBB SHADOW E&M-EST. PATIENT-LVL IV: ICD-10-PCS | Mod: PBBFAC,,, | Performed by: FAMILY MEDICINE

## 2023-09-05 PROCEDURE — 99396 PREV VISIT EST AGE 40-64: CPT | Mod: S$PBB,,, | Performed by: FAMILY MEDICINE

## 2023-09-05 PROCEDURE — 99999 PR PBB SHADOW E&M-EST. PATIENT-LVL IV: CPT | Mod: PBBFAC,,, | Performed by: FAMILY MEDICINE

## 2023-09-05 NOTE — PROGRESS NOTES
Subjective:       Patient ID: Omkar Gan is a 48 y.o. female.    Chief Complaint: Establish Care and Annual Exam    HPI  47 yo female with morbid obesity presents for an annual physical and to establish care. Pt raised by maternal grandparents.  Pt has large breasts. Notes that she has upper back pain. Wears supportive bras. Wears a 44 DD bra.  Pt is working to lose weight. Last month pt weighed 263 lbs. Has changed her diet and is active at work.  Tetanus in 2017.  Defers flu vaccine.  Review of Systems   Constitutional:  Negative for chills and fever.   HENT: Negative.     Eyes:  Negative for visual disturbance.        Wears glasses. Last eye exam Dec 2022   Respiratory:  Negative for shortness of breath.    Cardiovascular:  Negative for chest pain, palpitations and leg swelling.   Gastrointestinal:  Negative for abdominal pain, anal bleeding, blood in stool, nausea and vomiting.   Endocrine: Negative for polydipsia, polyphagia and polyuria.   Genitourinary:  Negative for dysuria and hematuria.   Allergic/Immunologic: Negative for environmental allergies and food allergies.   Psychiatric/Behavioral:  Negative for dysphoric mood and sleep disturbance. The patient is not nervous/anxious.        Objective:      Physical Exam  Vitals reviewed.   Constitutional:       General: She is not in acute distress.     Appearance: Normal appearance. She is obese. She is not ill-appearing.   HENT:      Head: Normocephalic and atraumatic.      Right Ear: Tympanic membrane, ear canal and external ear normal. There is no impacted cerumen.      Left Ear: Tympanic membrane, ear canal and external ear normal. There is no impacted cerumen.      Nose: Nose normal.      Mouth/Throat:      Mouth: Mucous membranes are moist.      Pharynx: Oropharynx is clear.   Eyes:      General: No scleral icterus.        Right eye: No discharge.         Left eye: No discharge.      Extraocular Movements: Extraocular movements intact.       Conjunctiva/sclera: Conjunctivae normal.   Neck:      Vascular: No carotid bruit.   Cardiovascular:      Rate and Rhythm: Normal rate and regular rhythm.      Pulses: Normal pulses.      Heart sounds: Normal heart sounds. No murmur heard.     No gallop.   Pulmonary:      Effort: Pulmonary effort is normal.      Breath sounds: Normal breath sounds. No wheezing or rales.   Abdominal:      General: Bowel sounds are normal.      Palpations: Abdomen is soft. There is no mass.      Tenderness: There is no abdominal tenderness.   Musculoskeletal:      Cervical back: Normal, normal range of motion and neck supple. No rigidity or tenderness.      Thoracic back: Normal.      Lumbar back: Normal.      Right lower leg: No edema.      Left lower leg: No edema.   Lymphadenopathy:      Cervical: No cervical adenopathy.   Skin:     General: Skin is warm and dry.   Neurological:      Mental Status: She is alert.      Sensory: No sensory deficit.      Motor: No weakness.      Gait: Gait normal.      Deep Tendon Reflexes: Reflexes normal.   Psychiatric:         Mood and Affect: Mood normal.         Assessment:       See plan  Plan:       Routine medical exam  -     Lipid Panel; Future; Expected date: 09/05/2023  -     Comprehensive Metabolic Panel; Future; Expected date: 09/05/2023  -     CBC Auto Differential; Future; Expected date: 09/05/2023  -     TSH; Future; Expected date: 09/05/2023  -     HEPATITIS C ANTIBODY; Future; Expected date: 09/05/2023    Morbid obesity with body mass index (BMI) of 40.0 to 44.9 in adult  The patient's BMI has been recorded in the chart. The patient has been provided educational materials regarding the benefits of attaining and maintaining a normal weight. We will continue to address and follow this issue during follow up visits.     Screening for colorectal cancer  -     Ambulatory referral/consult to Endo Procedure ; Future; Expected date: 09/06/2023    Breast cancer screening by  mammogram  -     Mammo Digital Screening Bilat w/ Dada; Future; Expected date: 09/05/2023    Breast hypertrophy in female  - Supportive bras advised along with weight loss.    F/U in 6 months.

## 2023-09-08 ENCOUNTER — TELEPHONE (OUTPATIENT)
Dept: FAMILY MEDICINE | Facility: CLINIC | Age: 48
End: 2023-09-08
Payer: COMMERCIAL

## 2023-09-11 ENCOUNTER — LAB VISIT (OUTPATIENT)
Dept: LAB | Facility: HOSPITAL | Age: 48
End: 2023-09-11
Attending: FAMILY MEDICINE
Payer: COMMERCIAL

## 2023-09-11 DIAGNOSIS — Z00.00 ROUTINE MEDICAL EXAM: ICD-10-CM

## 2023-09-11 LAB
ALBUMIN SERPL BCP-MCNC: 4.1 G/DL (ref 3.5–5.2)
ALP SERPL-CCNC: 63 U/L (ref 55–135)
ALT SERPL W/O P-5'-P-CCNC: 19 U/L (ref 10–44)
ANION GAP SERPL CALC-SCNC: 9 MMOL/L (ref 8–16)
AST SERPL-CCNC: 23 U/L (ref 10–40)
BASOPHILS # BLD AUTO: 0.04 K/UL (ref 0–0.2)
BASOPHILS NFR BLD: 0.7 % (ref 0–1.9)
BILIRUB SERPL-MCNC: 0.5 MG/DL (ref 0.1–1)
BUN SERPL-MCNC: 11 MG/DL (ref 6–20)
CALCIUM SERPL-MCNC: 9.5 MG/DL (ref 8.7–10.5)
CHLORIDE SERPL-SCNC: 107 MMOL/L (ref 95–110)
CHOLEST SERPL-MCNC: 193 MG/DL (ref 120–199)
CHOLEST/HDLC SERPL: 2.7 {RATIO} (ref 2–5)
CO2 SERPL-SCNC: 26 MMOL/L (ref 23–29)
CREAT SERPL-MCNC: 0.7 MG/DL (ref 0.5–1.4)
DIFFERENTIAL METHOD: NORMAL
EOSINOPHIL # BLD AUTO: 0.2 K/UL (ref 0–0.5)
EOSINOPHIL NFR BLD: 3.4 % (ref 0–8)
ERYTHROCYTE [DISTWIDTH] IN BLOOD BY AUTOMATED COUNT: 14 % (ref 11.5–14.5)
EST. GFR  (NO RACE VARIABLE): >60 ML/MIN/1.73 M^2
GLUCOSE SERPL-MCNC: 102 MG/DL (ref 70–110)
HCT VFR BLD AUTO: 38.6 % (ref 37–48.5)
HCV AB SERPL QL IA: NORMAL
HDLC SERPL-MCNC: 71 MG/DL (ref 40–75)
HDLC SERPL: 36.8 % (ref 20–50)
HGB BLD-MCNC: 12.9 G/DL (ref 12–16)
IMM GRANULOCYTES # BLD AUTO: 0.01 K/UL (ref 0–0.04)
IMM GRANULOCYTES NFR BLD AUTO: 0.2 % (ref 0–0.5)
LDLC SERPL CALC-MCNC: 105.2 MG/DL (ref 63–159)
LYMPHOCYTES # BLD AUTO: 1.7 K/UL (ref 1–4.8)
LYMPHOCYTES NFR BLD: 29 % (ref 18–48)
MCH RBC QN AUTO: 30.4 PG (ref 27–31)
MCHC RBC AUTO-ENTMCNC: 33.4 G/DL (ref 32–36)
MCV RBC AUTO: 91 FL (ref 82–98)
MONOCYTES # BLD AUTO: 0.6 K/UL (ref 0.3–1)
MONOCYTES NFR BLD: 9.4 % (ref 4–15)
NEUTROPHILS # BLD AUTO: 3.4 K/UL (ref 1.8–7.7)
NEUTROPHILS NFR BLD: 57.3 % (ref 38–73)
NONHDLC SERPL-MCNC: 122 MG/DL
NRBC BLD-RTO: 0 /100 WBC
PLATELET # BLD AUTO: 239 K/UL (ref 150–450)
PMV BLD AUTO: 11 FL (ref 9.2–12.9)
POTASSIUM SERPL-SCNC: 4.1 MMOL/L (ref 3.5–5.1)
PROT SERPL-MCNC: 7.6 G/DL (ref 6–8.4)
RBC # BLD AUTO: 4.24 M/UL (ref 4–5.4)
SODIUM SERPL-SCNC: 142 MMOL/L (ref 136–145)
TRIGL SERPL-MCNC: 84 MG/DL (ref 30–150)
TSH SERPL DL<=0.005 MIU/L-ACNC: 2 UIU/ML (ref 0.4–4)
WBC # BLD AUTO: 5.96 K/UL (ref 3.9–12.7)

## 2023-09-11 PROCEDURE — 85025 COMPLETE CBC W/AUTO DIFF WBC: CPT | Performed by: FAMILY MEDICINE

## 2023-09-11 PROCEDURE — 86803 HEPATITIS C AB TEST: CPT | Performed by: FAMILY MEDICINE

## 2023-09-11 PROCEDURE — 36415 COLL VENOUS BLD VENIPUNCTURE: CPT | Mod: PO | Performed by: FAMILY MEDICINE

## 2023-09-11 PROCEDURE — 80061 LIPID PANEL: CPT | Performed by: FAMILY MEDICINE

## 2023-09-11 PROCEDURE — 80053 COMPREHEN METABOLIC PANEL: CPT | Performed by: FAMILY MEDICINE

## 2023-09-11 PROCEDURE — 84443 ASSAY THYROID STIM HORMONE: CPT | Performed by: FAMILY MEDICINE

## 2023-09-13 PROBLEM — R73.03 PREDIABETES: Status: ACTIVE | Noted: 2023-09-13

## 2023-10-12 ENCOUNTER — PATIENT MESSAGE (OUTPATIENT)
Dept: RESEARCH | Facility: CLINIC | Age: 48
End: 2023-10-12
Payer: COMMERCIAL

## 2023-10-24 ENCOUNTER — TELEPHONE (OUTPATIENT)
Dept: ENDOSCOPY | Facility: HOSPITAL | Age: 48
End: 2023-10-24

## 2023-10-24 ENCOUNTER — CLINICAL SUPPORT (OUTPATIENT)
Dept: ENDOSCOPY | Facility: HOSPITAL | Age: 48
End: 2023-10-24
Attending: FAMILY MEDICINE
Payer: COMMERCIAL

## 2023-10-24 VITALS — BODY MASS INDEX: 40.02 KG/M2 | HEIGHT: 66 IN | WEIGHT: 249 LBS

## 2023-10-24 DIAGNOSIS — Z12.12 SCREENING FOR COLORECTAL CANCER: ICD-10-CM

## 2023-10-24 DIAGNOSIS — Z12.11 SCREENING FOR COLORECTAL CANCER: ICD-10-CM

## 2023-10-24 RX ORDER — SODIUM, POTASSIUM,MAG SULFATES 17.5-3.13G
1 SOLUTION, RECONSTITUTED, ORAL ORAL DAILY
Qty: 1 KIT | Refills: 0 | Status: SHIPPED | OUTPATIENT
Start: 2023-10-24 | End: 2023-10-26

## 2023-10-24 NOTE — TELEPHONE ENCOUNTER
Spoke to pt to schedule procedure(s) Colonoscopy       Physician to perform procedure(s) Dr. JULITO Russo  Date of Procedure (s) 2/16/24  Arrival Time 8:30 AM  Time of Procedure(s) 9:40 AM   Location of Procedure(s) [facility] [address] [facility]  Type of Rx Prep sent to patient: Suprep  Instructions provided to patient via [facility]    Patient was informed on the following information and verbalized understanding. Screening questionnaire reviewed with patient and complete. If procedure requires anesthesia, a responsible adult needs to be present to accompany the patient home, patient cannot drive after receiving anesthesia. Appointment details are tentative, especially check-in time. Patient will receive a prep-op call 4 days prior to confirm check-in time for procedure. If applicable the patient should contact their pharmacy to verify Rx for procedure prep is ready for pick-up. Patient was advised to call the scheduling department at [phone] if pharmacy states no Rx is available. Patient was advised to call the endoscopy scheduling department if any questions or concerns arise.      [facility] Scheduling Department

## 2024-02-06 ENCOUNTER — TELEPHONE (OUTPATIENT)
Dept: ENDOSCOPY | Facility: HOSPITAL | Age: 49
End: 2024-02-06
Payer: COMMERCIAL

## 2024-02-06 NOTE — TELEPHONE ENCOUNTER
"Contacted Pt for pre-all for upcoming colonoscopy with Dr. Russo. Pt stated that she would like to cancel the colonoscopy because she "has some things going on with my son". Pt removed from the scoping schedule. Pt verbalized understanding.    "

## 2024-02-07 DIAGNOSIS — E11.9 TYPE 2 DIABETES MELLITUS WITHOUT COMPLICATION: ICD-10-CM

## 2024-02-22 DIAGNOSIS — R73.03 PREDIABETES: ICD-10-CM

## 2024-04-23 ENCOUNTER — PATIENT OUTREACH (OUTPATIENT)
Dept: ADMINISTRATIVE | Facility: HOSPITAL | Age: 49
End: 2024-04-23
Payer: COMMERCIAL

## 2024-04-23 ENCOUNTER — PATIENT MESSAGE (OUTPATIENT)
Dept: ADMINISTRATIVE | Facility: HOSPITAL | Age: 49
End: 2024-04-23
Payer: COMMERCIAL

## 2024-05-21 ENCOUNTER — TELEPHONE (OUTPATIENT)
Dept: FAMILY MEDICINE | Facility: CLINIC | Age: 49
End: 2024-05-21
Payer: COMMERCIAL

## 2024-05-21 NOTE — TELEPHONE ENCOUNTER
Spoke with patient informing her appointment will need to be rescheduled due to provider meeting. Patient rescheduled 6/5.

## 2024-06-09 ENCOUNTER — HOSPITAL ENCOUNTER (INPATIENT)
Facility: HOSPITAL | Age: 49
LOS: 2 days | Discharge: HOME | DRG: 660 | End: 2024-06-12
Attending: INTERNAL MEDICINE | Admitting: INTERNAL MEDICINE
Payer: COMMERCIAL

## 2024-06-09 DIAGNOSIS — R07.9 CHEST PAIN: ICD-10-CM

## 2024-06-09 DIAGNOSIS — N20.0 LEFT NEPHROLITHIASIS: Primary | ICD-10-CM

## 2024-06-09 DIAGNOSIS — N20.1 LEFT URETERAL STONE: ICD-10-CM

## 2024-06-09 DIAGNOSIS — N20.0 NEPHROLITHIASIS: ICD-10-CM

## 2024-06-09 PROBLEM — N13.30 HYDRONEPHROSIS: Status: ACTIVE | Noted: 2024-06-09

## 2024-06-09 LAB
ALBUMIN SERPL BCP-MCNC: 4 G/DL (ref 3.5–5.2)
ALP SERPL-CCNC: 61 U/L (ref 55–135)
ALT SERPL W/O P-5'-P-CCNC: 10 U/L (ref 10–44)
ANION GAP SERPL CALC-SCNC: 11 MMOL/L (ref 8–16)
AST SERPL-CCNC: 15 U/L (ref 10–40)
BACTERIA #/AREA URNS HPF: ABNORMAL /HPF
BASOPHILS # BLD AUTO: 0.05 K/UL (ref 0–0.2)
BASOPHILS NFR BLD: 0.5 % (ref 0–1.9)
BILIRUB SERPL-MCNC: 0.3 MG/DL (ref 0.1–1)
BILIRUB UR QL STRIP: NEGATIVE
BUN SERPL-MCNC: 22 MG/DL (ref 6–20)
CALCIUM SERPL-MCNC: 10 MG/DL (ref 8.7–10.5)
CHLORIDE SERPL-SCNC: 107 MMOL/L (ref 95–110)
CLARITY UR: CLEAR
CO2 SERPL-SCNC: 24 MMOL/L (ref 23–29)
COLOR UR: YELLOW
CREAT SERPL-MCNC: 0.9 MG/DL (ref 0.5–1.4)
DIFFERENTIAL METHOD BLD: NORMAL
EOSINOPHIL # BLD AUTO: 0.3 K/UL (ref 0–0.5)
EOSINOPHIL NFR BLD: 2.7 % (ref 0–8)
ERYTHROCYTE [DISTWIDTH] IN BLOOD BY AUTOMATED COUNT: 13.8 % (ref 11.5–14.5)
EST. GFR  (NO RACE VARIABLE): >60 ML/MIN/1.73 M^2
GLUCOSE SERPL-MCNC: 103 MG/DL (ref 70–110)
GLUCOSE UR QL STRIP: NEGATIVE
HCT VFR BLD AUTO: 37.5 % (ref 37–48.5)
HGB BLD-MCNC: 12.6 G/DL (ref 12–16)
HGB UR QL STRIP: ABNORMAL
IMM GRANULOCYTES # BLD AUTO: 0.02 K/UL (ref 0–0.04)
IMM GRANULOCYTES NFR BLD AUTO: 0.2 % (ref 0–0.5)
KETONES UR QL STRIP: NEGATIVE
LEUKOCYTE ESTERASE UR QL STRIP: ABNORMAL
LYMPHOCYTES # BLD AUTO: 2.2 K/UL (ref 1–4.8)
LYMPHOCYTES NFR BLD: 23.7 % (ref 18–48)
MCH RBC QN AUTO: 31 PG (ref 27–31)
MCHC RBC AUTO-ENTMCNC: 33.6 G/DL (ref 32–36)
MCV RBC AUTO: 92 FL (ref 82–98)
MICROSCOPIC COMMENT: ABNORMAL
MONOCYTES # BLD AUTO: 0.8 K/UL (ref 0.3–1)
MONOCYTES NFR BLD: 8.2 % (ref 4–15)
NEUTROPHILS # BLD AUTO: 6.1 K/UL (ref 1.8–7.7)
NEUTROPHILS NFR BLD: 64.7 % (ref 38–73)
NITRITE UR QL STRIP: NEGATIVE
NRBC BLD-RTO: 0 /100 WBC
PH UR STRIP: 6 [PH] (ref 5–8)
PLATELET # BLD AUTO: 239 K/UL (ref 150–450)
PMV BLD AUTO: 9.9 FL (ref 9.2–12.9)
POTASSIUM SERPL-SCNC: 3.5 MMOL/L (ref 3.5–5.1)
PROT SERPL-MCNC: 7.4 G/DL (ref 6–8.4)
PROT UR QL STRIP: ABNORMAL
RBC # BLD AUTO: 4.07 M/UL (ref 4–5.4)
RBC #/AREA URNS HPF: 4 /HPF (ref 0–4)
SODIUM SERPL-SCNC: 142 MMOL/L (ref 136–145)
SP GR UR STRIP: 1.02 (ref 1–1.03)
SQUAMOUS #/AREA URNS HPF: 0 /HPF
URN SPEC COLLECT METH UR: ABNORMAL
UROBILINOGEN UR STRIP-ACNC: NEGATIVE EU/DL
WBC # BLD AUTO: 9.36 K/UL (ref 3.9–12.7)
WBC #/AREA URNS HPF: 31 /HPF (ref 0–5)

## 2024-06-09 PROCEDURE — 96375 TX/PRO/DX INJ NEW DRUG ADDON: CPT

## 2024-06-09 PROCEDURE — 96365 THER/PROPH/DIAG IV INF INIT: CPT

## 2024-06-09 PROCEDURE — 63600175 PHARM REV CODE 636 W HCPCS

## 2024-06-09 PROCEDURE — G0378 HOSPITAL OBSERVATION PER HR: HCPCS

## 2024-06-09 PROCEDURE — 85025 COMPLETE CBC W/AUTO DIFF WBC: CPT

## 2024-06-09 PROCEDURE — 99285 EMERGENCY DEPT VISIT HI MDM: CPT | Mod: 25

## 2024-06-09 PROCEDURE — 81000 URINALYSIS NONAUTO W/SCOPE: CPT

## 2024-06-09 PROCEDURE — 25000003 PHARM REV CODE 250

## 2024-06-09 PROCEDURE — 96361 HYDRATE IV INFUSION ADD-ON: CPT

## 2024-06-09 PROCEDURE — 80053 COMPREHEN METABOLIC PANEL: CPT

## 2024-06-09 PROCEDURE — 87086 URINE CULTURE/COLONY COUNT: CPT

## 2024-06-09 RX ORDER — ONDANSETRON 4 MG/1
4 TABLET, ORALLY DISINTEGRATING ORAL
Status: COMPLETED | OUTPATIENT
Start: 2024-06-09 | End: 2024-06-09

## 2024-06-09 RX ORDER — MORPHINE SULFATE 4 MG/ML
4 INJECTION, SOLUTION INTRAMUSCULAR; INTRAVENOUS EVERY 4 HOURS PRN
Status: DISCONTINUED | OUTPATIENT
Start: 2024-06-10 | End: 2024-06-12 | Stop reason: HOSPADM

## 2024-06-09 RX ORDER — SODIUM CHLORIDE 9 MG/ML
INJECTION, SOLUTION INTRAVENOUS CONTINUOUS
Status: DISCONTINUED | OUTPATIENT
Start: 2024-06-10 | End: 2024-06-10

## 2024-06-09 RX ORDER — SODIUM CHLORIDE 0.9 % (FLUSH) 0.9 %
10 SYRINGE (ML) INJECTION EVERY 12 HOURS PRN
Status: DISCONTINUED | OUTPATIENT
Start: 2024-06-10 | End: 2024-06-12 | Stop reason: HOSPADM

## 2024-06-09 RX ORDER — HYDROCODONE BITARTRATE AND ACETAMINOPHEN 5; 325 MG/1; MG/1
1 TABLET ORAL EVERY 6 HOURS PRN
Status: DISCONTINUED | OUTPATIENT
Start: 2024-06-10 | End: 2024-06-12 | Stop reason: HOSPADM

## 2024-06-09 RX ORDER — HEPARIN SODIUM 5000 [USP'U]/ML
5000 INJECTION, SOLUTION INTRAVENOUS; SUBCUTANEOUS EVERY 8 HOURS
Status: DISCONTINUED | OUTPATIENT
Start: 2024-06-10 | End: 2024-06-12 | Stop reason: HOSPADM

## 2024-06-09 RX ORDER — KETOROLAC TROMETHAMINE 30 MG/ML
15 INJECTION, SOLUTION INTRAMUSCULAR; INTRAVENOUS
Status: COMPLETED | OUTPATIENT
Start: 2024-06-09 | End: 2024-06-09

## 2024-06-09 RX ORDER — IBUPROFEN 200 MG
24 TABLET ORAL
Status: DISCONTINUED | OUTPATIENT
Start: 2024-06-10 | End: 2024-06-12 | Stop reason: HOSPADM

## 2024-06-09 RX ORDER — ACETAMINOPHEN 325 MG/1
650 TABLET ORAL EVERY 8 HOURS PRN
Status: DISCONTINUED | OUTPATIENT
Start: 2024-06-10 | End: 2024-06-12 | Stop reason: HOSPADM

## 2024-06-09 RX ORDER — ACETAMINOPHEN 325 MG/1
650 TABLET ORAL EVERY 4 HOURS PRN
Status: DISCONTINUED | OUTPATIENT
Start: 2024-06-10 | End: 2024-06-12 | Stop reason: HOSPADM

## 2024-06-09 RX ORDER — GLUCAGON 1 MG
1 KIT INJECTION
Status: DISCONTINUED | OUTPATIENT
Start: 2024-06-10 | End: 2024-06-12 | Stop reason: HOSPADM

## 2024-06-09 RX ORDER — IBUPROFEN 200 MG
16 TABLET ORAL
Status: DISCONTINUED | OUTPATIENT
Start: 2024-06-10 | End: 2024-06-12 | Stop reason: HOSPADM

## 2024-06-09 RX ORDER — ONDANSETRON HYDROCHLORIDE 2 MG/ML
4 INJECTION, SOLUTION INTRAVENOUS EVERY 8 HOURS PRN
Status: DISCONTINUED | OUTPATIENT
Start: 2024-06-10 | End: 2024-06-12 | Stop reason: HOSPADM

## 2024-06-09 RX ORDER — NALOXONE HCL 0.4 MG/ML
0.02 VIAL (ML) INJECTION
Status: DISCONTINUED | OUTPATIENT
Start: 2024-06-10 | End: 2024-06-12 | Stop reason: HOSPADM

## 2024-06-09 RX ORDER — TALC
6 POWDER (GRAM) TOPICAL NIGHTLY PRN
Status: DISCONTINUED | OUTPATIENT
Start: 2024-06-10 | End: 2024-06-12 | Stop reason: HOSPADM

## 2024-06-09 RX ADMIN — MORPHINE SULFATE 4 MG: 4 INJECTION INTRAVENOUS at 11:06

## 2024-06-09 RX ADMIN — CEFTRIAXONE SODIUM 1 G: 1 INJECTION, POWDER, FOR SOLUTION INTRAMUSCULAR; INTRAVENOUS at 10:06

## 2024-06-09 RX ADMIN — SODIUM CHLORIDE: 9 INJECTION, SOLUTION INTRAVENOUS at 11:06

## 2024-06-09 RX ADMIN — ONDANSETRON 4 MG: 4 TABLET, ORALLY DISINTEGRATING ORAL at 10:06

## 2024-06-09 RX ADMIN — KETOROLAC TROMETHAMINE 15 MG: 30 INJECTION, SOLUTION INTRAMUSCULAR; INTRAVENOUS at 08:06

## 2024-06-09 NOTE — LETTER
[facility]  [address]  [place]  [address]  Phone: [phone]  Fax: [phone] June 12, 2024     Patient: Omkar Gan   YOB: 1975       To Whom It May Concern:    Omkar Gan was admitted to the hospital on 6/9/2024  7:30 PM and discharged on .  She may return to work on 06/17/2024 .  If you have any questions or concerns, please don't hesitate to call my office at [phone].      Sincerely,        Mckay Moran PA-C  Department of Hospital Medicine

## 2024-06-09 NOTE — Clinical Note
Diagnosis: Left nephrolithiasis [[id number]]   Future Attending Provider: SAMM BAUMAN [[id number]]   Is the patient being sent to ED Observation?: No   Special Needs:: No Special Needs [1]

## 2024-06-10 ENCOUNTER — ANESTHESIA (OUTPATIENT)
Dept: SURGERY | Facility: HOSPITAL | Age: 49
DRG: 660 | End: 2024-06-10
Payer: COMMERCIAL

## 2024-06-10 ENCOUNTER — ANESTHESIA EVENT (OUTPATIENT)
Dept: SURGERY | Facility: HOSPITAL | Age: 49
DRG: 660 | End: 2024-06-10
Payer: COMMERCIAL

## 2024-06-10 DIAGNOSIS — N20.1 LEFT URETERAL STONE: Primary | ICD-10-CM

## 2024-06-10 LAB
ANION GAP SERPL CALC-SCNC: 9 MMOL/L (ref 8–16)
BASOPHILS # BLD AUTO: 0.04 K/UL (ref 0–0.2)
BASOPHILS NFR BLD: 0.6 % (ref 0–1.9)
BUN SERPL-MCNC: 16 MG/DL (ref 6–20)
CALCIUM SERPL-MCNC: 9.2 MG/DL (ref 8.7–10.5)
CHLORIDE SERPL-SCNC: 107 MMOL/L (ref 95–110)
CO2 SERPL-SCNC: 25 MMOL/L (ref 23–29)
CREAT SERPL-MCNC: 0.8 MG/DL (ref 0.5–1.4)
DIFFERENTIAL METHOD BLD: ABNORMAL
EOSINOPHIL # BLD AUTO: 0.2 K/UL (ref 0–0.5)
EOSINOPHIL NFR BLD: 3.4 % (ref 0–8)
ERYTHROCYTE [DISTWIDTH] IN BLOOD BY AUTOMATED COUNT: 13.8 % (ref 11.5–14.5)
EST. GFR  (NO RACE VARIABLE): >60 ML/MIN/1.73 M^2
GLUCOSE SERPL-MCNC: 84 MG/DL (ref 70–110)
HCT VFR BLD AUTO: 36.7 % (ref 37–48.5)
HGB BLD-MCNC: 12 G/DL (ref 12–16)
IMM GRANULOCYTES # BLD AUTO: 0.01 K/UL (ref 0–0.04)
IMM GRANULOCYTES NFR BLD AUTO: 0.1 % (ref 0–0.5)
LYMPHOCYTES # BLD AUTO: 2.2 K/UL (ref 1–4.8)
LYMPHOCYTES NFR BLD: 32.5 % (ref 18–48)
MCH RBC QN AUTO: 30.5 PG (ref 27–31)
MCHC RBC AUTO-ENTMCNC: 32.7 G/DL (ref 32–36)
MCV RBC AUTO: 93 FL (ref 82–98)
MONOCYTES # BLD AUTO: 0.6 K/UL (ref 0.3–1)
MONOCYTES NFR BLD: 8.7 % (ref 4–15)
NEUTROPHILS # BLD AUTO: 3.7 K/UL (ref 1.8–7.7)
NEUTROPHILS NFR BLD: 54.7 % (ref 38–73)
NRBC BLD-RTO: 0 /100 WBC
PLATELET # BLD AUTO: 211 K/UL (ref 150–450)
PMV BLD AUTO: 10.7 FL (ref 9.2–12.9)
POTASSIUM SERPL-SCNC: 3.7 MMOL/L (ref 3.5–5.1)
RBC # BLD AUTO: 3.93 M/UL (ref 4–5.4)
SODIUM SERPL-SCNC: 141 MMOL/L (ref 136–145)
WBC # BLD AUTO: 6.79 K/UL (ref 3.9–12.7)

## 2024-06-10 PROCEDURE — 52356 CYSTO/URETERO W/LITHOTRIPSY: CPT | Mod: LT,,, | Performed by: UROLOGY

## 2024-06-10 PROCEDURE — 99222 1ST HOSP IP/OBS MODERATE 55: CPT | Mod: 25,,, | Performed by: UROLOGY

## 2024-06-10 PROCEDURE — 27201423 OPTIME MED/SURG SUP & DEVICES STERILE SUPPLY: Performed by: UROLOGY

## 2024-06-10 PROCEDURE — 36415 COLL VENOUS BLD VENIPUNCTURE: CPT | Performed by: STUDENT IN AN ORGANIZED HEALTH CARE EDUCATION/TRAINING PROGRAM

## 2024-06-10 PROCEDURE — 37000009 HC ANESTHESIA EA ADD 15 MINS: Performed by: UROLOGY

## 2024-06-10 PROCEDURE — 36000707: Performed by: UROLOGY

## 2024-06-10 PROCEDURE — C1758 CATHETER, URETERAL: HCPCS | Performed by: UROLOGY

## 2024-06-10 PROCEDURE — 96372 THER/PROPH/DIAG INJ SC/IM: CPT | Performed by: STUDENT IN AN ORGANIZED HEALTH CARE EDUCATION/TRAINING PROGRAM

## 2024-06-10 PROCEDURE — 0TC78ZZ EXTIRPATION OF MATTER FROM LEFT URETER, VIA NATURAL OR ARTIFICIAL OPENING ENDOSCOPIC: ICD-10-PCS | Performed by: UROLOGY

## 2024-06-10 PROCEDURE — 25000003 PHARM REV CODE 250: Performed by: UROLOGY

## 2024-06-10 PROCEDURE — 63600175 PHARM REV CODE 636 W HCPCS: Performed by: STUDENT IN AN ORGANIZED HEALTH CARE EDUCATION/TRAINING PROGRAM

## 2024-06-10 PROCEDURE — 80048 BASIC METABOLIC PNL TOTAL CA: CPT | Performed by: STUDENT IN AN ORGANIZED HEALTH CARE EDUCATION/TRAINING PROGRAM

## 2024-06-10 PROCEDURE — BT1FYZZ FLUOROSCOPY OF LEFT KIDNEY, URETER AND BLADDER USING OTHER CONTRAST: ICD-10-PCS | Performed by: UROLOGY

## 2024-06-10 PROCEDURE — 37000008 HC ANESTHESIA 1ST 15 MINUTES: Performed by: UROLOGY

## 2024-06-10 PROCEDURE — 63600175 PHARM REV CODE 636 W HCPCS

## 2024-06-10 PROCEDURE — 96376 TX/PRO/DX INJ SAME DRUG ADON: CPT

## 2024-06-10 PROCEDURE — C1769 GUIDE WIRE: HCPCS | Performed by: UROLOGY

## 2024-06-10 PROCEDURE — 63600175 PHARM REV CODE 636 W HCPCS: Performed by: ANESTHESIOLOGY

## 2024-06-10 PROCEDURE — D9220A PRA ANESTHESIA: Mod: ANES,,, | Performed by: ANESTHESIOLOGY

## 2024-06-10 PROCEDURE — 25000003 PHARM REV CODE 250: Performed by: STUDENT IN AN ORGANIZED HEALTH CARE EDUCATION/TRAINING PROGRAM

## 2024-06-10 PROCEDURE — 0T778DZ DILATION OF LEFT URETER WITH INTRALUMINAL DEVICE, VIA NATURAL OR ARTIFICIAL OPENING ENDOSCOPIC: ICD-10-PCS | Performed by: UROLOGY

## 2024-06-10 PROCEDURE — 96375 TX/PRO/DX INJ NEW DRUG ADDON: CPT

## 2024-06-10 PROCEDURE — 25500020 PHARM REV CODE 255: Performed by: UROLOGY

## 2024-06-10 PROCEDURE — D9220A PRA ANESTHESIA: Mod: CRNA,,, | Performed by: NURSE ANESTHETIST, CERTIFIED REGISTERED

## 2024-06-10 PROCEDURE — 36000706: Performed by: UROLOGY

## 2024-06-10 PROCEDURE — 63600175 PHARM REV CODE 636 W HCPCS: Performed by: NURSE ANESTHETIST, CERTIFIED REGISTERED

## 2024-06-10 PROCEDURE — 85025 COMPLETE CBC W/AUTO DIFF WBC: CPT | Performed by: STUDENT IN AN ORGANIZED HEALTH CARE EDUCATION/TRAINING PROGRAM

## 2024-06-10 PROCEDURE — 74420 UROGRAPHY RTRGR +-KUB: CPT | Mod: 26,,, | Performed by: UROLOGY

## 2024-06-10 PROCEDURE — 96361 HYDRATE IV INFUSION ADD-ON: CPT

## 2024-06-10 PROCEDURE — 11000001 HC ACUTE MED/SURG PRIVATE ROOM

## 2024-06-10 PROCEDURE — 25000003 PHARM REV CODE 250: Performed by: NURSE ANESTHETIST, CERTIFIED REGISTERED

## 2024-06-10 PROCEDURE — C2617 STENT, NON-COR, TEM W/O DEL: HCPCS | Performed by: UROLOGY

## 2024-06-10 PROCEDURE — 71000033 HC RECOVERY, INTIAL HOUR: Performed by: UROLOGY

## 2024-06-10 DEVICE — STENT URETERAL UNIV 6FR 26CM: Type: IMPLANTABLE DEVICE | Site: URETER | Status: FUNCTIONAL

## 2024-06-10 RX ORDER — HYDROMORPHONE HYDROCHLORIDE 2 MG/ML
0.5 INJECTION, SOLUTION INTRAMUSCULAR; INTRAVENOUS; SUBCUTANEOUS EVERY 5 MIN PRN
Status: DISCONTINUED | OUTPATIENT
Start: 2024-06-10 | End: 2024-06-12 | Stop reason: HOSPADM

## 2024-06-10 RX ORDER — ONDANSETRON HYDROCHLORIDE 2 MG/ML
INJECTION, SOLUTION INTRAVENOUS
Status: DISCONTINUED | OUTPATIENT
Start: 2024-06-10 | End: 2024-06-10

## 2024-06-10 RX ORDER — MORPHINE SULFATE 4 MG/ML
4 INJECTION, SOLUTION INTRAMUSCULAR; INTRAVENOUS ONCE
Status: COMPLETED | OUTPATIENT
Start: 2024-06-10 | End: 2024-06-10

## 2024-06-10 RX ORDER — MIDAZOLAM HYDROCHLORIDE 1 MG/ML
INJECTION INTRAMUSCULAR; INTRAVENOUS
Status: DISCONTINUED | OUTPATIENT
Start: 2024-06-10 | End: 2024-06-10

## 2024-06-10 RX ORDER — FENTANYL CITRATE 50 UG/ML
INJECTION, SOLUTION INTRAMUSCULAR; INTRAVENOUS
Status: DISCONTINUED | OUTPATIENT
Start: 2024-06-10 | End: 2024-06-10

## 2024-06-10 RX ORDER — SODIUM CHLORIDE 9 MG/ML
INJECTION, SOLUTION INTRAVENOUS CONTINUOUS
Status: DISCONTINUED | OUTPATIENT
Start: 2024-06-10 | End: 2024-06-10

## 2024-06-10 RX ORDER — OXYBUTYNIN CHLORIDE 5 MG/1
5 TABLET ORAL 3 TIMES DAILY PRN
Status: DISCONTINUED | OUTPATIENT
Start: 2024-06-10 | End: 2024-06-12 | Stop reason: HOSPADM

## 2024-06-10 RX ORDER — PHENAZOPYRIDINE HYDROCHLORIDE 100 MG/1
200 TABLET, FILM COATED ORAL 3 TIMES DAILY PRN
Status: DISCONTINUED | OUTPATIENT
Start: 2024-06-10 | End: 2024-06-12 | Stop reason: HOSPADM

## 2024-06-10 RX ORDER — PHENYLEPHRINE HYDROCHLORIDE 10 MG/ML
INJECTION INTRAVENOUS
Status: DISCONTINUED | OUTPATIENT
Start: 2024-06-10 | End: 2024-06-10

## 2024-06-10 RX ORDER — PROPOFOL 10 MG/ML
VIAL (ML) INTRAVENOUS CONTINUOUS PRN
Status: DISCONTINUED | OUTPATIENT
Start: 2024-06-10 | End: 2024-06-10

## 2024-06-10 RX ORDER — TAMSULOSIN HYDROCHLORIDE 0.4 MG/1
0.4 CAPSULE ORAL DAILY
Status: DISCONTINUED | OUTPATIENT
Start: 2024-06-10 | End: 2024-06-12 | Stop reason: HOSPADM

## 2024-06-10 RX ORDER — LIDOCAINE HYDROCHLORIDE 20 MG/ML
INJECTION INTRAVENOUS
Status: DISCONTINUED | OUTPATIENT
Start: 2024-06-10 | End: 2024-06-10

## 2024-06-10 RX ORDER — SODIUM CHLORIDE 0.9 % (FLUSH) 0.9 %
10 SYRINGE (ML) INJECTION
Status: DISCONTINUED | OUTPATIENT
Start: 2024-06-10 | End: 2024-06-12 | Stop reason: HOSPADM

## 2024-06-10 RX ORDER — PROPOFOL 10 MG/ML
VIAL (ML) INTRAVENOUS
Status: DISCONTINUED | OUTPATIENT
Start: 2024-06-10 | End: 2024-06-10

## 2024-06-10 RX ORDER — DEXAMETHASONE SODIUM PHOSPHATE 4 MG/ML
INJECTION, SOLUTION INTRA-ARTICULAR; INTRALESIONAL; INTRAMUSCULAR; INTRAVENOUS; SOFT TISSUE
Status: DISCONTINUED | OUTPATIENT
Start: 2024-06-10 | End: 2024-06-10

## 2024-06-10 RX ORDER — ONDANSETRON HYDROCHLORIDE 2 MG/ML
4 INJECTION, SOLUTION INTRAVENOUS ONCE AS NEEDED
Status: DISCONTINUED | OUTPATIENT
Start: 2024-06-10 | End: 2024-06-12 | Stop reason: HOSPADM

## 2024-06-10 RX ADMIN — SODIUM CHLORIDE, SODIUM LACTATE, POTASSIUM CHLORIDE, AND CALCIUM CHLORIDE: .6; .31; .03; .02 INJECTION, SOLUTION INTRAVENOUS at 07:06

## 2024-06-10 RX ADMIN — HEPARIN SODIUM 5000 UNITS: 5000 INJECTION INTRAVENOUS; SUBCUTANEOUS at 02:06

## 2024-06-10 RX ADMIN — HEPARIN SODIUM 5000 UNITS: 5000 INJECTION INTRAVENOUS; SUBCUTANEOUS at 05:06

## 2024-06-10 RX ADMIN — OXYBUTYNIN CHLORIDE 5 MG: 5 TABLET ORAL at 11:06

## 2024-06-10 RX ADMIN — MIDAZOLAM HYDROCHLORIDE 2 MG: 1 INJECTION, SOLUTION INTRAMUSCULAR; INTRAVENOUS at 07:06

## 2024-06-10 RX ADMIN — PHENYLEPHRINE HYDROCHLORIDE 100 MCG: 10 INJECTION INTRAVENOUS at 07:06

## 2024-06-10 RX ADMIN — Medication 20 MG: at 07:06

## 2024-06-10 RX ADMIN — PROPOFOL 125 MCG/KG/MIN: 10 INJECTION, EMULSION INTRAVENOUS at 07:06

## 2024-06-10 RX ADMIN — HYDROMORPHONE HYDROCHLORIDE 0.5 MG: 2 INJECTION, SOLUTION INTRAMUSCULAR; INTRAVENOUS; SUBCUTANEOUS at 08:06

## 2024-06-10 RX ADMIN — HYDROCODONE BITARTRATE AND ACETAMINOPHEN 1 TABLET: 5; 325 TABLET ORAL at 07:06

## 2024-06-10 RX ADMIN — MORPHINE SULFATE 4 MG: 4 INJECTION INTRAVENOUS at 06:06

## 2024-06-10 RX ADMIN — HYDROCODONE BITARTRATE AND ACETAMINOPHEN 1 TABLET: 5; 325 TABLET ORAL at 10:06

## 2024-06-10 RX ADMIN — HYDROCODONE BITARTRATE AND ACETAMINOPHEN 1 TABLET: 5; 325 TABLET ORAL at 01:06

## 2024-06-10 RX ADMIN — ONDANSETRON 8 MG: 2 INJECTION, SOLUTION INTRAMUSCULAR; INTRAVENOUS at 07:06

## 2024-06-10 RX ADMIN — Medication 30 MG: at 07:06

## 2024-06-10 RX ADMIN — TAMSULOSIN HYDROCHLORIDE 0.4 MG: 0.4 CAPSULE ORAL at 10:06

## 2024-06-10 RX ADMIN — SODIUM CHLORIDE: 9 INJECTION, SOLUTION INTRAVENOUS at 10:06

## 2024-06-10 RX ADMIN — FENTANYL CITRATE 25 MCG: 50 INJECTION INTRAMUSCULAR; INTRAVENOUS at 07:06

## 2024-06-10 RX ADMIN — DEXAMETHASONE SODIUM PHOSPHATE 8 MG: 4 INJECTION, SOLUTION INTRA-ARTICULAR; INTRALESIONAL; INTRAMUSCULAR; INTRAVENOUS; SOFT TISSUE at 07:06

## 2024-06-10 RX ADMIN — ACETAMINOPHEN 650 MG: 325 TABLET ORAL at 04:06

## 2024-06-10 RX ADMIN — CIPROFLOXACIN HYDROCHLORIDE 750 MG: 250 TABLET, FILM COATED ORAL at 11:06

## 2024-06-10 RX ADMIN — LIDOCAINE HYDROCHLORIDE 100 MG: 20 INJECTION, SOLUTION INTRAVENOUS at 07:06

## 2024-06-10 RX ADMIN — MORPHINE SULFATE 4 MG: 4 INJECTION INTRAVENOUS at 02:06

## 2024-06-10 NOTE — PROGRESS NOTES
[facility] Medicine  Progress Note    Patient Name: Omkar Gan  MRN: [id number]  Patient Class: IP- Inpatient   Admission Date: 6/9/2024  Length of Stay: 0 days  Attending Physician: Radha Wilson MD  Primary Care Provider: Funmilayo Kumar A.M., MD        Subjective:     Principal Problem:Nephrolithiasis        HPI:  Omkar Gan is a 49-year-old female with a past medical history of renal stones, hypertension, allergies presents with left-sided flank pain over the past week.    Patient states that she has had left-sided flank pain associated nausea over the past couple of days.  She reports that it feels like her prior episodes of kidney stones.  She is taken multiple over-the-counter medications with minimal relief.  She denies any gross hematuria, dysuria, abdominal pain.    Triage vitals significant for elevated blood pressures.  Review of systems significant for nausea and flank pain.  Physical exam significant for left CVA tenderness.    CBC and CMP are nearly completely unremarkable.  UA with WBCs and leukocytes but no bacteria.  Urine culture pending.    CT renal shows severe hydronephrosis in the left and obstructing 9 mm calculus in the mid left ureter again.  Nonobstructing calculi noted in the right.    Urology consulted and plans for intervention in the a.m..  Patient placed NPO.    Patient admitted for nephrolithiasis and hydronephrosis.    Overview/Hospital Course:  No notes on file    Interval History: Nursing notes reviewed and no acute events overnight. VSS. Voiding w/o issue. Pt c/o Left flank pain 8/10, ordered Morphine 4mg IV. POD#0 S/p Cystoscopy w/ left JJ ureteral stent placement w/o complications. Trending CBC/BMP, BARBARA resolved. Urine cultures pending. Continuing Rocephin, Flomax scheduled.    Review of Systems   Constitutional:  Negative for chills and fever.   Respiratory:  Negative for shortness of breath.    Cardiovascular:  Negative for chest pain.    Gastrointestinal:  Negative for abdominal pain, diarrhea, nausea and vomiting.   Genitourinary:  Positive for flank pain. Negative for difficulty urinating and dysuria.   Neurological:  Negative for light-headedness.     Objective:     Vital Signs (Most Recent):  Temp: 97.5 °F (36.4 °C) (06/10/24 0830)  Pulse: 64 (06/10/24 0840)  Resp: 20 (06/10/24 0840)  BP: (!) 157/91 (06/10/24 0840)  SpO2: 100 % (06/10/24 0840) Vital Signs (24h Range):  Temp:  [97.4 °F (36.3 °C)-98.3 °F (36.8 °C)] 97.5 °F (36.4 °C)  Pulse:  [61-84] 64  Resp:  [14-20] 20  SpO2:  [98 %-100 %] 100 %  BP: (142-195)/() 157/91     Weight: 106.4 kg (234 lb 9.1 oz)  Body mass index is 37.86 kg/m².    Intake/Output Summary (Last 24 hours) at 6/10/2024 0848  Last data filed at 6/10/2024 0814  Gross per 24 hour   Intake 1274.15 ml   Output --   Net 1274.15 ml         Physical Exam  Vitals and nursing note reviewed.   Constitutional:       General: She is not in acute distress.     Appearance: She is obese. She is not toxic-appearing.   HENT:      Head: Normocephalic and atraumatic.      Nose: Nose normal.   Eyes:      Extraocular Movements: Extraocular movements intact.   Cardiovascular:      Rate and Rhythm: Normal rate and regular rhythm.      Heart sounds: Normal heart sounds.   Pulmonary:      Effort: Pulmonary effort is normal.      Breath sounds: Normal breath sounds.   Abdominal:      General: There is no distension.      Tenderness: There is no abdominal tenderness. There is left CVA tenderness.   Musculoskeletal:      Cervical back: Normal range of motion.   Skin:     General: Skin is warm.      Coloration: Skin is not jaundiced.      Findings: No bruising.   Neurological:      General: No focal deficit present.      Mental Status: She is alert.      Cranial Nerves: No cranial nerve deficit.   Psychiatric:         Mood and Affect: Mood normal.             Significant Labs: All pertinent labs within the past 24 hours have been  reviewed.    Significant Imaging: I have reviewed all pertinent imaging results/findings within the past 24 hours.    Assessment/Plan:      * Nephrolithiasis  Patient was significant left flank pain.  CT imaging concerning for obstructing calculi in the mid left ureter with resultant hydronephrosis. Urology consulted, appreciate rec's    Plan:  POD#0 S/p Cystoscopy w/ left JJ ureteral stent placement w/o complications.   Trending CBC/BMP, BARBARA resolved and Cr back to baseline.   Urine cultures pending.   Discontinued Continuous NS 100mL/hr.  Voiding well  Continuing Rocephin   Flomax scheduled.  Continue with pain control    She will need oxybutynin, flomax, and pyridium on discharge.   She will f/u for left ureteroscopy for definitive stone management 6/24/2024       Hydronephrosis  Hydronephrosis noted in the left ureter secondary to stone    Plan:  POD#0 S/p Cystoscopy w/ left JJ ureteral stent placement w/o complications  Daily BMP, CMP, BARBARA resolved      Morbid obesity with body mass index (BMI) of 40.0 to 44.9 in adult  Body mass index is 37.86 kg/m². Morbid obesity complicates all aspects of disease management from diagnostic modalities to treatment. Weight loss encouraged and health benefits explained to patient.           VTE Risk Mitigation (From admission, onward)           Ordered     heparin (porcine) injection 5,000 Units  Every 8 hours         06/09/24 2302     IP VTE HIGH RISK PATIENT  Once         06/09/24 2302     Place sequential compression device  Until discontinued         06/09/24 2302                    Discharge Planning   LUPE:      Code Status: Full Code   Is the patient medically ready for discharge?:     Reason for patient still in hospital (select all that apply): Patient trending condition, Laboratory test, and Treatment  Discharge Plan A: Home                  Mckay Moran PA-C  Department of Hospital Medicine   [facility] S[facility]

## 2024-06-10 NOTE — PLAN OF CARE
Problem: Adult Inpatient Plan of Care  Goal: Plan of Care Review  Outcome: Progressing     VIRTUAL NURSE:  Cued into patient's room.  Lights off; unable to view room.  Introduced as VN for night shift that will be working with floor nurse and nursing assistant.  Permission received per patient to review admit assessment questions.  Educated patient on VN's role in patient care and VIP model.  Plan of care reviewed with patient.  Education per flowsheet.   Informed patient that staff will round on them every 2 hours but to use call light for any other needs they may have; informed of fall risk and fall precautions.  Patient verbalized understanding.  Opportunity given for questions and questions answered.  Instructed to call for assistance.  Will cont to monitor and intervene as needed.     Labs, notes, orders, and careplan initiated.        06/10/24 0019   Patient Request   Patient Requested lights off and unable to view patient. c/o abd pain 7/10   Nurse Notification   Bedside Nurse Notified? Yes   Name of Bedside Nurse Darling AMAYA RN   Nurse Notfication Method Secure Chat   Nurse Notified Of Patient Request   Admission   Initial VN Admission Questions Complete   Communication Issues? Technical Issue   Shift   Virtual Nurse - Rounding Complete   Pain Management Interventions pain management plan reviewed with patient/caregiver   Virtual Nurse - Patient Verbalized Approval Of Camera Use;VN Rounding   Type of Frequent Check   Type Patient Rounds   Safety/Activity   Safety Promotion/Fall Prevention medications reviewed;room near unit station;Supervised toileting - stay within arms reach;instructed to call staff for mobility   Safety Precautions emergency equipment at bedside   Pain/Comfort/Sleep   Comfort/Acceptable Pain Level 2   Pain Body Location abdomen   Pain Rating (0-10): Rest 7   POSS (Pasero Opioid-Induced Sed Scale) 1 - Awake and alert   Sleep/Rest/Relaxation no problem identified;awake

## 2024-06-10 NOTE — ASSESSMENT & PLAN NOTE
-OR today for left ureteral stent placement  -Consented and marked  -NPO  -F/u urine culture  -Discussed that stent is not permanent and she will need outpatient Urologic f/u to discuss stone management. Will need left ureteroscopy 2-4 weeks  -Discussed it is normal to see blood in the urine following stent placement  -Okay for pm discharge if no significant pyonephrosis seen

## 2024-06-10 NOTE — HPI
Omkar Gan is a 49-year-old female with a past medical history of renal stones, hypertension, allergies presents with left-sided flank pain over the past week.    Patient states that she has had left-sided flank pain associated nausea over the past couple of days.  She reports that it feels like her prior episodes of kidney stones.  She is taken multiple over-the-counter medications with minimal relief.  She denies any gross hematuria, dysuria, abdominal pain.    Triage vitals significant for elevated blood pressures.  Review of systems significant for nausea and flank pain.  Physical exam significant for left CVA tenderness.    CBC and CMP are nearly completely unremarkable.  UA with WBCs and leukocytes but no bacteria.  Urine culture pending.    CT renal shows severe hydronephrosis in the left and obstructing 9 mm calculus in the mid left ureter again.  Nonobstructing calculi noted in the right.    Urology consulted and plans for intervention in the a.m..  Patient placed NPO.    Patient admitted for nephrolithiasis and hydronephrosis.

## 2024-06-10 NOTE — ANESTHESIA PREPROCEDURE EVALUATION
06/10/2024  Omkar Gan is a 49 y.o., female.      Pre-op Assessment     I have reviewed the Nursing Notes.    I have reviewed the Medications.     Review of Systems  Anesthesia Hx:  No problems with previous Anesthesia             Denies Family Hx of Anesthesia complications.     Social:  Non-Smoker, No Alcohol Use       Hematology/Oncology:  Hematology Normal   Oncology Normal                                   EENT/Dental:  EENT/Dental Normal           Cardiovascular:  Exercise tolerance: good   Hypertension                                  Hypertension         Pulmonary:  Pulmonary Normal                       Renal/:  Chronic Renal Disease        Kidney Function/Disease             Hepatic/GI:  Hepatic/GI Normal                 Musculoskeletal:  Musculoskeletal Normal                Neurological:  Neurology Normal                                      Endocrine:  Endocrine Normal                Physical Exam  General: Well nourished    Airway:  Mallampati: II / II  Mouth Opening: Normal  TM Distance: Normal  Tongue: Normal  Neck ROM: Normal ROM    Dental:  Intact        Anesthesia Plan  Type of Anesthesia, risks & benefits discussed:    Anesthesia Type: Gen Natural Airway  Intra-op Monitoring Plan: Standard ASA Monitors  Post Op Pain Control Plan: multimodal analgesia  Induction:  IV  Airway Plan: Direct, Post-Induction  Informed Consent: Informed consent signed with the Patient and all parties understand the risks and agree with anesthesia plan.  All questions answered.   ASA Score: 3    Ready For Surgery From Anesthesia Perspective.     .

## 2024-06-10 NOTE — SUBJECTIVE & OBJECTIVE
Past Medical History:   Diagnosis Date    Allergy     Graves disease     Graves disease     History of kidney stones     History of uterine fibroid     Hypertension     Kidney stone        Past Surgical History:   Procedure Laterality Date    FACIAL RECONSTRUCTION SURGERY      FACIAL RECONSTRUCTION SURGERY      FACIAL RECONSTRUCTION SURGERY      HYSTERECTOMY         Review of patient's allergies indicates:   Allergen Reactions    Penicillins Nausea And Vomiting     + dizziness       Family History       Problem Relation (Age of Onset)    Hypertension Maternal Grandmother    Pacemaker/defibrilator Maternal Grandmother            Tobacco Use    Smoking status: Never    Smokeless tobacco: Never   Substance and Sexual Activity    Alcohol use: Yes     Alcohol/week: 7.0 standard drinks of alcohol     Types: 7 Glasses of wine per week     Comment: states having a drink or two every day    Drug use: No    Sexual activity: Not Currently     Partners: Male       Review of Systems   Constitutional:  Negative for chills and fever.   Genitourinary:  Positive for hematuria. Negative for dysuria and frequency.       Objective:     Temp:  [97.4 °F (36.3 °C)-98.3 °F (36.8 °C)] 97.4 °F (36.3 °C)  Pulse:  [61-84] 61  Resp:  [16-20] 17  SpO2:  [98 %-100 %] 98 %  BP: (143-195)/() 143/77  Weight: 106.4 kg (234 lb 9.1 oz)  Body mass index is 37.86 kg/m².           Drains       None                    Physical Exam  Constitutional:       General: She is not in acute distress.     Appearance: Normal appearance.   HENT:      Head: Normocephalic.   Eyes:      Pupils: Pupils are equal, round, and reactive to light.   Cardiovascular:      Rate and Rhythm: Normal rate.   Pulmonary:      Effort: Pulmonary effort is normal. No respiratory distress.   Chest:      Chest wall: No tenderness.   Abdominal:      General: Abdomen is flat. There is no distension.      Palpations: Abdomen is soft.      Tenderness: There is no abdominal tenderness.  There is left CVA tenderness. There is no right CVA tenderness.   Musculoskeletal:         General: Normal range of motion.      Cervical back: Normal range of motion.   Neurological:      General: No focal deficit present.      Mental Status: She is alert and oriented to person, place, and time.   Psychiatric:         Mood and Affect: Mood normal.         Behavior: Behavior normal.          Significant Labs:    BMP:  Recent Labs   Lab 06/09/24 2001      K 3.5      CO2 24   BUN 22*   CREATININE 0.9   CALCIUM 10.0       CBC:  Recent Labs   Lab 06/09/24  2001 06/10/24  0448   WBC 9.36 6.79   HGB 12.6 12.0   HCT 37.5 36.7*    211       All pertinent labs results from the past 24 hours have been reviewed.    Significant Imaging:  All pertinent imaging results/findings from the past 24 hours have been reviewed.

## 2024-06-10 NOTE — NURSING TRANSFER
Nursing Transfer Note      6/10/2024   9:05 AM    Nurse giving handoff:Charles  Nurse receiving handoff: Shameka CARO. No distress, oriented x4.  Pain 2/10

## 2024-06-10 NOTE — HPI
Omkar Gan is a 50 yo female with pmh of kidneys stones and HTN, presented to ED with left-sided flank pain over the past week.     Left flank pain associated with nausea.  She reports that it feels like her prior episodes of kidney stones. She denies any gross hematuria, dysuria, abdominal pain, fevers, chills.     Cr 0.9 (baseline 0.7-0.8). WBC 9.3  UA nitrite negative, rare bacteria.  Urine culture pending. On rocephin     CT renal shows severe hydronephrosis in the left and obstructing 9 mm calculus in the mid left ureter.  Nonobstructing calculi noted in the right.

## 2024-06-10 NOTE — PLAN OF CARE
SW met with Pt at bedside. Pt demographics confirmed. Pt independent with ADL's. Pt reports no need for HHS or DME. Pt not a dialysis or Coumadin Pt. Pt lives with minor son. Pt will transports self home or family will come get Pt care from parking lot and transport at D/C. No other needs identified. SW to request f/u as needed. SW to assist with any future needs.     Future Appointments   Date Time Provider Department Center   8/27/2024  3:20 PM Funmilayo Kumar A.M., MD [facility] S[facility]  Discharge Assessment    Primary Care Provider: Funmilayo Kumar A.M., MD     Discharge Assessment (most recent)       BRIEF DISCHARGE ASSESSMENT - 06/10/24 1326          Discharge Planning    Assessment Type Discharge Planning Brief Assessment (P)      Resource/Environmental Concerns none (P)      Support Systems Children (P)      Equipment Currently Used at Home none (P)      Current Living Arrangements home (P)      Patient/Family Anticipates Transition to home;home with family (P)      Patient/Family Anticipated Services at Transition none (P)      DME Needed Upon Discharge  none (P)      Discharge Plan A Home (P)      Discharge Plan B Home with family (P)                          Margaux Ferrell LMSW  Phone: [phone]  [facility]

## 2024-06-10 NOTE — ASSESSMENT & PLAN NOTE
Patient was significant left flank pain.  CT imaging concerning for obstructing calculi in the mid left ureter with resultant hydronephrosis. Urology consulted, appreciate rec's    Plan:  POD#0 S/p Cystoscopy w/ left JJ ureteral stent placement w/o complications.   Trending CBC/BMP, BARBARA resolved and Cr back to baseline.   Urine cultures pending.   Discontinued Continuous NS 100mL/hr.  Voiding well  Continuing Rocephin   Flomax scheduled.  Continue with pain control    She will need oxybutynin, flomax, and pyridium on discharge.   She will f/u for left ureteroscopy for definitive stone management 6/24/2024

## 2024-06-10 NOTE — ASSESSMENT & PLAN NOTE
Patient was significant left flank pain.  CT imaging concerning for obstructing calculi in the mid left ureter with resultant hydronephrosis    Plan:  NPO  Plan for urology intervention in the a.m.  Continuous fluids  Continue with pain control

## 2024-06-10 NOTE — SUBJECTIVE & OBJECTIVE
Past Medical History:   Diagnosis Date    Allergy     Graves disease     Graves disease     History of kidney stones     History of uterine fibroid     Hypertension     Kidney stone        Past Surgical History:   Procedure Laterality Date    FACIAL RECONSTRUCTION SURGERY      FACIAL RECONSTRUCTION SURGERY      FACIAL RECONSTRUCTION SURGERY      HYSTERECTOMY         Review of patient's allergies indicates:   Allergen Reactions    Penicillins Nausea And Vomiting     + dizziness       No current facility-administered medications on file prior to encounter.     No current outpatient medications on file prior to encounter.     Family History       Problem Relation (Age of Onset)    Hypertension Maternal Grandmother    Pacemaker/defibrilator Maternal Grandmother          Tobacco Use    Smoking status: Never    Smokeless tobacco: Never   Substance and Sexual Activity    Alcohol use: Yes     Alcohol/week: 3.0 standard drinks of alcohol     Types: 3 Glasses of wine per week     Comment: occasionally    Drug use: No    Sexual activity: Not Currently     Partners: Male     Review of Systems   Constitutional:  Negative for appetite change and chills.   HENT:  Negative for ear discharge and ear pain.    Respiratory:  Negative for cough and choking.    Cardiovascular:  Negative for chest pain and leg swelling.   Gastrointestinal:  Positive for nausea. Negative for anal bleeding and blood in stool.   Endocrine: Negative for polydipsia and polyphagia.   Genitourinary:  Positive for flank pain. Negative for hematuria.   Musculoskeletal:  Negative for back pain and gait problem.   Skin:  Negative for pallor and rash.   Allergic/Immunologic: Negative for food allergies and immunocompromised state.   Neurological:  Negative for seizures and speech difficulty.   Hematological:  Negative for adenopathy. Does not bruise/bleed easily.   Psychiatric/Behavioral:  Negative for decreased concentration and dysphoric mood.      Objective:  "    Vital Signs (Most Recent):  Temp: 98.3 °F (36.8 °C) (06/09/24 2252)  Pulse: 84 (06/09/24 2252)  Resp: 16 (06/09/24 2252)  BP: (!) 166/83 (06/09/24 1919)  SpO2: 99 % (06/09/24 2252) Vital Signs (24h Range):  Temp:  [98.1 °F (36.7 °C)-98.3 °F (36.8 °C)] 98.3 °F (36.8 °C)  Pulse:  [80-84] 84  Resp:  [16-20] 16  SpO2:  [99 %] 99 %  BP: (166)/(83) 166/83     Weight: 112.9 kg (248 lb 14.4 oz)  Body mass index is 40.17 kg/m².     Physical Exam  Vitals reviewed.   Constitutional:       General: She is in acute distress.      Appearance: She is not toxic-appearing.   HENT:      Right Ear: There is no impacted cerumen.      Left Ear: There is no impacted cerumen.      Nose: No congestion or rhinorrhea.      Mouth/Throat:      Pharynx: No oropharyngeal exudate or posterior oropharyngeal erythema.   Eyes:      General:         Right eye: No discharge.         Left eye: No discharge.   Cardiovascular:      Rate and Rhythm: Normal rate.      Heart sounds: No murmur heard.     No gallop.   Pulmonary:      Breath sounds: No wheezing or rales.   Abdominal:      General: There is no distension.      Tenderness: There is no abdominal tenderness. There is left CVA tenderness.   Musculoskeletal:         General: No swelling or deformity.      Cervical back: No rigidity.   Lymphadenopathy:      Cervical: No cervical adenopathy.   Skin:     Coloration: Skin is not jaundiced.      Findings: No bruising.   Neurological:      General: No focal deficit present.      Cranial Nerves: No cranial nerve deficit.      Motor: No weakness.   Psychiatric:         Mood and Affect: Mood normal.                Significant Labs: All pertinent labs within the past 24 hours have been reviewed.  Blood Culture: No results for input(s): "LABBLOO" in the last 48 hours.  BMP:   Recent Labs   Lab 06/09/24 2001         K 3.5      CO2 24   BUN 22*   CREATININE 0.9   CALCIUM 10.0     CBC:   Recent Labs   Lab 06/09/24 2001   WBC 9.36   HGB " "12.6   HCT 37.5        CMP:   Recent Labs   Lab 06/09/24 2001      K 3.5      CO2 24      BUN 22*   CREATININE 0.9   CALCIUM 10.0   PROT 7.4   ALBUMIN 4.0   BILITOT 0.3   ALKPHOS 61   AST 15   ALT 10   ANIONGAP 11     Prealbumin: No results for input(s): "PREALBUMIN" in the last 48 hours.  Troponin: No results for input(s): "TROPONINI", "TROPONINIHS" in the last 48 hours.  Urine Culture: No results for input(s): "LABURIN" in the last 48 hours.    Significant Imaging: I have reviewed all pertinent imaging results/findings within the past 24 hours.  I have reviewed and interpreted all pertinent imaging results/findings within the past 24 hours.  "

## 2024-06-10 NOTE — TRANSFER OF CARE
"Anesthesia Transfer of Care Note    Patient: Omkar Gan    Procedure(s) Performed: Procedure(s) (LRB):  CYSTOSCOPY, left retrograde pyelogram, left JJ stent (Left)  CYSTOURETEROSCOPY,WITH HOLMIUM LASER LITHOTRIPSY OF URETERAL CALCULUS (Left)  INSERTION, STENT, URETER (Left)    Patient location: PACU    Anesthesia Type: MAC    Transport from OR: Transported from OR on room air with adequate spontaneous ventilation    Post pain: adequate analgesia    Post assessment: no apparent anesthetic complications    Post vital signs: stable    Level of consciousness: awake and lethargic    Nausea/Vomiting: no nausea/vomiting    Complications: none    Transfer of care protocol was followed      Last vitals: Visit Vitals  BP (!) 143/77 (BP Location: Right arm, Patient Position: Lying)   Pulse 61   Temp 36.3 °C (97.4 °F) (Oral)   Resp 17   Ht 5' 6" (1.676 m)   Wt 106.4 kg (234 lb 9.1 oz)   SpO2 98%   Breastfeeding No   BMI 37.86 kg/m²     "

## 2024-06-10 NOTE — H&P
[facility] Medicine  History & Physical    Patient Name: Omkar Gan  MRN: [id number]  Patient Class: OP- Observation  Admission Date: 6/9/2024  Attending Physician: Radha Wilson MD   Primary Care Provider: Funmilayo Kumar A.M., MD         Patient information was obtained from patient and ER records.     Subjective:     Principal Problem:Nephrolithiasis    Chief Complaint:   Chief Complaint   Patient presents with    Flank Pain     R flank pain x 1 week. Hx of kidney stones. Patient reports it Feels like a kidney stone. Unrelieved by motrin. +nausea. Denies dysuria, fever, vomiting.         HPI: Omkar Gan is a 49-year-old female with a past medical history of renal stones, hypertension, allergies presents with left-sided flank pain over the past week.    Patient states that she has had left-sided flank pain associated nausea over the past couple of days.  She reports that it feels like her prior episodes of kidney stones.  She is taken multiple over-the-counter medications with minimal relief.  She denies any gross hematuria, dysuria, abdominal pain.    Triage vitals significant for elevated blood pressures.  Review of systems significant for nausea and flank pain.  Physical exam significant for left CVA tenderness.    CBC and CMP are nearly completely unremarkable.  UA with WBCs and leukocytes but no bacteria.  Urine culture pending.    CT renal shows severe hydronephrosis in the left and obstructing 9 mm calculus in the mid left ureter again.  Nonobstructing calculi noted in the right.    Urology consulted and plans for intervention in the a.m..  Patient placed NPO.    Patient admitted for nephrolithiasis and hydronephrosis.    Past Medical History:   Diagnosis Date    Allergy     Graves disease     Graves disease     History of kidney stones     History of uterine fibroid     Hypertension     Kidney stone        Past Surgical History:   Procedure Laterality Date    FACIAL  RECONSTRUCTION SURGERY      FACIAL RECONSTRUCTION SURGERY      FACIAL RECONSTRUCTION SURGERY      HYSTERECTOMY         Review of patient's allergies indicates:   Allergen Reactions    Penicillins Nausea And Vomiting     + dizziness       No current facility-administered medications on file prior to encounter.     No current outpatient medications on file prior to encounter.     Family History       Problem Relation (Age of Onset)    Hypertension Maternal Grandmother    Pacemaker/defibrilator Maternal Grandmother          Tobacco Use    Smoking status: Never    Smokeless tobacco: Never   Substance and Sexual Activity    Alcohol use: Yes     Alcohol/week: 3.0 standard drinks of alcohol     Types: 3 Glasses of wine per week     Comment: occasionally    Drug use: No    Sexual activity: Not Currently     Partners: Male     Review of Systems   Constitutional:  Negative for appetite change and chills.   HENT:  Negative for ear discharge and ear pain.    Respiratory:  Negative for cough and choking.    Cardiovascular:  Negative for chest pain and leg swelling.   Gastrointestinal:  Positive for nausea. Negative for anal bleeding and blood in stool.   Endocrine: Negative for polydipsia and polyphagia.   Genitourinary:  Positive for flank pain. Negative for hematuria.   Musculoskeletal:  Negative for back pain and gait problem.   Skin:  Negative for pallor and rash.   Allergic/Immunologic: Negative for food allergies and immunocompromised state.   Neurological:  Negative for seizures and speech difficulty.   Hematological:  Negative for adenopathy. Does not bruise/bleed easily.   Psychiatric/Behavioral:  Negative for decreased concentration and dysphoric mood.      Objective:     Vital Signs (Most Recent):  Temp: 98.3 °F (36.8 °C) (06/09/24 2252)  Pulse: 84 (06/09/24 2252)  Resp: 16 (06/09/24 2252)  BP: (!) 166/83 (06/09/24 1919)  SpO2: 99 % (06/09/24 2252) Vital Signs (24h Range):  Temp:  [98.1 °F (36.7 °C)-98.3 °F (36.8 °C)]  "98.3 °F (36.8 °C)  Pulse:  [80-84] 84  Resp:  [16-20] 16  SpO2:  [99 %] 99 %  BP: (166)/(83) 166/83     Weight: 112.9 kg (248 lb 14.4 oz)  Body mass index is 40.17 kg/m².     Physical Exam  Vitals reviewed.   Constitutional:       General: She is in acute distress.      Appearance: She is not toxic-appearing.   HENT:      Right Ear: There is no impacted cerumen.      Left Ear: There is no impacted cerumen.      Nose: No congestion or rhinorrhea.      Mouth/Throat:      Pharynx: No oropharyngeal exudate or posterior oropharyngeal erythema.   Eyes:      General:         Right eye: No discharge.         Left eye: No discharge.   Cardiovascular:      Rate and Rhythm: Normal rate.      Heart sounds: No murmur heard.     No gallop.   Pulmonary:      Breath sounds: No wheezing or rales.   Abdominal:      General: There is no distension.      Tenderness: There is no abdominal tenderness. There is left CVA tenderness.   Musculoskeletal:         General: No swelling or deformity.      Cervical back: No rigidity.   Lymphadenopathy:      Cervical: No cervical adenopathy.   Skin:     Coloration: Skin is not jaundiced.      Findings: No bruising.   Neurological:      General: No focal deficit present.      Cranial Nerves: No cranial nerve deficit.      Motor: No weakness.   Psychiatric:         Mood and Affect: Mood normal.                Significant Labs: All pertinent labs within the past 24 hours have been reviewed.  Blood Culture: No results for input(s): "LABBLOO" in the last 48 hours.  BMP:   Recent Labs   Lab 06/09/24 2001         K 3.5      CO2 24   BUN 22*   CREATININE 0.9   CALCIUM 10.0     CBC:   Recent Labs   Lab 06/09/24 2001   WBC 9.36   HGB 12.6   HCT 37.5        CMP:   Recent Labs   Lab 06/09/24 2001      K 3.5      CO2 24      BUN 22*   CREATININE 0.9   CALCIUM 10.0   PROT 7.4   ALBUMIN 4.0   BILITOT 0.3   ALKPHOS 61   AST 15   ALT 10   ANIONGAP 11     Prealbumin: " "No results for input(s): "PREALBUMIN" in the last 48 hours.  Troponin: No results for input(s): "TROPONINI", "TROPONINIHS" in the last 48 hours.  Urine Culture: No results for input(s): "LABURIN" in the last 48 hours.    Significant Imaging: I have reviewed all pertinent imaging results/findings within the past 24 hours.  I have reviewed and interpreted all pertinent imaging results/findings within the past 24 hours.  Assessment/Plan:     * Nephrolithiasis  Patient was significant left flank pain.  CT imaging concerning for obstructing calculi in the mid left ureter with resultant hydronephrosis    Plan:  NPO  Plan for urology intervention in the a.m.  Continuous fluids  Continue with pain control      Hydronephrosis  Hydronephrosis noted in the left ureter secondary to stone    Plan:  Urological intervention a.m.  Daily BMP      Morbid obesity with body mass index (BMI) of 40.0 to 44.9 in adult  Body mass index is 40.17 kg/m². Morbid obesity complicates all aspects of disease management from diagnostic modalities to treatment. Weight loss encouraged and health benefits explained to patient.           VTE Risk Mitigation (From admission, onward)           Ordered     heparin (porcine) injection 5,000 Units  Every 8 hours         06/09/24 2302     IP VTE HIGH RISK PATIENT  Once         06/09/24 2302     Place sequential compression device  Until discontinued         06/09/24 2302                       On 06/09/2024, patient should be placed in hospital observation services under my care.             Josh Mckenzie MD  Department of Hospital Medicine  [facility] - Emergency Dept          "

## 2024-06-10 NOTE — ANESTHESIA POSTPROCEDURE EVALUATION
Anesthesia Post Evaluation    Patient: Omkar Gan    Procedure(s) Performed: Procedure(s) (LRB):  CYSTOSCOPY, left retrograde pyelogram, left JJ stent (Left)  CYSTOURETEROSCOPY,WITH HOLMIUM LASER LITHOTRIPSY OF URETERAL CALCULUS (Left)  INSERTION, STENT, URETER (Left)    Final Anesthesia Type: general      Patient location during evaluation: PACU  Patient participation: Yes- Able to Participate  Level of consciousness: awake and alert  Post-procedure vital signs: reviewed and stable  Pain management: adequate  Airway patency: patent    PONV status at discharge: No PONV  Anesthetic complications: no      Cardiovascular status: blood pressure returned to baseline and hemodynamically stable  Respiratory status: unassisted  Hydration status: euvolemic  Follow-up not needed.              Vitals Value Taken Time   /86 06/10/24 0939   Temp 36.3 °C (97.4 °F) 06/10/24 0939   Pulse 57 06/10/24 0939   Resp 18 06/10/24 1050   SpO2 99 % 06/10/24 0939         Event Time   Out of Recovery 06/10/2024 09:18:18         Pain/Farhan Score: Pain Rating Prior to Med Admin: 10 (6/10/2024 10:50 AM)  Pain Rating Post Med Admin: 2 (6/10/2024  9:00 AM)

## 2024-06-10 NOTE — ASSESSMENT & PLAN NOTE
Hydronephrosis noted in the left ureter secondary to stone    Plan:  POD#0 S/p Cystoscopy w/ left JJ ureteral stent placement w/o complications  Daily BMP, CMP, BARBARA resolved

## 2024-06-10 NOTE — ASSESSMENT & PLAN NOTE
Hydronephrosis noted in the left ureter secondary to stone    Plan:  Urological intervention a.m.  Daily BMP

## 2024-06-10 NOTE — OP NOTE
[facility]  Operative Note    Date: 06/10/2024    Pre-Op Diagnosis: left ureteral stone    Patient Active Problem List    Diagnosis Date Noted    Nephrolithiasis 06/09/2024    Hydronephrosis 06/09/2024    Prediabetes 09/13/2023    Morbid obesity with body mass index (BMI) of 40.0 to 44.9 in adult 09/05/2023    Breast hypertrophy in female 09/05/2023    Suspected Adrenal adenoma 11/09/2014    Diverticulitis 11/09/2014       Post-Op Diagnosis: same    Procedure(s) Performed:   1. Left ureteroscopy with stone lasering  2. Cystoscopy  3. Left retrograde pyelogram  4. Left ureteral stent placement  5. Fluoro < 1 hr    Specimen(s): none    Staff Surgeon: Pasha Garcia MD    Assistant Surgeon: Samir Escamilla MD    Anesthesia: Monitored Local Anesthesia with Sedation    Indications: Omkar Gan is a 49 y.o. female with a left ureteral stone presented to the ED with left flank pain    Findings:  1. Stone is on  film.  2. Severely impacted stone in the left mid ureter. Unable to pass a wire or visualize contrast past stone during retrograde pyelogram  3. Left URS performed and stone lasered to allow passage of motion wire to kidney  4. Difficult placement of L ureteral stent, but stent appears to be at renal pelvis and in line with anatomic ureteral position    Estimated Blood Loss: min    Drains:   1. Left 6 Fr x 26 cm JJ ureteral stent without strings    Procedure in detail:  After risks, benefits, and possible complications were explained, the patient elected to undergo the procedure and informed consent was obtained. All questions were answered in the lana-operative area. The patient was transferred to the cystoscopy suite and placed in the supine position. SCDs were applied and working. Anesthesia was administered. The patient was then placed in the dorsal lithotomy position and prepped and draped in the usual sterile fashion. Time out was performed, and lana-procedural antibiotics were confirmed.      The stone was  on  film. A rigid cystoscope in a 22 Fr sheath was introduced into the patient's urethra. This passed easily. The entire urethra was visualized which showed  strictures or masses. Cystoscopy revealed the ureteral orifices in the normal anatomic location bilaterally.    A motion wire was passed up the left ureteral orifice was unable to be passed beyond the stone. Lateral displacement of ureter due to stone in mid ureter. A 5 Fr open ended catheter passed up to the stone to try and assist passage of wire. Unable to pass. Angle motion wire then attempted and unable to be passed beyond the stone. Wires removed and a retrograde pyelogram performed and no contrast was seen beyond the stone. Motion wire replaced to the stone and 5 Fr open ended catheter removed.    An 8 Fr rigid ureteroscope was passed into the patient's bladder alongside the wire under direct vision. It was then passed through the left ureteral orifice alongside the wire. A stone was encountered at the level of the left mid ureter. This was severely impacted.  A Bambuser laser fiber was passed through the ureteroscope. A whole was made within the stone to allow passage of a wire. There was significant edema at and just distal to the stone.     A sensor wire was inserted through the ureteroscope and into the kidney with fluoroscopic confirmation. We then inserted a angle tip hydrophilic wire through the stone to the kidney to confirm placement. The ureteroscope was then removed leaving both wires in place.    The cystoscope was backloaded over the sensor wire and advanced into the bladder. We then passed a 6 Fr x 26 cm JJ ureteral stent without strings over the wire to the level of the renal pelvis under direct vision as well as flouroscopy. The wire was removed. A 180 degree coil was observed in the renal pelvis as well as the bladder using fluoroscopy. A 180 degree coil was also seen using direct visualization in the bladder. The  angle tip hydrophilic wire then removed. The bladder was drained and the cystoscope was removed.    The patient tolerated the procedure well and was transferred to the recovery room in stable condition.    Disposition:  The patient will return to her hospital room after the pacu. Recommend repeat cbc and cmp tomorrow morning. She will need oxybutynin, flomax, and pyridium on discharge. She will f/u for left ureteroscopy for definitive stone management.    Samir Escamilla MD    I was present and scrubbed for the entirety of the procedure.  I agree with the operative note and have edited as needed.    Pasha Jeffries MD

## 2024-06-10 NOTE — ASSESSMENT & PLAN NOTE
Body mass index is 40.17 kg/m². Morbid obesity complicates all aspects of disease management from diagnostic modalities to treatment. Weight loss encouraged and health benefits explained to patient.

## 2024-06-10 NOTE — CONSULTS
[facility]  Urology  Consult Note    Patient Name: Omkar Gan  MRN: [id number]  Admission Date: 6/9/2024  Hospital Length of Stay: 0   Code Status: Full Code   Attending Provider: Radha Wilson MD   Consulting Provider: Samir Escamilla MD  Primary Care Physician: Funmilayo Kumar A.M., MD  Principal Problem:Nephrolithiasis    Inpatient consult to Urology  Consult performed by: Samir Escamilla MD  Consult ordered by: Pasha Jeffries MD  Reason for consult: left ureteral stone          Subjective:     HPI:  Omkar Gan is a 50 yo female with pmh of kidneys stones and HTN, presented to ED with left-sided flank pain over the past week.     Left flank pain associated with nausea.  She reports that it feels like her prior episodes of kidney stones. She denies any gross hematuria, dysuria, abdominal pain, fevers, chills.     Cr 0.9 (baseline 0.7-0.8). WBC 9.3  UA nitrite negative, rare bacteria.  Urine culture pending. On rocephin     CT renal shows severe hydronephrosis in the left and obstructing 9 mm calculus in the mid left ureter.  Nonobstructing calculi noted in the right.    Past Medical History:   Diagnosis Date    Allergy     Graves disease     Graves disease     History of kidney stones     History of uterine fibroid     Hypertension     Kidney stone        Past Surgical History:   Procedure Laterality Date    FACIAL RECONSTRUCTION SURGERY      FACIAL RECONSTRUCTION SURGERY      FACIAL RECONSTRUCTION SURGERY      HYSTERECTOMY         Review of patient's allergies indicates:   Allergen Reactions    Penicillins Nausea And Vomiting     + dizziness       Family History       Problem Relation (Age of Onset)    Hypertension Maternal Grandmother    Pacemaker/defibrilator Maternal Grandmother            Tobacco Use    Smoking status: Never    Smokeless tobacco: Never   Substance and Sexual Activity    Alcohol use: Yes     Alcohol/week: 7.0 standard drinks of alcohol     Types: 7 Glasses of wine per week      Comment: states having a drink or two every day    Drug use: No    Sexual activity: Not Currently     Partners: Male       Review of Systems   Constitutional:  Negative for chills and fever.   Genitourinary:  Positive for hematuria. Negative for dysuria and frequency.       Objective:     Temp:  [97.4 °F (36.3 °C)-98.3 °F (36.8 °C)] 97.4 °F (36.3 °C)  Pulse:  [61-84] 61  Resp:  [16-20] 17  SpO2:  [98 %-100 %] 98 %  BP: (143-195)/() 143/77  Weight: 106.4 kg (234 lb 9.1 oz)  Body mass index is 37.86 kg/m².           Drains       None                    Physical Exam  Constitutional:       General: She is not in acute distress.     Appearance: Normal appearance.   HENT:      Head: Normocephalic.   Eyes:      Pupils: Pupils are equal, round, and reactive to light.   Cardiovascular:      Rate and Rhythm: Normal rate.   Pulmonary:      Effort: Pulmonary effort is normal. No respiratory distress.   Chest:      Chest wall: No tenderness.   Abdominal:      General: Abdomen is flat. There is no distension.      Palpations: Abdomen is soft.      Tenderness: There is no abdominal tenderness. There is left CVA tenderness. There is no right CVA tenderness.   Musculoskeletal:         General: Normal range of motion.      Cervical back: Normal range of motion.   Neurological:      General: No focal deficit present.      Mental Status: She is alert and oriented to person, place, and time.   Psychiatric:         Mood and Affect: Mood normal.         Behavior: Behavior normal.          Significant Labs:    BMP:  Recent Labs   Lab 06/09/24 2001      K 3.5      CO2 24   BUN 22*   CREATININE 0.9   CALCIUM 10.0       CBC:  Recent Labs   Lab 06/09/24  2001 06/10/24  0448   WBC 9.36 6.79   HGB 12.6 12.0   HCT 37.5 36.7*    211       All pertinent labs results from the past 24 hours have been reviewed.    Significant Imaging:  All pertinent imaging results/findings from the past 24 hours have been  reviewed.                    Assessment and Plan:     * Nephrolithiasis  -OR today for left ureteral stent placement  -Consented and marked  -NPO  -F/u urine culture  -Discussed that stent is not permanent and she will need outpatient Urologic f/u to discuss stone management. Will need left ureteroscopy 2-4 weeks  -Discussed it is normal to see blood in the urine following stent placement  -Okay for pm discharge if no significant pyonephrosis seen        VTE Risk Mitigation (From admission, onward)           Ordered     heparin (porcine) injection 5,000 Units  Every 8 hours         06/09/24 2302     IP VTE HIGH RISK PATIENT  Once         06/09/24 2302     Place sequential compression device  Until discontinued         06/09/24 2302                    Thank you for your consult. I will follow-up with patient. Please contact us if you have any additional questions.    Samir Escamilla MD  Urology  [facility]

## 2024-06-10 NOTE — SUBJECTIVE & OBJECTIVE
Interval History: Nursing notes reviewed and no acute events overnight. VSS. Voiding w/o issue. Pt c/o Left flank pain 8/10, ordered Morphine 4mg IV. POD#0 S/p Cystoscopy w/ left JJ ureteral stent placement w/o complications. Trending CBC/BMP, BARBARA resolved. Urine cultures pending. Continuing Rocephin, Flomax scheduled.    Review of Systems   Constitutional:  Negative for chills and fever.   Respiratory:  Negative for shortness of breath.    Cardiovascular:  Negative for chest pain.   Gastrointestinal:  Negative for abdominal pain, diarrhea, nausea and vomiting.   Genitourinary:  Positive for flank pain. Negative for difficulty urinating and dysuria.   Neurological:  Negative for light-headedness.     Objective:     Vital Signs (Most Recent):  Temp: 97.5 °F (36.4 °C) (06/10/24 0830)  Pulse: 64 (06/10/24 0840)  Resp: 20 (06/10/24 0840)  BP: (!) 157/91 (06/10/24 0840)  SpO2: 100 % (06/10/24 0840) Vital Signs (24h Range):  Temp:  [97.4 °F (36.3 °C)-98.3 °F (36.8 °C)] 97.5 °F (36.4 °C)  Pulse:  [61-84] 64  Resp:  [14-20] 20  SpO2:  [98 %-100 %] 100 %  BP: (142-195)/() 157/91     Weight: 106.4 kg (234 lb 9.1 oz)  Body mass index is 37.86 kg/m².    Intake/Output Summary (Last 24 hours) at 6/10/2024 0848  Last data filed at 6/10/2024 0814  Gross per 24 hour   Intake 1274.15 ml   Output --   Net 1274.15 ml         Physical Exam  Vitals and nursing note reviewed.   Constitutional:       General: She is not in acute distress.     Appearance: She is obese. She is not toxic-appearing.   HENT:      Head: Normocephalic and atraumatic.      Nose: Nose normal.   Eyes:      Extraocular Movements: Extraocular movements intact.   Cardiovascular:      Rate and Rhythm: Normal rate and regular rhythm.      Heart sounds: Normal heart sounds.   Pulmonary:      Effort: Pulmonary effort is normal.      Breath sounds: Normal breath sounds.   Abdominal:      General: There is no distension.      Tenderness: There is no abdominal  tenderness. There is left CVA tenderness.   Musculoskeletal:      Cervical back: Normal range of motion.   Skin:     General: Skin is warm.      Coloration: Skin is not jaundiced.      Findings: No bruising.   Neurological:      General: No focal deficit present.      Mental Status: She is alert.      Cranial Nerves: No cranial nerve deficit.   Psychiatric:         Mood and Affect: Mood normal.             Significant Labs: All pertinent labs within the past 24 hours have been reviewed.    Significant Imaging: I have reviewed all pertinent imaging results/findings within the past 24 hours.

## 2024-06-10 NOTE — PLAN OF CARE
Pt AAOx4. PRN morphine, Norco, and Tylenol given for c/o flank pain, headache. No c/o N/V. Medications administered per MAR. NS infusing. On RA. NPO since MN for procedure today. Pre-op prep done. Bed in lowest position with wheels locked and side rails raised x2. Call light in reach.

## 2024-06-10 NOTE — ED PROVIDER NOTES
Encounter Date: 6/9/2024       History     Chief Complaint   Patient presents with    Flank Pain     R flank pain x 1 week. Hx of kidney stones. Patient reports it Feels like a kidney stone. Unrelieved by motrin. +nausea. Denies dysuria, fever, vomiting.      Patient is a 49-year-old female who presents with complaint of left-sided flank pain X 1 week with intermittent nausea.  Patient has a history of kidney stones and states this feels similar.  Denies fever, chills, gross hematuria, dysuria, urinary frequency, abdominal pain, rashes, or any other complaints at this time.    The history is provided by the patient.     Review of patient's allergies indicates:   Allergen Reactions    Penicillins Nausea And Vomiting     + dizziness     Past Medical History:   Diagnosis Date    Allergy     Graves disease     Graves disease     History of kidney stones     History of uterine fibroid     Hypertension     Kidney stone      Past Surgical History:   Procedure Laterality Date    FACIAL RECONSTRUCTION SURGERY      FACIAL RECONSTRUCTION SURGERY      FACIAL RECONSTRUCTION SURGERY      HYSTERECTOMY       Family History   Problem Relation Name Age of Onset    Hypertension Maternal Grandmother      Pacemaker/defibrilator Maternal Grandmother       Social History     Tobacco Use    Smoking status: Never    Smokeless tobacco: Never   Substance Use Topics    Alcohol use: Yes     Alcohol/week: 3.0 standard drinks of alcohol     Types: 3 Glasses of wine per week     Comment: occasionally    Drug use: No     Review of Systems   Constitutional:  Negative for chills and fever.   Respiratory:  Negative for shortness of breath and wheezing.    Cardiovascular:  Negative for chest pain.   Gastrointestinal:  Positive for nausea. Negative for abdominal distention, abdominal pain, diarrhea and vomiting.   Genitourinary:  Positive for flank pain. Negative for dysuria, hematuria, pelvic pain, urgency and vaginal bleeding.   Musculoskeletal:   Negative for back pain.   Skin:  Negative for rash.   All other systems reviewed and are negative.      Physical Exam     Initial Vitals [06/09/24 1919]   BP Pulse Resp Temp SpO2   (!) 166/83 80 20 98.1 °F (36.7 °C) 99 %      MAP       --         Physical Exam    Constitutional: She appears well-developed and well-nourished.   HENT:   Head: Normocephalic and atraumatic.   Cardiovascular:  Normal rate.           Abdominal: Abdomen is soft. She exhibits no distension. There is no abdominal tenderness.   No right CVA tenderness.  There is left CVA tenderness. There is no rebound and no guarding.     Neurological: She is alert.         ED Course   Procedures  Labs Reviewed   URINALYSIS, REFLEX TO URINE CULTURE - Abnormal; Notable for the following components:       Result Value    Protein, UA Trace (*)     Occult Blood UA 1+ (*)     Leukocytes, UA 1+ (*)     All other components within normal limits    Narrative:     Specimen Source->Urine   COMPREHENSIVE METABOLIC PANEL - Abnormal; Notable for the following components:    BUN 22 (*)     All other components within normal limits   URINALYSIS MICROSCOPIC - Abnormal; Notable for the following components:    WBC, UA 31 (*)     All other components within normal limits    Narrative:     Specimen Source->Urine   CULTURE, URINE   CBC W/ AUTO DIFFERENTIAL          Imaging Results               CT Renal Stone Study ABD Pelvis WO (Final result)  Result time 06/09/24 22:19:23      Final result by Shasha Bowser MD (06/09/24 22:19:23)                   Impression:      Severe hydronephrosis on the left and obstructing 9 mm calculus in the mid left ureter again noted.    Nonobstructing calculi in the right.    Incidental findings as above.    This report was flagged in Epic as abnormal.      Electronically signed by: Shasha Bowser  Date:    06/09/2024  Time:    22:19               Narrative:    EXAMINATION:  CT RENAL STONE STUDY ABD PELVIS WO    CLINICAL HISTORY:  Flank  pain, kidney stone suspected;    TECHNIQUE:  Low dose axial images, sagittal and coronal reformations were obtained from the lung bases to the pubic symphysis without contrast.    COMPARISON:  CT abdomen pelvis 09/02/2020.    FINDINGS:  Abdomen:    - Lower thorax:Heart is stable.    - Lung bases: No infiltrates and no nodules.    - Liver: No focal mass.    - Gallbladder: No calcified gallstones.    - Bile Ducts: No evidence of intra or extra hepatic biliary ductal dilation.    - Spleen: Negative.    - Kidneys: There is severe hydronephrosis on the left secondary to a 9 mm calculus in the mid 3rd of the ureter similar prior.  Nonobstructing punctate calculus in the right kidney lower pole with a 2nd in the upper pole..    - Adrenals: Unremarkable.    - Pancreas: No mass or peripancreatic fat stranding.    - Retroperitoneum:  No significant adenopathy.    - Vascular: No abdominal aortic aneurysm.    - Abdominal wall:  Unremarkable.    Pelvis:    Bladder is unremarkable.  Patient is post hysterectomy.  No pelvic mass, adenopathy, or free fluid.    Bowel/Mesentery:    No evidence of bowel obstruction or inflammation.    Bones:  No acute osseous abnormality and no suspicious lytic or blastic lesion.                                       Medications   ketorolac injection 15 mg (15 mg Intravenous Given 6/9/24 2057)   cefTRIAXone (Rocephin) 1 g in dextrose 5 % in water (D5W) 100 mL IVPB (MB+) (1 g Intravenous New Bag 6/9/24 2205)   ondansetron disintegrating tablet 4 mg (4 mg Oral Given 6/9/24 2205)     Medical Decision Making  Patient is an afebrile, well-appearing 49 y.o. female who here with left flank pain X 1 week. The patient has not been vomiting. Patient is tolerating PO. VSS.     Differential diagnosis for urinary symptoms includes but is not limited to cystitis, pyelonephritis, ureterolithiasis, nephrolithiasis, lumbar/thoracic strain    After review of the patients physical exam, ED testing, and history/symptoms,  in discussion with urologist on-call, the patient will be admitted for possible stenting tomorrow.  [facility] called and case was discussed. Admit orders will be completed. The diagnosis, treatment and plan for admission were discussed with the patient and understanding was verbalized. All questions or concerns have been addressed.     Amount and/or Complexity of Data Reviewed  External Data Reviewed: notes.  Labs: ordered. Decision-making details documented in ED Course.  Radiology: ordered and independent interpretation performed. Decision-making details documented in ED Course.    Risk  Prescription drug management.               ED Course as of 06/09/24 2245   Sun Jun 09, 2024 2057 CBC auto differential  CBC unremarkable.  [OB]   2108 eGFR: >60 [OB]   2108 Creatinine: 0.9 [OB]   2108 BUN(!): 22  BUN slightly elevated, creatinine and GFR WNL. [OB]   2108 Leukocyte Esterase, UA(!): 1+ [OB]   2108 NITRITE UA: Negative [OB]   2108 Blood, UA(!): 1+ [OB]   2108 WBC, UA(!): 31 [OB]   2109 RBC, UA: 4 [OB]   2222 CT renal study: Impression:     Severe hydronephrosis on the left and obstructing 9 mm calculus in the mid left ureter again noted.     Nonobstructing calculi in the right. [OB]   2224 Speaking with Dr. Jeffries. Says that patient can be admitted for possible stent tomorrow. NPO at midnight.  [OB]      ED Course User Index  [OB] Danelle Marie PA-C                           Clinical Impression:  Final diagnoses:  [N20.0] Left nephrolithiasis (Primary)          ED Disposition Condition    Observation Stable                Danelle Marie PA-C  06/09/24 2245

## 2024-06-10 NOTE — ASSESSMENT & PLAN NOTE
Body mass index is 37.86 kg/m². Morbid obesity complicates all aspects of disease management from diagnostic modalities to treatment. Weight loss encouraged and health benefits explained to patient.

## 2024-06-10 NOTE — PLAN OF CARE
VIRTUAL NURSE:  Labs, notes, orders, and careplan reviewed. VN to be available as needed.    Problem: Adult Inpatient Plan of Care  Goal: Plan of Care Review  6/10/2024 0945 by Niyah Castillo, RN  Outcome: Progressing  6/10/2024 0945 by Niyah Castillo, RN  Outcome: Progressing

## 2024-06-11 LAB
ANION GAP SERPL CALC-SCNC: 10 MMOL/L (ref 8–16)
BASOPHILS # BLD AUTO: 0.03 K/UL (ref 0–0.2)
BASOPHILS NFR BLD: 0.3 % (ref 0–1.9)
BUN SERPL-MCNC: 14 MG/DL (ref 6–20)
CALCIUM SERPL-MCNC: 9.6 MG/DL (ref 8.7–10.5)
CHLORIDE SERPL-SCNC: 105 MMOL/L (ref 95–110)
CO2 SERPL-SCNC: 25 MMOL/L (ref 23–29)
CREAT SERPL-MCNC: 0.8 MG/DL (ref 0.5–1.4)
DIFFERENTIAL METHOD BLD: ABNORMAL
EOSINOPHIL # BLD AUTO: 0 K/UL (ref 0–0.5)
EOSINOPHIL NFR BLD: 0.2 % (ref 0–8)
ERYTHROCYTE [DISTWIDTH] IN BLOOD BY AUTOMATED COUNT: 13.5 % (ref 11.5–14.5)
EST. GFR  (NO RACE VARIABLE): >60 ML/MIN/1.73 M^2
GLUCOSE SERPL-MCNC: 100 MG/DL (ref 70–110)
HCT VFR BLD AUTO: 38.1 % (ref 37–48.5)
HGB BLD-MCNC: 12.9 G/DL (ref 12–16)
IMM GRANULOCYTES # BLD AUTO: 0.02 K/UL (ref 0–0.04)
IMM GRANULOCYTES NFR BLD AUTO: 0.2 % (ref 0–0.5)
LYMPHOCYTES # BLD AUTO: 1.6 K/UL (ref 1–4.8)
LYMPHOCYTES NFR BLD: 18 % (ref 18–48)
MCH RBC QN AUTO: 31.1 PG (ref 27–31)
MCHC RBC AUTO-ENTMCNC: 33.9 G/DL (ref 32–36)
MCV RBC AUTO: 92 FL (ref 82–98)
MONOCYTES # BLD AUTO: 0.7 K/UL (ref 0.3–1)
MONOCYTES NFR BLD: 7.5 % (ref 4–15)
NEUTROPHILS # BLD AUTO: 6.7 K/UL (ref 1.8–7.7)
NEUTROPHILS NFR BLD: 73.8 % (ref 38–73)
NRBC BLD-RTO: 0 /100 WBC
PLATELET # BLD AUTO: 229 K/UL (ref 150–450)
PMV BLD AUTO: 10.7 FL (ref 9.2–12.9)
POTASSIUM SERPL-SCNC: 3.8 MMOL/L (ref 3.5–5.1)
RBC # BLD AUTO: 4.15 M/UL (ref 4–5.4)
SODIUM SERPL-SCNC: 140 MMOL/L (ref 136–145)
WBC # BLD AUTO: 9.05 K/UL (ref 3.9–12.7)

## 2024-06-11 PROCEDURE — 11000001 HC ACUTE MED/SURG PRIVATE ROOM

## 2024-06-11 PROCEDURE — 25000003 PHARM REV CODE 250: Performed by: UROLOGY

## 2024-06-11 PROCEDURE — 99232 SBSQ HOSP IP/OBS MODERATE 35: CPT | Mod: ,,, | Performed by: UROLOGY

## 2024-06-11 PROCEDURE — 63600175 PHARM REV CODE 636 W HCPCS: Performed by: STUDENT IN AN ORGANIZED HEALTH CARE EDUCATION/TRAINING PROGRAM

## 2024-06-11 PROCEDURE — 85025 COMPLETE CBC W/AUTO DIFF WBC: CPT | Performed by: STUDENT IN AN ORGANIZED HEALTH CARE EDUCATION/TRAINING PROGRAM

## 2024-06-11 PROCEDURE — 36415 COLL VENOUS BLD VENIPUNCTURE: CPT | Performed by: STUDENT IN AN ORGANIZED HEALTH CARE EDUCATION/TRAINING PROGRAM

## 2024-06-11 PROCEDURE — 25000003 PHARM REV CODE 250: Performed by: STUDENT IN AN ORGANIZED HEALTH CARE EDUCATION/TRAINING PROGRAM

## 2024-06-11 PROCEDURE — 80048 BASIC METABOLIC PNL TOTAL CA: CPT | Performed by: STUDENT IN AN ORGANIZED HEALTH CARE EDUCATION/TRAINING PROGRAM

## 2024-06-11 RX ADMIN — HYDROCODONE BITARTRATE AND ACETAMINOPHEN 1 TABLET: 5; 325 TABLET ORAL at 05:06

## 2024-06-11 RX ADMIN — HEPARIN SODIUM 5000 UNITS: 5000 INJECTION INTRAVENOUS; SUBCUTANEOUS at 05:06

## 2024-06-11 RX ADMIN — TAMSULOSIN HYDROCHLORIDE 0.4 MG: 0.4 CAPSULE ORAL at 08:06

## 2024-06-11 RX ADMIN — HYDROCODONE BITARTRATE AND ACETAMINOPHEN 1 TABLET: 5; 325 TABLET ORAL at 03:06

## 2024-06-11 RX ADMIN — HYDROCODONE BITARTRATE AND ACETAMINOPHEN 1 TABLET: 5; 325 TABLET ORAL at 09:06

## 2024-06-11 RX ADMIN — ACETAMINOPHEN 650 MG: 325 TABLET ORAL at 09:06

## 2024-06-11 RX ADMIN — ACETAMINOPHEN 650 MG: 325 TABLET ORAL at 08:06

## 2024-06-11 RX ADMIN — CIPROFLOXACIN HYDROCHLORIDE 750 MG: 250 TABLET, FILM COATED ORAL at 08:06

## 2024-06-11 RX ADMIN — Medication 6 MG: at 08:06

## 2024-06-11 RX ADMIN — HEPARIN SODIUM 5000 UNITS: 5000 INJECTION INTRAVENOUS; SUBCUTANEOUS at 09:06

## 2024-06-11 RX ADMIN — HEPARIN SODIUM 5000 UNITS: 5000 INJECTION INTRAVENOUS; SUBCUTANEOUS at 02:06

## 2024-06-11 NOTE — ASSESSMENT & PLAN NOTE
Hydronephrosis noted in the left ureter secondary to stone    Plan:  POD#1 S/p Cystoscopy w/ left JJ ureteral stent placement w/o complications  Daily BMP, CMP, BARBARA resolved

## 2024-06-11 NOTE — PROGRESS NOTES
[facility]  Progress Note    Patient Name: Omkar Gan  MRN: [id number]  Patient Class: IP- Inpatient   Admission Date: 6/9/2024  Length of Stay: 1 days  Attending Physician: Claudette Tsai MD  Primary Care Provider: Funmilayo Kumar A.M., MD        Subjective:     Principal Problem:Nephrolithiasis        HPI:  Omkar Gan is a 49-year-old female with a past medical history of renal stones, hypertension, allergies presents with left-sided flank pain over the past week.    Patient states that she has had left-sided flank pain associated nausea over the past couple of days.  She reports that it feels like her prior episodes of kidney stones.  She is taken multiple over-the-counter medications with minimal relief.  She denies any gross hematuria, dysuria, abdominal pain.    Triage vitals significant for elevated blood pressures.  Review of systems significant for nausea and flank pain.  Physical exam significant for left CVA tenderness.    CBC and CMP are nearly completely unremarkable.  UA with WBCs and leukocytes but no bacteria.  Urine culture pending.    CT renal shows severe hydronephrosis in the left and obstructing 9 mm calculus in the mid left ureter again.  Nonobstructing calculi noted in the right.    Urology consulted and plans for intervention in the a.m..  Patient placed NPO.    Patient admitted for nephrolithiasis and hydronephrosis.    Overview/Hospital Course:  No notes on file    Interval History: Nursing notes reviewed and no acute events overnight. VSS. Pain well controled on current regimen. Pt tolerating diet and voiding. Urine cultures pending.    Review of Systems   Constitutional:  Negative for chills and fever.   Respiratory:  Negative for shortness of breath.    Cardiovascular:  Negative for chest pain.   Gastrointestinal:  Negative for abdominal pain, diarrhea, nausea and vomiting.   Genitourinary:  Positive for flank pain. Negative for difficulty urinating  and dysuria.   Neurological:  Negative for light-headedness.     Objective:     Vital Signs (Most Recent):  Temp: 97.8 °F (36.6 °C) (06/11/24 1500)  Pulse: 76 (06/11/24 1500)  Resp: 18 (06/11/24 1509)  BP: (!) 143/81 (06/11/24 1500)  SpO2: 100 % (06/11/24 1500) Vital Signs (24h Range):  Temp:  [97.5 °F (36.4 °C)-97.9 °F (36.6 °C)] 97.8 °F (36.6 °C)  Pulse:  [67-81] 76  Resp:  [18-20] 18  SpO2:  [95 %-100 %] 100 %  BP: (142-174)/(70-95) 143/81     Weight: 106.4 kg (234 lb 9.1 oz)  Body mass index is 37.86 kg/m².    Intake/Output Summary (Last 24 hours) at 6/11/2024 1727  Last data filed at 6/11/2024 0637  Gross per 24 hour   Intake --   Output 700 ml   Net -700 ml         Physical Exam  Vitals and nursing note reviewed.   Constitutional:       General: She is not in acute distress.     Appearance: She is obese. She is not toxic-appearing.   HENT:      Head: Normocephalic and atraumatic.      Nose: Nose normal.   Eyes:      Extraocular Movements: Extraocular movements intact.   Cardiovascular:      Rate and Rhythm: Normal rate and regular rhythm.      Heart sounds: Normal heart sounds.   Pulmonary:      Effort: Pulmonary effort is normal.      Breath sounds: Normal breath sounds.   Abdominal:      General: There is no distension.      Tenderness: There is no abdominal tenderness. There is left CVA tenderness.   Musculoskeletal:      Cervical back: Normal range of motion.   Skin:     General: Skin is warm.      Coloration: Skin is not jaundiced.      Findings: No bruising.   Neurological:      General: No focal deficit present.      Mental Status: She is alert.      Cranial Nerves: No cranial nerve deficit.   Psychiatric:         Mood and Affect: Mood normal.             Significant Labs: All pertinent labs within the past 24 hours have been reviewed.    Significant Imaging: I have reviewed all pertinent imaging results/findings within the past 24 hours.    Assessment/Plan:      * Nephrolithiasis  Patient was  significant left flank pain.  CT imaging concerning for obstructing calculi in the mid left ureter with resultant hydronephrosis. Urology consulted, appreciate rec's    Plan:  POD#0 S/p Cystoscopy w/ left JJ ureteral stent placement w/o complications.   Trending CBC/BMP, BARBARA resolved and Cr back to baseline.   Urine cultures pending.   Discontinued Continuous NS 100mL/hr.  Voiding well  Continuing Rocephin   Flomax scheduled.  Continue with pain control    She will need oxybutynin, flomax, and pyridium on discharge.   She will f/u for left ureteroscopy for definitive stone management 6/24/2024       Hydronephrosis  Hydronephrosis noted in the left ureter secondary to stone    Plan:  POD#1 S/p Cystoscopy w/ left JJ ureteral stent placement w/o complications  Daily BMP, CMP, BARBARA resolved      Morbid obesity with body mass index (BMI) of 40.0 to 44.9 in adult  Body mass index is 37.86 kg/m². Morbid obesity complicates all aspects of disease management from diagnostic modalities to treatment. Weight loss encouraged and health benefits explained to patient.           VTE Risk Mitigation (From admission, onward)           Ordered     heparin (porcine) injection 5,000 Units  Every 8 hours         06/09/24 2302     IP VTE HIGH RISK PATIENT  Once         06/09/24 2302     Place sequential compression device  Until discontinued         06/09/24 2302                    Discharge Planning   LUPE: 6/13/2024     Code Status: Full Code   Is the patient medically ready for discharge?:     Reason for patient still in hospital (select all that apply): Patient trending condition, Laboratory test, Treatment, and Consult recommendations  Discharge Plan A: Home, Home with family                  Mckay Moran PA-C  Department of Hospital Medicine   [facility]

## 2024-06-11 NOTE — ASSESSMENT & PLAN NOTE
-L ureteral stent placed 6/10/24  -F/u urine culture  -Discussed that stent is not permanent and she will need outpatient Urologic f/u to discuss stone management. Will need outpatient ureteroscopy  -Discussed it is normal to see blood in the urine following stent placement  -F/u labs  -On discharge, please order pyridium 200 mg TID PRN dysuria for 3 days #6, oxybutynin 5 mg TID PRN stent pain for 5 days #15, toradol 10 mg q6h PRN pain, a few rescue tablets of oxycodone 5 mg for breakthrough.

## 2024-06-11 NOTE — NURSING
"RAPID RESPONSE NURSE PROACTIVE ROUNDING NOTE     Time of Visit: 2300    Admit Date: 2024  LOS: 1  Code Status: Full Code   Date of Visit: 2024  : 1975  Age: 49 y.o.  Sex: female  Race: Black or   Bed: K528/K528 A:   MRN: [id number]  Was the patient discharged from an ICU this admission? No   Was the patient discharged from a PACU within last 24 hours?  Yes  Did the patient receive conscious sedation/general anesthesia in last 24 hours?  Yes  Was the patient in the ED within the past 24 hours?  No  Was the patient started on NIPPV within the past 24 hours?  No  Attending Physician: Radha Wilson MD  Primary Service: Networked reference to record PCT     ASSESSMENT     Notified by med surg charge RN   Reason for alert: PIV placement    Diagnosis: Nephrolithiasis    Abnormal Vital Signs: BP (!) 157/70 (BP Location: Left arm)   Pulse 75   Temp 97.9 °F (36.6 °C) (Oral)   Resp 20   Ht 5' 6" (1.676 m)   Wt 106.4 kg (234 lb 9.1 oz)   SpO2 95%   Breastfeeding No   BMI 37.86 kg/m²      Clinical Issues:  need for PIV placement    Patient  has a past medical history of Allergy, Graves disease, Graves disease, History of kidney stones, History of uterine fibroid, Hypertension, and Kidney stone.    Notified by med surg charge RN of need for PIV placement. Attempted twice via US guidance. Both attempts ended up blowing. Pt understandably frustrated, had been stuck multiple times prior. Notified bedside LPN, discussed with MD. Will change IV rocephin to p.o. Pt likely to discharge tomorrow per bedside. Contact if pt still needs access, ICU charge RN will come to assess pt.      INTERVENTIONS/ RECOMMENDATIONS     Convert rocephin to p.o.  Contact if continued need for IV placement.  Contact for any concerns.     Discussed plan of care with Unique MOELLER.    PHYSICIAN ESCALATION     Yes/No  Yes by bedside LPN    Orders received and case discussed with Dr. Mckenzie .    Disposition: Remain " in room 528.    FOLLOW-UP     Call back the Rapid Response Nurse, PRABHU KEVIN RN at [facility] Phone: [phone] for additional questions or concerns.

## 2024-06-11 NOTE — PROGRESS NOTES
[facility] S[facility]  Urology  Progress Note    Patient Name: Omkar Gan  MRN: [id number]  Admission Date: 6/9/2024  Hospital Length of Stay: 1 days  Code Status: Full Code   Attending Provider: Claudette Tsai MD   Primary Care Physician: Funmilayo Kumar A.M., MD    Subjective:     HPI:  Omkar Gan is a 50 yo female with pmh of kidneys stones and HTN, presented to ED with left-sided flank pain over the past week.     Left flank pain associated with nausea.  She reports that it feels like her prior episodes of kidney stones. She denies any gross hematuria, dysuria, abdominal pain, fevers, chills.     Cr 0.9 (baseline 0.7-0.8). WBC 9.3  UA nitrite negative, rare bacteria.  Urine culture pending. On rocephin     CT renal shows severe hydronephrosis in the left and obstructing 9 mm calculus in the mid left ureter.  Nonobstructing calculi noted in the right.    Interval History: L ureteral stent placed yesterday. States pain is much better. Voiding spontaneously. Am labs pending    Review of Systems  Objective:     Temp:  [97.4 °F (36.3 °C)-97.9 °F (36.6 °C)] 97.8 °F (36.6 °C)  Pulse:  [54-91] 68  Resp:  [10-20] 20  SpO2:  [95 %-100 %] 100 %  BP: (136-165)/(70-93) 142/70     Body mass index is 37.86 kg/m².           Drains       Drain  Duration                  Ureteral Drain/Stent 06/10/24 0801 Left ureter 6 Fr. <1 day                     Physical Exam  Constitutional:       General: She is not in acute distress.     Appearance: Normal appearance.   HENT:      Head: Normocephalic.   Eyes:      Pupils: Pupils are equal, round, and reactive to light.   Cardiovascular:      Rate and Rhythm: Normal rate.   Pulmonary:      Effort: Pulmonary effort is normal. No respiratory distress.   Chest:      Chest wall: No tenderness.   Abdominal:      General: Abdomen is flat. There is no distension.      Palpations: Abdomen is soft.      Tenderness: There is no abdominal tenderness. There is left CVA tenderness. There is no  right CVA tenderness.   Musculoskeletal:         General: Normal range of motion.      Cervical back: Normal range of motion.   Neurological:      General: No focal deficit present.      Mental Status: She is alert and oriented to person, place, and time.   Psychiatric:         Mood and Affect: Mood normal.         Behavior: Behavior normal.           Significant Labs:    BMP:  Recent Labs   Lab 06/09/24  2001 06/10/24  0448    141   K 3.5 3.7    107   CO2 24 25   BUN 22* 16   CREATININE 0.9 0.8   CALCIUM 10.0 9.2       CBC:   Recent Labs   Lab 06/09/24  2001 06/10/24  0448   WBC 9.36 6.79   HGB 12.6 12.0   HCT 37.5 36.7*    211       All pertinent labs results from the past 24 hours have been reviewed.    Significant Imaging:  All pertinent imaging results/findings from the past 24 hours have been reviewed.                  Assessment/Plan:     * Nephrolithiasis  -L ureteral stent placed 6/10/24  -F/u urine culture  -Discussed that stent is not permanent and she will need outpatient Urologic f/u to discuss stone management. Will need outpatient ureteroscopy  -Discussed it is normal to see blood in the urine following stent placement  -F/u labs  -On discharge, please order pyridium 200 mg TID PRN dysuria for 3 days #6, oxybutynin 5 mg TID PRN stent pain for 5 days #15, toradol 10 mg q6h PRN pain, a few rescue tablets of oxycodone 5 mg for breakthrough.           VTE Risk Mitigation (From admission, onward)           Ordered     heparin (porcine) injection 5,000 Units  Every 8 hours         06/09/24 2302     IP VTE HIGH RISK PATIENT  Once         06/09/24 2302     Place sequential compression device  Until discontinued         06/09/24 2302                    Samir Escamilla MD  Urology  [facility]

## 2024-06-11 NOTE — SUBJECTIVE & OBJECTIVE
Interval History: L ureteral stent placed yesterday. States pain is much better. Voiding spontaneously. Am labs pending    Review of Systems  Objective:     Temp:  [97.4 °F (36.3 °C)-97.9 °F (36.6 °C)] 97.8 °F (36.6 °C)  Pulse:  [54-91] 68  Resp:  [10-20] 20  SpO2:  [95 %-100 %] 100 %  BP: (136-165)/(70-93) 142/70     Body mass index is 37.86 kg/m².           Drains       Drain  Duration                  Ureteral Drain/Stent 06/10/24 0801 Left ureter 6 Fr. <1 day                     Physical Exam  Constitutional:       General: She is not in acute distress.     Appearance: Normal appearance.   HENT:      Head: Normocephalic.   Eyes:      Pupils: Pupils are equal, round, and reactive to light.   Cardiovascular:      Rate and Rhythm: Normal rate.   Pulmonary:      Effort: Pulmonary effort is normal. No respiratory distress.   Chest:      Chest wall: No tenderness.   Abdominal:      General: Abdomen is flat. There is no distension.      Palpations: Abdomen is soft.      Tenderness: There is no abdominal tenderness. There is left CVA tenderness. There is no right CVA tenderness.   Musculoskeletal:         General: Normal range of motion.      Cervical back: Normal range of motion.   Neurological:      General: No focal deficit present.      Mental Status: She is alert and oriented to person, place, and time.   Psychiatric:         Mood and Affect: Mood normal.         Behavior: Behavior normal.           Significant Labs:    BMP:  Recent Labs   Lab 06/09/24  2001 06/10/24  0448    141   K 3.5 3.7    107   CO2 24 25   BUN 22* 16   CREATININE 0.9 0.8   CALCIUM 10.0 9.2       CBC:   Recent Labs   Lab 06/09/24  2001 06/10/24  0448   WBC 9.36 6.79   HGB 12.6 12.0   HCT 37.5 36.7*    211       All pertinent labs results from the past 24 hours have been reviewed.    Significant Imaging:  All pertinent imaging results/findings from the past 24 hours have been reviewed.

## 2024-06-11 NOTE — PLAN OF CARE
Rounded at bedside. Sitting up in bed eating lunch. Denies pain or discomfort at present. Urine cultures pending and results needed before discharge to home. Pt is agreeable with plans for cultures results. When medically ready for discharge, pt will dc to home with family. No DME or HH needs. CM will continue to follow pt and provide any dc needs.   Future Appointments   Date Time Provider Department Center   6/19/2024 11:20 AM Funmilayo Kumar A.M., MD [facility]   8/27/2024  3:20 PM Funmilayo Kumar A.M., MD [facility]      06/11/24 1040   Rounds   Attendance Nurse    Discharge Plan A Home;Home with family   Why the patient remains in the hospital Requires continued medical care   Transition of Care Barriers None

## 2024-06-11 NOTE — SUBJECTIVE & OBJECTIVE
Interval History: Nursing notes reviewed and no acute events overnight. VSS. Pain well controled on current regimen. Pt tolerating diet and voiding. Urine cultures pending.    Review of Systems   Constitutional:  Negative for chills and fever.   Respiratory:  Negative for shortness of breath.    Cardiovascular:  Negative for chest pain.   Gastrointestinal:  Negative for abdominal pain, diarrhea, nausea and vomiting.   Genitourinary:  Positive for flank pain. Negative for difficulty urinating and dysuria.   Neurological:  Negative for light-headedness.     Objective:     Vital Signs (Most Recent):  Temp: 97.8 °F (36.6 °C) (06/11/24 1500)  Pulse: 76 (06/11/24 1500)  Resp: 18 (06/11/24 1509)  BP: (!) 143/81 (06/11/24 1500)  SpO2: 100 % (06/11/24 1500) Vital Signs (24h Range):  Temp:  [97.5 °F (36.4 °C)-97.9 °F (36.6 °C)] 97.8 °F (36.6 °C)  Pulse:  [67-81] 76  Resp:  [18-20] 18  SpO2:  [95 %-100 %] 100 %  BP: (142-174)/(70-95) 143/81     Weight: 106.4 kg (234 lb 9.1 oz)  Body mass index is 37.86 kg/m².    Intake/Output Summary (Last 24 hours) at 6/11/2024 1727  Last data filed at 6/11/2024 0637  Gross per 24 hour   Intake --   Output 700 ml   Net -700 ml         Physical Exam  Vitals and nursing note reviewed.   Constitutional:       General: She is not in acute distress.     Appearance: She is obese. She is not toxic-appearing.   HENT:      Head: Normocephalic and atraumatic.      Nose: Nose normal.   Eyes:      Extraocular Movements: Extraocular movements intact.   Cardiovascular:      Rate and Rhythm: Normal rate and regular rhythm.      Heart sounds: Normal heart sounds.   Pulmonary:      Effort: Pulmonary effort is normal.      Breath sounds: Normal breath sounds.   Abdominal:      General: There is no distension.      Tenderness: There is no abdominal tenderness. There is left CVA tenderness.   Musculoskeletal:      Cervical back: Normal range of motion.   Skin:     General: Skin is warm.      Coloration: Skin is  not jaundiced.      Findings: No bruising.   Neurological:      General: No focal deficit present.      Mental Status: She is alert.      Cranial Nerves: No cranial nerve deficit.   Psychiatric:         Mood and Affect: Mood normal.             Significant Labs: All pertinent labs within the past 24 hours have been reviewed.    Significant Imaging: I have reviewed all pertinent imaging results/findings within the past 24 hours.

## 2024-06-12 VITALS
HEIGHT: 66 IN | HEART RATE: 74 BPM | WEIGHT: 233.69 LBS | OXYGEN SATURATION: 100 % | TEMPERATURE: 98 F | DIASTOLIC BLOOD PRESSURE: 77 MMHG | BODY MASS INDEX: 37.56 KG/M2 | RESPIRATION RATE: 20 BRPM | SYSTOLIC BLOOD PRESSURE: 146 MMHG

## 2024-06-12 PROBLEM — N13.30 HYDRONEPHROSIS: Status: RESOLVED | Noted: 2024-06-09 | Resolved: 2024-06-12

## 2024-06-12 LAB
ANION GAP SERPL CALC-SCNC: 8 MMOL/L (ref 8–16)
BACTERIA UR CULT: NORMAL
BASOPHILS # BLD AUTO: 0.06 K/UL (ref 0–0.2)
BASOPHILS NFR BLD: 0.7 % (ref 0–1.9)
BUN SERPL-MCNC: 18 MG/DL (ref 6–20)
CALCIUM SERPL-MCNC: 9 MG/DL (ref 8.7–10.5)
CHLORIDE SERPL-SCNC: 105 MMOL/L (ref 95–110)
CO2 SERPL-SCNC: 28 MMOL/L (ref 23–29)
CREAT SERPL-MCNC: 0.8 MG/DL (ref 0.5–1.4)
DIFFERENTIAL METHOD BLD: ABNORMAL
EOSINOPHIL # BLD AUTO: 0.2 K/UL (ref 0–0.5)
EOSINOPHIL NFR BLD: 2 % (ref 0–8)
ERYTHROCYTE [DISTWIDTH] IN BLOOD BY AUTOMATED COUNT: 13.9 % (ref 11.5–14.5)
EST. GFR  (NO RACE VARIABLE): >60 ML/MIN/1.73 M^2
GLUCOSE SERPL-MCNC: 95 MG/DL (ref 70–110)
HCT VFR BLD AUTO: 36.7 % (ref 37–48.5)
HGB BLD-MCNC: 12 G/DL (ref 12–16)
IMM GRANULOCYTES # BLD AUTO: 0.01 K/UL (ref 0–0.04)
IMM GRANULOCYTES NFR BLD AUTO: 0.1 % (ref 0–0.5)
LYMPHOCYTES # BLD AUTO: 2.3 K/UL (ref 1–4.8)
LYMPHOCYTES NFR BLD: 28.9 % (ref 18–48)
MCH RBC QN AUTO: 30.8 PG (ref 27–31)
MCHC RBC AUTO-ENTMCNC: 32.7 G/DL (ref 32–36)
MCV RBC AUTO: 94 FL (ref 82–98)
MONOCYTES # BLD AUTO: 0.8 K/UL (ref 0.3–1)
MONOCYTES NFR BLD: 9.5 % (ref 4–15)
NEUTROPHILS # BLD AUTO: 4.7 K/UL (ref 1.8–7.7)
NEUTROPHILS NFR BLD: 58.8 % (ref 38–73)
NRBC BLD-RTO: 0 /100 WBC
PLATELET # BLD AUTO: 200 K/UL (ref 150–450)
PMV BLD AUTO: 11.2 FL (ref 9.2–12.9)
POTASSIUM SERPL-SCNC: 4.5 MMOL/L (ref 3.5–5.1)
RBC # BLD AUTO: 3.9 M/UL (ref 4–5.4)
SODIUM SERPL-SCNC: 141 MMOL/L (ref 136–145)
WBC # BLD AUTO: 8.07 K/UL (ref 3.9–12.7)

## 2024-06-12 PROCEDURE — 25000003 PHARM REV CODE 250: Performed by: INTERNAL MEDICINE

## 2024-06-12 PROCEDURE — 25000003 PHARM REV CODE 250: Performed by: STUDENT IN AN ORGANIZED HEALTH CARE EDUCATION/TRAINING PROGRAM

## 2024-06-12 PROCEDURE — 99232 SBSQ HOSP IP/OBS MODERATE 35: CPT | Mod: ,,, | Performed by: UROLOGY

## 2024-06-12 PROCEDURE — 63600175 PHARM REV CODE 636 W HCPCS: Performed by: STUDENT IN AN ORGANIZED HEALTH CARE EDUCATION/TRAINING PROGRAM

## 2024-06-12 PROCEDURE — 85025 COMPLETE CBC W/AUTO DIFF WBC: CPT | Performed by: STUDENT IN AN ORGANIZED HEALTH CARE EDUCATION/TRAINING PROGRAM

## 2024-06-12 PROCEDURE — 36415 COLL VENOUS BLD VENIPUNCTURE: CPT | Performed by: STUDENT IN AN ORGANIZED HEALTH CARE EDUCATION/TRAINING PROGRAM

## 2024-06-12 PROCEDURE — 25000003 PHARM REV CODE 250: Performed by: UROLOGY

## 2024-06-12 PROCEDURE — 80048 BASIC METABOLIC PNL TOTAL CA: CPT | Performed by: STUDENT IN AN ORGANIZED HEALTH CARE EDUCATION/TRAINING PROGRAM

## 2024-06-12 RX ORDER — PHENAZOPYRIDINE HYDROCHLORIDE 200 MG/1
200 TABLET, FILM COATED ORAL 3 TIMES DAILY PRN
Qty: 9 TABLET | Refills: 0 | Status: SHIPPED | OUTPATIENT
Start: 2024-06-12 | End: 2024-06-15

## 2024-06-12 RX ORDER — CIPROFLOXACIN 500 MG/1
500 TABLET ORAL EVERY 12 HOURS
Qty: 24 TABLET | Refills: 0 | Status: SHIPPED | OUTPATIENT
Start: 2024-06-12 | End: 2024-06-24

## 2024-06-12 RX ORDER — TAMSULOSIN HYDROCHLORIDE 0.4 MG/1
0.4 CAPSULE ORAL DAILY
Qty: 30 CAPSULE | Refills: 11 | Status: SHIPPED | OUTPATIENT
Start: 2024-06-13 | End: 2025-06-13

## 2024-06-12 RX ORDER — OXYBUTYNIN CHLORIDE 5 MG/1
5 TABLET ORAL 3 TIMES DAILY PRN
Qty: 15 TABLET | Refills: 0 | OUTPATIENT
Start: 2024-06-12 | End: 2024-06-19

## 2024-06-12 RX ORDER — KETOROLAC TROMETHAMINE 10 MG/1
10 TABLET, FILM COATED ORAL EVERY 6 HOURS PRN
Qty: 48 TABLET | Refills: 0 | Status: SHIPPED | OUTPATIENT
Start: 2024-06-12 | End: 2024-06-24

## 2024-06-12 RX ORDER — SULFAMETHOXAZOLE AND TRIMETHOPRIM 800; 160 MG/1; MG/1
1 TABLET ORAL 2 TIMES DAILY
Qty: 24 TABLET | Refills: 0 | Status: CANCELLED | OUTPATIENT
Start: 2024-06-12 | End: 2024-06-24

## 2024-06-12 RX ORDER — POLYETHYLENE GLYCOL 3350 17 G/17G
17 POWDER, FOR SOLUTION ORAL DAILY
Status: DISCONTINUED | OUTPATIENT
Start: 2024-06-12 | End: 2024-06-12 | Stop reason: HOSPADM

## 2024-06-12 RX ORDER — OXYCODONE HYDROCHLORIDE 5 MG/1
5 TABLET ORAL DAILY PRN
Qty: 3 TABLET | Refills: 0 | Status: SHIPPED | OUTPATIENT
Start: 2024-06-12 | End: 2024-06-12

## 2024-06-12 RX ORDER — OXYCODONE HYDROCHLORIDE 5 MG/1
5 TABLET ORAL DAILY PRN
Qty: 3 TABLET | Refills: 0 | Status: SHIPPED | OUTPATIENT
Start: 2024-06-12 | End: 2024-06-19

## 2024-06-12 RX ADMIN — HEPARIN SODIUM 5000 UNITS: 5000 INJECTION INTRAVENOUS; SUBCUTANEOUS at 05:06

## 2024-06-12 RX ADMIN — ACETAMINOPHEN 650 MG: 325 TABLET ORAL at 09:06

## 2024-06-12 RX ADMIN — HYDROCODONE BITARTRATE AND ACETAMINOPHEN 1 TABLET: 5; 325 TABLET ORAL at 03:06

## 2024-06-12 RX ADMIN — HYDROCODONE BITARTRATE AND ACETAMINOPHEN 1 TABLET: 5; 325 TABLET ORAL at 09:06

## 2024-06-12 RX ADMIN — CIPROFLOXACIN HYDROCHLORIDE 750 MG: 250 TABLET, FILM COATED ORAL at 09:06

## 2024-06-12 RX ADMIN — TAMSULOSIN HYDROCHLORIDE 0.4 MG: 0.4 CAPSULE ORAL at 09:06

## 2024-06-12 RX ADMIN — POLYETHYLENE GLYCOL 3350 17 G: 17 POWDER, FOR SOLUTION ORAL at 12:06

## 2024-06-12 NOTE — PROGRESS NOTES
[facility] Surg  Urology  Progress Note    Patient Name: Omkar Gan  MRN: [id number]  Admission Date: 6/9/2024  Hospital Length of Stay: 2 days  Code Status: Full Code   Attending Provider: Claudette Tsai MD   Primary Care Physician: Funmilayo Kumar A.M., MD    Subjective:     HPI:  Omkar Gan is a 50 yo female with pmh of kidneys stones and HTN, presented to ED with left-sided flank pain over the past week.     Left flank pain associated with nausea.  She reports that it feels like her prior episodes of kidney stones. She denies any gross hematuria, dysuria, abdominal pain, fevers, chills.     Cr 0.9 (baseline 0.7-0.8). WBC 9.3  UA nitrite negative, rare bacteria.  Urine culture pending. On rocephin     CT renal shows severe hydronephrosis in the left and obstructing 9 mm calculus in the mid left ureter.  Nonobstructing calculi noted in the right.    Interval History: AFVSS. Pain well controlled. Voiding spontaneously    Review of Systems  Objective:     Temp:  [97.4 °F (36.3 °C)-98.4 °F (36.9 °C)] 97.4 °F (36.3 °C)  Pulse:  [73-85] 77  Resp:  [18-20] 18  SpO2:  [99 %-100 %] 100 %  BP: (133-180)/(63-97) 153/79     Body mass index is 37.72 kg/m².           Drains       Drain  Duration                  Ureteral Drain/Stent 06/10/24 0801 Left ureter 6 Fr. 1 day                     Physical Exam  Constitutional:       General: She is not in acute distress.     Appearance: Normal appearance.   HENT:      Head: Normocephalic.   Eyes:      Pupils: Pupils are equal, round, and reactive to light.   Cardiovascular:      Rate and Rhythm: Normal rate.   Pulmonary:      Effort: Pulmonary effort is normal. No respiratory distress.   Chest:      Chest wall: No tenderness.   Abdominal:      General: Abdomen is flat. There is no distension.      Palpations: Abdomen is soft.      Tenderness: There is no abdominal tenderness. There is left CVA tenderness. There is no right CVA tenderness.   Musculoskeletal:          General: Normal range of motion.      Cervical back: Normal range of motion.   Neurological:      General: No focal deficit present.      Mental Status: She is alert and oriented to person, place, and time.   Psychiatric:         Mood and Affect: Mood normal.         Behavior: Behavior normal.           Significant Labs:    BMP:  Recent Labs   Lab 06/10/24  0448 06/11/24  0530 06/12/24  0502    140 141   K 3.7 3.8 4.5    105 105   CO2 25 25 28   BUN 16 14 18   CREATININE 0.8 0.8 0.8   CALCIUM 9.2 9.6 9.0       CBC:   Recent Labs   Lab 06/10/24  0448 06/11/24  0530 06/12/24  0502   WBC 6.79 9.05 8.07   HGB 12.0 12.9 12.0   HCT 36.7* 38.1 36.7*    229 200       All pertinent labs results from the past 24 hours have been reviewed.    Significant Imaging:  All pertinent imaging results/findings from the past 24 hours have been reviewed.                  Assessment/Plan:     * Nephrolithiasis  -L ureteral stent placed 6/10/24  -Urine culture no growth.  -Discussed that stent is not permanent and she will need outpatient Urologic f/u to discuss stone management. Will need outpatient ureteroscopy  -Discussed it is normal to see blood in the urine following stent placement  -On discharge, please order pyridium 200 mg TID PRN dysuria for 3 days #6, oxybutynin 5 mg TID PRN stent pain for 5 days #15, toradol 10 mg q6h PRN pain, a few rescue tablets of oxycodone 5 mg for breakthrough.   -Please DC on po ABX through her procedure 6/24  -Okay for discharge from a urologic perspective          VTE Risk Mitigation (From admission, onward)           Ordered     heparin (porcine) injection 5,000 Units  Every 8 hours         06/09/24 2302     IP VTE HIGH RISK PATIENT  Once         06/09/24 2302     Place sequential compression device  Until discontinued         06/09/24 2302                    Samir Escamilla MD  Urology  [facility]

## 2024-06-12 NOTE — SUBJECTIVE & OBJECTIVE
Interval History: AFVSS. Pain well controlled. Voiding spontaneously    Review of Systems  Objective:     Temp:  [97.4 °F (36.3 °C)-98.4 °F (36.9 °C)] 97.4 °F (36.3 °C)  Pulse:  [73-85] 77  Resp:  [18-20] 18  SpO2:  [99 %-100 %] 100 %  BP: (133-180)/(63-97) 153/79     Body mass index is 37.72 kg/m².           Drains       Drain  Duration                  Ureteral Drain/Stent 06/10/24 0801 Left ureter 6 Fr. 1 day                     Physical Exam  Constitutional:       General: She is not in acute distress.     Appearance: Normal appearance.   HENT:      Head: Normocephalic.   Eyes:      Pupils: Pupils are equal, round, and reactive to light.   Cardiovascular:      Rate and Rhythm: Normal rate.   Pulmonary:      Effort: Pulmonary effort is normal. No respiratory distress.   Chest:      Chest wall: No tenderness.   Abdominal:      General: Abdomen is flat. There is no distension.      Palpations: Abdomen is soft.      Tenderness: There is no abdominal tenderness. There is left CVA tenderness. There is no right CVA tenderness.   Musculoskeletal:         General: Normal range of motion.      Cervical back: Normal range of motion.   Neurological:      General: No focal deficit present.      Mental Status: She is alert and oriented to person, place, and time.   Psychiatric:         Mood and Affect: Mood normal.         Behavior: Behavior normal.           Significant Labs:    BMP:  Recent Labs   Lab 06/10/24  0448 06/11/24  0530 06/12/24  0502    140 141   K 3.7 3.8 4.5    105 105   CO2 25 25 28   BUN 16 14 18   CREATININE 0.8 0.8 0.8   CALCIUM 9.2 9.6 9.0       CBC:   Recent Labs   Lab 06/10/24  0448 06/11/24  0530 06/12/24  0502   WBC 6.79 9.05 8.07   HGB 12.0 12.9 12.0   HCT 36.7* 38.1 36.7*    229 200       All pertinent labs results from the past 24 hours have been reviewed.    Significant Imaging:  All pertinent imaging results/findings from the past 24 hours have been reviewed.

## 2024-06-12 NOTE — PLAN OF CARE
Spoke with pt, she is agreeable with discharge to home today. Follow up appointments added for AVS. Pt car is in parking lot and will transport self home. Information for Cystoscopy added to AVS.   Future Appointments   Date Time Provider Department Center   6/19/2024 11:20 AM Funmilayo Kumar A.M., MD [facility]   8/27/2024  3:20 PM Funmilayo Kumar A.M., MD [facility]      06/12/24 1422   Final Note   Assessment Type Final Discharge Note   Anticipated Discharge Disposition Home   What phone number can be called within the next 1-3 days to see how you are doing after discharge? [phone]   Hospital Resources/Appts/Education Provided Appointments scheduled and added to AVS   Post-Acute Status   Discharge Delays None known at this time

## 2024-06-12 NOTE — NURSING
Received report from Darling, received the patient sitting up, reports pain relief with last prn medication, instructed to call for assistance.

## 2024-06-12 NOTE — NURSING
VN cued into room and permission is given to adjust the camera towards the patient.  Patient is sitting up in chair and in no apparent distress.  AVS reviewed with patient and patient verbalized complete understanding on the discharge instructions.  Patient received all medications from Outpatient Pharmacy.  Wheelchair per Transport placed.  Voiced no other concerns.

## 2024-06-12 NOTE — ASSESSMENT & PLAN NOTE
-L ureteral stent placed 6/10/24  -Urine culture no growth.  -Discussed that stent is not permanent and she will need outpatient Urologic f/u to discuss stone management. Will need outpatient ureteroscopy  -Discussed it is normal to see blood in the urine following stent placement  -On discharge, please order pyridium 200 mg TID PRN dysuria for 3 days #6, oxybutynin 5 mg TID PRN stent pain for 5 days #15, toradol 10 mg q6h PRN pain, a few rescue tablets of oxycodone 5 mg for breakthrough.   -Please DC on po ABX through her procedure 6/24  -Okay for discharge from a urologic perspective

## 2024-06-12 NOTE — PLAN OF CARE
Problem: Adult Inpatient Plan of Care  Goal: Plan of Care Review  Outcome: Progressing  Goal: Patient-Specific Goal (Individualized)  Outcome: Progressing  Goal: Absence of Hospital-Acquired Illness or Injury  Outcome: Progressing  Goal: Optimal Comfort and Wellbeing  Outcome: Progressing  Goal: Readiness for Transition of Care  Outcome: Progressing     Problem: Bariatric Environmental Safety  Goal: Safety Maintained with Care  Outcome: Progressing     Problem: Pain Acute  Goal: Optimal Pain Control and Function  Outcome: Progressing     Problem: UTI (Urinary Tract Infection)  Goal: Improved Infection Symptoms  Outcome: Progressing     Problem: Nausea and Vomiting  Goal: Nausea and Vomiting Relief  Outcome: Progressing     Problem: Wound  Goal: Optimal Coping  Outcome: Progressing  Goal: Optimal Functional Ability  Outcome: Progressing  Goal: Absence of Infection Signs and Symptoms  Outcome: Progressing  Goal: Improved Oral Intake  Outcome: Progressing  Goal: Optimal Pain Control and Function  Outcome: Progressing  Goal: Skin Health and Integrity  Outcome: Progressing  Goal: Optimal Wound Healing  Outcome: Progressing     Problem: Comorbidity Management  Goal: Blood Pressure in Desired Range  Outcome: Progressing     Problem: Fall Injury Risk  Goal: Absence of Fall and Fall-Related Injury  Outcome: Progressing

## 2024-06-12 NOTE — H&P (VIEW-ONLY)
[facility] Surg  Urology  Progress Note    Patient Name: Omkar Gan  MRN: [id number]  Admission Date: 6/9/2024  Hospital Length of Stay: 2 days  Code Status: Full Code   Attending Provider: Claudette Tsai MD   Primary Care Physician: Funmilayo Kumar A.M., MD    Subjective:     HPI:  Omkar Gan is a 48 yo female with pmh of kidneys stones and HTN, presented to ED with left-sided flank pain over the past week.     Left flank pain associated with nausea.  She reports that it feels like her prior episodes of kidney stones. She denies any gross hematuria, dysuria, abdominal pain, fevers, chills.     Cr 0.9 (baseline 0.7-0.8). WBC 9.3  UA nitrite negative, rare bacteria.  Urine culture pending. On rocephin     CT renal shows severe hydronephrosis in the left and obstructing 9 mm calculus in the mid left ureter.  Nonobstructing calculi noted in the right.    Interval History: AFVSS. Pain well controlled. Voiding spontaneously    Review of Systems  Objective:     Temp:  [97.4 °F (36.3 °C)-98.4 °F (36.9 °C)] 97.4 °F (36.3 °C)  Pulse:  [73-85] 77  Resp:  [18-20] 18  SpO2:  [99 %-100 %] 100 %  BP: (133-180)/(63-97) 153/79     Body mass index is 37.72 kg/m².           Drains       Drain  Duration                  Ureteral Drain/Stent 06/10/24 0801 Left ureter 6 Fr. 1 day                     Physical Exam  Constitutional:       General: She is not in acute distress.     Appearance: Normal appearance.   HENT:      Head: Normocephalic.   Eyes:      Pupils: Pupils are equal, round, and reactive to light.   Cardiovascular:      Rate and Rhythm: Normal rate.   Pulmonary:      Effort: Pulmonary effort is normal. No respiratory distress.   Chest:      Chest wall: No tenderness.   Abdominal:      General: Abdomen is flat. There is no distension.      Palpations: Abdomen is soft.      Tenderness: There is no abdominal tenderness. There is left CVA tenderness. There is no right CVA tenderness.   Musculoskeletal:          General: Normal range of motion.      Cervical back: Normal range of motion.   Neurological:      General: No focal deficit present.      Mental Status: She is alert and oriented to person, place, and time.   Psychiatric:         Mood and Affect: Mood normal.         Behavior: Behavior normal.           Significant Labs:    BMP:  Recent Labs   Lab 06/10/24  0448 06/11/24  0530 06/12/24  0502    140 141   K 3.7 3.8 4.5    105 105   CO2 25 25 28   BUN 16 14 18   CREATININE 0.8 0.8 0.8   CALCIUM 9.2 9.6 9.0       CBC:   Recent Labs   Lab 06/10/24  0448 06/11/24  0530 06/12/24  0502   WBC 6.79 9.05 8.07   HGB 12.0 12.9 12.0   HCT 36.7* 38.1 36.7*    229 200       All pertinent labs results from the past 24 hours have been reviewed.    Significant Imaging:  All pertinent imaging results/findings from the past 24 hours have been reviewed.                  Assessment/Plan:     * Nephrolithiasis  -L ureteral stent placed 6/10/24  -Urine culture no growth.  -Discussed that stent is not permanent and she will need outpatient Urologic f/u to discuss stone management. Will need outpatient ureteroscopy  -Discussed it is normal to see blood in the urine following stent placement  -On discharge, please order pyridium 200 mg TID PRN dysuria for 3 days #6, oxybutynin 5 mg TID PRN stent pain for 5 days #15, toradol 10 mg q6h PRN pain, a few rescue tablets of oxycodone 5 mg for breakthrough.   -Please DC on po ABX through her procedure 6/24  -Okay for discharge from a urologic perspective          VTE Risk Mitigation (From admission, onward)           Ordered     heparin (porcine) injection 5,000 Units  Every 8 hours         06/09/24 2302     IP VTE HIGH RISK PATIENT  Once         06/09/24 2302     Place sequential compression device  Until discontinued         06/09/24 2302                    Samir Escamilla MD  Urology  [facility] Surg

## 2024-06-12 NOTE — PLAN OF CARE
Problem: Adult Inpatient Plan of Care  Goal: Plan of Care Review  Outcome: Progressing     Vital signs, labs, progress notes and care plan reviewed.  Will be available to intervene if needed.

## 2024-06-13 NOTE — ASSESSMENT & PLAN NOTE
Body mass index is 37.72 kg/m². Morbid obesity complicates all aspects of disease management from diagnostic modalities to treatment. Weight loss encouraged and health benefits explained to patient.

## 2024-06-13 NOTE — DISCHARGE SUMMARY
[facility] Medicine  Discharge Summary      Patient Name: Omkar Gan  MRN: [id number]  [facility]: [id number]  Patient Class: IP- Inpatient  Admission Date: 6/9/2024  Hospital Length of Stay: 2 days  Discharge Date and Time: 6/12/2024  3:06 PM  Attending Physician: KURT LAWRENCE   Discharging Provider: Mckay Moran PA-C  Primary Care Provider: Funmilayo Kumar A.M., MD    Primary Care Team: Networked reference to record PCT     HPI:   Omkar Gan is a 49-year-old female with a past medical history of renal stones, hypertension, allergies presents with left-sided flank pain over the past week.    Patient states that she has had left-sided flank pain associated nausea over the past couple of days.  She reports that it feels like her prior episodes of kidney stones.  She is taken multiple over-the-counter medications with minimal relief.  She denies any gross hematuria, dysuria, abdominal pain.    Triage vitals significant for elevated blood pressures.  Review of systems significant for nausea and flank pain.  Physical exam significant for left CVA tenderness.    CBC and CMP are nearly completely unremarkable.  UA with WBCs and leukocytes but no bacteria.  Urine culture pending.    CT renal shows severe hydronephrosis in the left and obstructing 9 mm calculus in the mid left ureter again.  Nonobstructing calculi noted in the right.    Urology consulted and plans for intervention in the a.m..  Patient placed NPO.    Patient admitted for nephrolithiasis and hydronephrosis.    Procedure(s) (LRB):  CYSTOSCOPY, left retrograde pyelogram, left JJ stent (Left)  CYSTOURETEROSCOPY,WITH HOLMIUM LASER LITHOTRIPSY OF URETERAL CALCULUS (Left)  INSERTION, STENT, URETER (Left)      Hospital Course:   The patient was admitted to the Hospital Medicine service for further evaluation and management of her nephrolithiasis and hydronephrosis. Urology was consulted, CBC and BMP were trended, her urine was cultured,  and she was continued NPO. Ceftriaxone 1g IV qd was initiated for her UTI, her bladder spasms were treated with oxybutynin 5mg PO prn, her nausea was treated with ondansetron 4mg PO, her pain was controlled with combination PO and IV medicines, and she was initiated on continuous NS IV fluids. She underwent cystoscopy w/ left JJ ureteral stent placement without complication. She was transitioned to ciprofloxacin 750mg PO BID. She recovered from the procedure, tolerated a diet, her BARBARA resolved, and IV fluids were discontinued. Her urine cultures showed no bacterial growth. With her condition improved, she was discharged to home in stable condition w/ a 6/24/2024 Urology appointment for definitive stone management. At discharge she received tamsulosin 0.4mg PO qd, pyridium 200 mg TID PRN for dysuria for 3 days, oxybutynin 5 mg TID PRN for stent pain for 5 days, toradol 10 mg q6h PRN for pain, a few rescue tablets of oxycodone 5 mg for breakthrough pain, and ciprofloxacin 500mg PO BID through her scheduled procedure.     Goals of Care Treatment Preferences:  Code Status: Full Code      Consults:   Consults (From admission, onward)          Status Ordering Provider     Inpatient consult to Urology  Once        Provider:  (Not yet assigned)    Completed OVIDIO JEFFERSON            Renal/  * Nephrolithiasis  Patient was significant left flank pain.  CT imaging concerning for obstructing calculi in the mid left ureter with resultant hydronephrosis. Urology consulted, appreciate rec's    Plan:   S/p Cystoscopy w/ left JJ ureteral stent placement w/o complications.   Trending CBC/BMP, BARBARA resolved and Cr back to baseline.   Urine cultures show no growth.   Discontinued Continuous NS 100mL/hr.  Voiding well  Transition to ciprofloxacin 750mg PO BID   Flomax scheduled.  Continue with pain control    She will need oxybutynin, flomax, and pyridium on discharge.   She will f/u for left ureteroscopy for definitive stone  management 6/24/2024, ciprofloxacin 500mg PO BID through procedure      Hydronephrosis-resolved as of 6/12/2024  Hydronephrosis noted in the left ureter secondary to stone    Plan:  S/p Cystoscopy w/ left JJ ureteral stent placement w/o complications  Daily BMP, CMP, BARBARA resolved      Endocrine  Morbid obesity with body mass index (BMI) of 40.0 to 44.9 in adult  Body mass index is 37.72 kg/m². Morbid obesity complicates all aspects of disease management from diagnostic modalities to treatment. Weight loss encouraged and health benefits explained to patient.           Final Active Diagnoses:    Diagnosis Date Noted POA    PRINCIPAL PROBLEM:  Nephrolithiasis [N20.0] 06/09/2024 Yes     Chronic    Morbid obesity with body mass index (BMI) of 40.0 to 44.9 in adult [E66.01, Z68.41] 09/05/2023 Not Applicable      Problems Resolved During this Admission:    Diagnosis Date Noted Date Resolved POA    Hydronephrosis [N13.30] 06/09/2024 06/12/2024 Yes       Discharged Condition: stable    Disposition: Home or Self Care    Follow Up:   Follow-up Information       Funmilayo Kumar A.M., MD. Go on 6/19/2024.    Specialty: Family Medicine  Why: 11:20  Contact information:  [address] [facility]  [place] [address]  [phone]               Pasha Jeffries MD Follow up on 6/24/2024.    Specialty: Urology  Why: YOU ARE SCHEDULED FOR A CYSTOSCOPY ON 6/24 AT [facility]. THE UROLOGIST OFFICE WILL CALL YOU WITH INSTRUCTIONS 2 DAYS BEFORE THE PROCEDURE WITH TIME TO ARRIVE. (If you don't hear from them, please call the office)  Contact information:  [address]  [address]  [place] [address]  [phone]                           Patient Instructions:      Diet Adult Regular     Activity as tolerated       Significant Diagnostic Studies: N/A    Pending Diagnostic Studies:       None           Medications:  Reconciled Home Medications:      Medication List        START taking these medications      ciprofloxacin HCl 500  MG tablet  Commonly known as: CIPRO  Take 1 tablet (500 mg total) by mouth every 12 (twelve) hours. for 12 days     ketorolac 10 mg tablet  Commonly known as: TORADOL  Take 1 tablet (10 mg total) by mouth every 6 (six) hours as needed for Pain.     oxybutynin 5 MG Tab  Commonly known as: DITROPAN  Take 1 tablet (5 mg total) by mouth 3 (three) times daily as needed (stent pain).     oxyCODONE 5 MG immediate release tablet  Commonly known as: ROXICODONE  Take 1 tablet (5 mg total) by mouth daily as needed for Pain (Breakthrough pain).     phenazopyridine 200 MG tablet  Commonly known as: PYRIDIUM  Take 1 tablet (200 mg total) by mouth 3 (three) times daily as needed (dsyuria).     tamsulosin 0.4 mg Cap  Commonly known as: FLOMAX  Take 1 capsule (0.4 mg total) by mouth once daily.              Indwelling Lines/Drains at time of discharge:   Lines/Drains/Airways       None                   Time spent on the discharge of patient: 20 minutes         Mckay Moran PA-C  Department of Hospital Medicine  [facility] Surg

## 2024-06-13 NOTE — HOSPITAL COURSE
The patient was admitted to the Hospital Medicine service for further evaluation and management of her nephrolithiasis and hydronephrosis. Urology was consulted, CBC and BMP were trended, her urine was cultured, and she was continued NPO. Ceftriaxone 1g IV qd was initiated for her UTI, her bladder spasms were treated with oxybutynin 5mg PO prn, her nausea was treated with ondansetron 4mg PO, her pain was controlled with combination PO and IV medicines, and she was initiated on continuous NS IV fluids. She underwent cystoscopy w/ left JJ ureteral stent placement without complication. She was transitioned to ciprofloxacin 750mg PO BID. She recovered from the procedure, tolerated a diet, her BARBARA resolved, and IV fluids were discontinued. Her urine cultures showed no bacterial growth. With her condition improved, she was discharged to home in stable condition w/ a 6/24/2024 Urology appointment for definitive stone management. At discharge she received tamsulosin 0.4mg PO qd, pyridium 200 mg TID PRN for dysuria for 3 days, oxybutynin 5 mg TID PRN for stent pain for 5 days, toradol 10 mg q6h PRN for pain, a few rescue tablets of oxycodone 5 mg for breakthrough pain, and ciprofloxacin 500mg PO BID through her scheduled procedure.

## 2024-06-13 NOTE — ASSESSMENT & PLAN NOTE
Patient was significant left flank pain.  CT imaging concerning for obstructing calculi in the mid left ureter with resultant hydronephrosis. Urology consulted, appreciate rec's    Plan:   S/p Cystoscopy w/ left JJ ureteral stent placement w/o complications.   Trending CBC/BMP, BARBARA resolved and Cr back to baseline.   Urine cultures show no growth.   Discontinued Continuous NS 100mL/hr.  Voiding well  Transition to ciprofloxacin 750mg PO BID   Flomax scheduled.  Continue with pain control    She will need oxybutynin, flomax, and pyridium on discharge.   She will f/u for left ureteroscopy for definitive stone management 6/24/2024, ciprofloxacin 500mg PO BID through procedure     show

## 2024-06-13 NOTE — ASSESSMENT & PLAN NOTE
Hydronephrosis noted in the left ureter secondary to stone    Plan:  S/p Cystoscopy w/ left JJ ureteral stent placement w/o complications  Daily BMP, CMP, BARBARA resolved

## 2024-06-17 ENCOUNTER — PATIENT OUTREACH (OUTPATIENT)
Dept: ADMINISTRATIVE | Facility: CLINIC | Age: 49
End: 2024-06-17
Payer: COMMERCIAL

## 2024-06-17 NOTE — PROGRESS NOTES
C3 nurse spoke with Omkar Gna for a TCC post hospital discharge follow up call. The patient has a scheduled HOSFU appointment with Funmilayo Kumar A.M., MD on 06/19/24 @ 6950.

## 2024-06-18 ENCOUNTER — PATIENT OUTREACH (OUTPATIENT)
Dept: ADMINISTRATIVE | Facility: OTHER | Age: 49
End: 2024-06-18
Payer: COMMERCIAL

## 2024-06-19 ENCOUNTER — HOSPITAL ENCOUNTER (EMERGENCY)
Facility: HOSPITAL | Age: 49
Discharge: HOME | End: 2024-06-19
Attending: EMERGENCY MEDICINE
Payer: COMMERCIAL

## 2024-06-19 VITALS
DIASTOLIC BLOOD PRESSURE: 83 MMHG | SYSTOLIC BLOOD PRESSURE: 167 MMHG | BODY MASS INDEX: 35.36 KG/M2 | HEART RATE: 65 BPM | WEIGHT: 220 LBS | OXYGEN SATURATION: 100 % | RESPIRATION RATE: 18 BRPM | TEMPERATURE: 98 F | HEIGHT: 66 IN

## 2024-06-19 DIAGNOSIS — N23 RENAL COLIC ON LEFT SIDE: Primary | ICD-10-CM

## 2024-06-19 LAB
ALBUMIN SERPL BCP-MCNC: 3.9 G/DL (ref 3.5–5.2)
ALP SERPL-CCNC: 70 U/L (ref 55–135)
ALT SERPL W/O P-5'-P-CCNC: 25 U/L (ref 10–44)
ANION GAP SERPL CALC-SCNC: 10 MMOL/L (ref 8–16)
AST SERPL-CCNC: 20 U/L (ref 10–40)
BACTERIA #/AREA URNS HPF: ABNORMAL /HPF
BASOPHILS # BLD AUTO: 0.05 K/UL (ref 0–0.2)
BASOPHILS NFR BLD: 0.7 % (ref 0–1.9)
BILIRUB SERPL-MCNC: 0.5 MG/DL (ref 0.1–1)
BILIRUB UR QL STRIP: NEGATIVE
BUN SERPL-MCNC: 16 MG/DL (ref 6–20)
CALCIUM SERPL-MCNC: 10 MG/DL (ref 8.7–10.5)
CHLORIDE SERPL-SCNC: 105 MMOL/L (ref 95–110)
CLARITY UR: ABNORMAL
CO2 SERPL-SCNC: 24 MMOL/L (ref 23–29)
COLOR UR: YELLOW
CREAT SERPL-MCNC: 0.9 MG/DL (ref 0.5–1.4)
DIFFERENTIAL METHOD BLD: NORMAL
EOSINOPHIL # BLD AUTO: 0.3 K/UL (ref 0–0.5)
EOSINOPHIL NFR BLD: 4.1 % (ref 0–8)
ERYTHROCYTE [DISTWIDTH] IN BLOOD BY AUTOMATED COUNT: 13.2 % (ref 11.5–14.5)
EST. GFR  (NO RACE VARIABLE): >60 ML/MIN/1.73 M^2
GLUCOSE SERPL-MCNC: 92 MG/DL (ref 70–110)
GLUCOSE UR QL STRIP: NEGATIVE
HCT VFR BLD AUTO: 38.9 % (ref 37–48.5)
HGB BLD-MCNC: 13 G/DL (ref 12–16)
HGB UR QL STRIP: ABNORMAL
HYALINE CASTS #/AREA URNS LPF: 0 /LPF
IMM GRANULOCYTES # BLD AUTO: 0.01 K/UL (ref 0–0.04)
IMM GRANULOCYTES NFR BLD AUTO: 0.1 % (ref 0–0.5)
KETONES UR QL STRIP: NEGATIVE
LEUKOCYTE ESTERASE UR QL STRIP: ABNORMAL
LYMPHOCYTES # BLD AUTO: 1.6 K/UL (ref 1–4.8)
LYMPHOCYTES NFR BLD: 22 % (ref 18–48)
MCH RBC QN AUTO: 31 PG (ref 27–31)
MCHC RBC AUTO-ENTMCNC: 33.4 G/DL (ref 32–36)
MCV RBC AUTO: 93 FL (ref 82–98)
MICROSCOPIC COMMENT: ABNORMAL
MONOCYTES # BLD AUTO: 0.6 K/UL (ref 0.3–1)
MONOCYTES NFR BLD: 7.7 % (ref 4–15)
NEUTROPHILS # BLD AUTO: 4.7 K/UL (ref 1.8–7.7)
NEUTROPHILS NFR BLD: 65.4 % (ref 38–73)
NITRITE UR QL STRIP: NEGATIVE
NRBC BLD-RTO: 0 /100 WBC
PH UR STRIP: 6 [PH] (ref 5–8)
PLATELET # BLD AUTO: 262 K/UL (ref 150–450)
PMV BLD AUTO: 10.1 FL (ref 9.2–12.9)
POTASSIUM SERPL-SCNC: 3.9 MMOL/L (ref 3.5–5.1)
PROT SERPL-MCNC: 7.7 G/DL (ref 6–8.4)
PROT UR QL STRIP: ABNORMAL
RBC # BLD AUTO: 4.19 M/UL (ref 4–5.4)
RBC #/AREA URNS HPF: >100 /HPF (ref 0–4)
SODIUM SERPL-SCNC: 139 MMOL/L (ref 136–145)
SP GR UR STRIP: 1.02 (ref 1–1.03)
SQUAMOUS #/AREA URNS HPF: 1 /HPF
UNIDENT CRYS URNS QL MICRO: 34
URN SPEC COLLECT METH UR: ABNORMAL
UROBILINOGEN UR STRIP-ACNC: NEGATIVE EU/DL
WBC # BLD AUTO: 7.14 K/UL (ref 3.9–12.7)
WBC #/AREA URNS HPF: >100 /HPF (ref 0–5)

## 2024-06-19 PROCEDURE — 85025 COMPLETE CBC W/AUTO DIFF WBC: CPT | Performed by: NURSE PRACTITIONER

## 2024-06-19 PROCEDURE — 96375 TX/PRO/DX INJ NEW DRUG ADDON: CPT

## 2024-06-19 PROCEDURE — 80053 COMPREHEN METABOLIC PANEL: CPT | Performed by: NURSE PRACTITIONER

## 2024-06-19 PROCEDURE — 87088 URINE BACTERIA CULTURE: CPT | Performed by: NURSE PRACTITIONER

## 2024-06-19 PROCEDURE — 96374 THER/PROPH/DIAG INJ IV PUSH: CPT

## 2024-06-19 PROCEDURE — 81000 URINALYSIS NONAUTO W/SCOPE: CPT | Performed by: NURSE PRACTITIONER

## 2024-06-19 PROCEDURE — 99285 EMERGENCY DEPT VISIT HI MDM: CPT | Mod: 25

## 2024-06-19 PROCEDURE — 96376 TX/PRO/DX INJ SAME DRUG ADON: CPT

## 2024-06-19 PROCEDURE — 63600175 PHARM REV CODE 636 W HCPCS: Performed by: EMERGENCY MEDICINE

## 2024-06-19 PROCEDURE — 87086 URINE CULTURE/COLONY COUNT: CPT | Performed by: NURSE PRACTITIONER

## 2024-06-19 RX ORDER — OXYBUTYNIN CHLORIDE 15 MG/1
15 TABLET, EXTENDED RELEASE ORAL DAILY
Qty: 20 TABLET | Refills: 0 | Status: SHIPPED | OUTPATIENT
Start: 2024-06-19 | End: 2024-07-09

## 2024-06-19 RX ORDER — OXYCODONE HYDROCHLORIDE 5 MG/1
5 TABLET ORAL DAILY PRN
Qty: 20 TABLET | Refills: 0 | Status: SHIPPED | OUTPATIENT
Start: 2024-06-19

## 2024-06-19 RX ORDER — HYDROCODONE BITARTRATE AND ACETAMINOPHEN 5; 325 MG/1; MG/1
TABLET ORAL
COMMUNITY
End: 2024-06-19

## 2024-06-19 RX ORDER — ONDANSETRON HYDROCHLORIDE 2 MG/ML
4 INJECTION, SOLUTION INTRAVENOUS
Status: COMPLETED | OUTPATIENT
Start: 2024-06-19 | End: 2024-06-19

## 2024-06-19 RX ORDER — IBUPROFEN 200 MG
200 TABLET ORAL EVERY 6 HOURS PRN
COMMUNITY

## 2024-06-19 RX ORDER — MORPHINE SULFATE 4 MG/ML
3 INJECTION, SOLUTION INTRAMUSCULAR; INTRAVENOUS
Status: COMPLETED | OUTPATIENT
Start: 2024-06-19 | End: 2024-06-19

## 2024-06-19 RX ORDER — KETOROLAC TROMETHAMINE 30 MG/ML
15 INJECTION, SOLUTION INTRAMUSCULAR; INTRAVENOUS
Status: COMPLETED | OUTPATIENT
Start: 2024-06-19 | End: 2024-06-19

## 2024-06-19 RX ORDER — SULFAMETHOXAZOLE AND TRIMETHOPRIM 800; 160 MG/1; MG/1
TABLET ORAL
COMMUNITY
End: 2024-06-19

## 2024-06-19 RX ADMIN — KETOROLAC TROMETHAMINE 15 MG: 30 INJECTION, SOLUTION INTRAMUSCULAR; INTRAVENOUS at 04:06

## 2024-06-19 RX ADMIN — MORPHINE SULFATE 3 MG: 4 INJECTION INTRAVENOUS at 03:06

## 2024-06-19 RX ADMIN — MORPHINE SULFATE 3 MG: 4 INJECTION INTRAVENOUS at 12:06

## 2024-06-19 RX ADMIN — ONDANSETRON 4 MG: 2 INJECTION INTRAMUSCULAR; INTRAVENOUS at 12:06

## 2024-06-19 NOTE — ED PROVIDER NOTES
Encounter Date: 6/19/2024       History     Chief Complaint   Patient presents with    left flank pain     Recently hospitalized for kidney stones w/ stent placement. N&V w/ left flank pain, states decreased voiding frequency.      The patient is a 49-year-old female who came to the emergency department with left-sided flank pain.  She states she had a procedure last week for a 9 mm stone.  She had a stent placed but she states they were unable to remove the stone.  She is scheduled for repeat surgery on Monday.  She states that for the past 2-3 days, her pain has become worse and is constant.  She is currently on Toradol, oxycodone, Pyridium, Flomax, Cipro, Ditropan.  Upon reviewing the previous surgical record, the stone was imbedded within the ureter.  Laser was used to bore a hole through the 9 mm stone in order to place the stent through the middle of the stone.       Review of patient's allergies indicates:   Allergen Reactions    Penicillins Nausea And Vomiting     + dizziness     Past Medical History:   Diagnosis Date    Allergy     Graves disease     Graves disease     History of kidney stones     History of uterine fibroid     Hypertension     Kidney stone      Past Surgical History:   Procedure Laterality Date    CYSTOURETEROSCOPY,WITH HOLMIUM LASER LITHOTRIPSY OF URETERAL CALCULUS Left 6/10/2024    Procedure: CYSTOURETEROSCOPY,WITH HOLMIUM LASER LITHOTRIPSY OF URETERAL CALCULUS;  Surgeon: Pasha Jeffries MD;  Location: [facility] OR;  Service: Urology;  Laterality: Left;    FACIAL RECONSTRUCTION SURGERY      FACIAL RECONSTRUCTION SURGERY      FACIAL RECONSTRUCTION SURGERY      HYSTERECTOMY      RETROGRADE PYELOGRAPHY Left 6/10/2024    Procedure: CYSTOSCOPY, left retrograde pyelogram, left JJ stent;  Surgeon: Pasha Jeffries MD;  Location: [facility] OR;  Service: Urology;  Laterality: Left;  MAC vs choice    URETERAL STENT PLACEMENT Left 6/10/2024    Procedure: INSERTION, STENT, URETER;  Surgeon: Mervin  Pasha HERNÁNDEZ MD;  Location: [facility] OR;  Service: Urology;  Laterality: Left;     Family History   Problem Relation Name Age of Onset    Hypertension Maternal Grandmother      Pacemaker/defibrilator Maternal Grandmother       Social History     Tobacco Use    Smoking status: Never    Smokeless tobacco: Never   Substance Use Topics    Alcohol use: Yes     Alcohol/week: 7.0 standard drinks of alcohol     Types: 7 Glasses of wine per week     Comment: states having a drink or two every day    Drug use: No     Review of Systems   All other systems reviewed and are negative.      Physical Exam     Initial Vitals [06/19/24 1040]   BP Pulse Resp Temp SpO2   (!) 191/84 (!) 58 20 98 °F (36.7 °C) 100 %      MAP       --         Physical Exam    Nursing note and vitals reviewed.  Constitutional: She appears well-developed and well-nourished.   HENT:   Head: Normocephalic and atraumatic.   Neck: Neck supple.   Normal range of motion.  Pulmonary/Chest: Breath sounds normal.   Abdominal: Abdomen is soft.   Musculoskeletal:         General: Tenderness (Left CVA tenderness) present.      Cervical back: Normal range of motion and neck supple.     Neurological: She is alert and oriented to person, place, and time.   Skin: Skin is warm and dry.   Psychiatric: She has a normal mood and affect. Her behavior is normal. Judgment and thought content normal.         ED Course   Procedures  Labs Reviewed   URINALYSIS, REFLEX TO URINE CULTURE - Abnormal; Notable for the following components:       Result Value    Appearance, UA Hazy (*)     Protein, UA 1+ (*)     Occult Blood UA 3+ (*)     Leukocytes, UA 3+ (*)     All other components within normal limits    Narrative:     Specimen Source->Urine   URINALYSIS MICROSCOPIC - Abnormal; Notable for the following components:    RBC, UA >100 (*)     WBC, UA >100 (*)     All other components within normal limits    Narrative:     Specimen Source->Urine   CULTURE, URINE   CBC W/ AUTO DIFFERENTIAL    COMPREHENSIVE METABOLIC PANEL          Imaging Results              CT Renal Stone Study ABD Pelvis WO (Final result)  Result time 06/19/24 13:25:38      Final result by Nura Carrion III, MD (06/19/24 13:25:38)                   Impression:      Left ureter calculus retrieved.  There is a left double-J ureteric stent in good position.  There is left-sided hydronephrosis.    Bilateral intrarenal calculi.    Left adrenal adenoma.      Electronically signed by: Nura Carrion MD  Date:    06/19/2024  Time:    13:25               Narrative:    EXAMINATION:  CT RENAL STONE STUDY ABD PELVIS WO    CLINICAL HISTORY:  Nephrolithiasis, symptomatic/complicated;    FINDINGS:  Comparison is 06/09/2024.    Lung bases are clear.  Liver, gallbladder, biliary tree, spleen, stomach, pancreas, duodenum, are unremarkable.  There is a left adrenal adenoma.  There are 2 small nonobstructing right intrarenal calculi.  There is left-sided hydroureteronephrosis with small left lower pole intrarenal calculus.  A small amount of postoperative gas within the left calices.  There is a left double-J ureteric stent.  The left ureter calculus has been retrieved.  Right kidney demonstrates no hydronephrosis.  Left kidney demonstrates hydronephrosis.  The stent is in good position.  No bladder calculi are seen.  No para-aortic, retroperitoneal, or mesenteric adenopathy is seen.  No ascites is seen.  No obstruction, ileus, or perforation seen.  Pelvic organs, and bowel loops show nothing unusual.  Appendix region is normal.  Bones reveal DJD.                                       Medications   morphine injection 3 mg (3 mg Intravenous Given 6/19/24 1257)   ondansetron injection 4 mg (4 mg Intravenous Given 6/19/24 1256)   morphine injection 3 mg (3 mg Intravenous Given 6/19/24 1506)   ketorolac injection 15 mg (15 mg Intravenous Given 6/19/24 1603)     Medical Decision Making  Differential Diagnosis includes, but is not limited to:  AAA,  aortic dissection, SBO/volvulus, intussusception, ileus, appendicitis, cholecystitis, hepatitis, nephrolithiasis, pancreatitis, IBD/IBS, biliary colic, GERD, PUD, constipation, UTI/pyelonephritis, musculoskeletal pain.      MDM: The patient is a 49-year-old female who has left-sided flank pain since prior to her procedure last week.  The patient has a 9 mm stone that is unable to be removed.  The patient is on multiple medications and has a stent in place right now with ongoing hydronephrosis.  The case was discussed with Dr. Anand.  He states that this pain could absolutely be expected.  He recommends changing the Ditropan to 15 mg extended release.  I will also write her more oxycodone as her last prescription for oxycodone was for 3 tablets.  The urinalysis has greater than 100 white cells and red cells but this would be expected with the ureteral stent.  The patient will be discharged home and she should keep her appointment on Monday.    Amount and/or Complexity of Data Reviewed  Labs: ordered. Decision-making details documented in ED Course.  Radiology: ordered. Decision-making details documented in ED Course.    Risk  Prescription drug management.                                      Clinical Impression:  Final diagnoses:  [N23] Renal colic on left side (Primary)          ED Disposition Condition    Discharge Stable          ED Prescriptions       Medication Sig Dispense Start Date End Date Auth. Provider    oxybutynin (DITROPAN XL) 15 MG TR24 Take 1 tablet (15 mg total) by mouth once daily. for 20 days 20 tablet 6/19/2024 7/9/2024 Stacey Loera MD    oxyCODONE (ROXICODONE) 5 MG immediate release tablet Take 1 tablet (5 mg total) by mouth daily as needed for Pain (Breakthrough pain). 20 tablet 6/19/2024 -- Stacey Loera MD          Follow-up Information       Follow up With Specialties Details Why Contact Info    Pasha Jeffries MD Urology   [address]  [address]  [place]  [address]  [phone]               Stacey Loera MD  06/19/24 2236

## 2024-06-19 NOTE — FIRST PROVIDER EVALUATION
Emergency Department TeleTriage Encounter Note      CHIEF COMPLAINT    Chief Complaint   Patient presents with    left flank pain     Recently hospitalized for kidney stones w/ stent placement. N&V w/ left flank pain, states decreased voiding frequency.        VITAL SIGNS   Initial Vitals [06/19/24 1040]   BP Pulse Resp Temp SpO2   (!) 191/84 (!) 58 20 98 °F (36.7 °C) 100 %      MAP       --            ALLERGIES    Review of patient's allergies indicates:   Allergen Reactions    Penicillins Nausea And Vomiting     + dizziness       PROVIDER TRIAGE NOTE  Verbal consent for the teletriage evaluation was given by the patient at the start of the evaluation.  All efforts will be made to maintain patient's privacy during the evaluation.      This is a teletriage evaluation of a 49 y.o. female presenting to the ED with c/o left flank pain with N/V.  Recently discharge with kidney stones and stent placement.  No Urologist. Limited physical exam via telehealth: The patient is awake, alert, answering questions appropriately and is not in respiratory distress.  As the Teletriage provider, I performed an initial assessment and ordered appropriate labs and imaging studies, if any, to facilitate the patient's care once placed in the ED. Once a room is available, care and a full evaluation will be completed by an alternate ED provider.  Any additional orders and the final disposition will be determined by that provider.  All imaging and labs will not be followed-up by the Teletriage Team, including myself.          ORDERS  Labs Reviewed - No data to display    ED Orders (720h ago, onward)      Start Ordered     Status Ordering Provider    06/19/24 1155 06/19/24 1154  Saline lock IV  Once         Ordered SARAH AVILES    06/19/24 1155 06/19/24 1154  CBC auto differential  STAT         Ordered SARAH AVILES    06/19/24 1155 06/19/24 1154  Comprehensive metabolic panel  STAT         Ordered SARAH AVILES    06/19/24 1155 06/19/24  1154  Urinalysis, Reflex to Urine Culture Urine, Clean Catch  STAT         Ordered SARAH AVILES A              Virtual Visit Note: The provider triage portion of this emergency department evaluation and documentation was performed via [employer], a HIPAA-compliant telemedicine application, in concert with a tele-presenter in the room. A face to face patient evaluation with one of my colleagues will occur once the patient is placed in an emergency department room.      DISCLAIMER: This note was prepared with [employer] voice recognition transcription software. Garbled syntax, mangled pronouns, and other bizarre constructions may be attributed to that software system.

## 2024-06-19 NOTE — PHARMACY MED REC
"Admission Medication History     The home medication history was taken by Jenny Ferro CPhT.    Medication history obtained from, Patient Verified    You may go to "Admission" then "Reconcile Home Medications" tabs to review and/or act upon these items.     The home medication list has been updated by the Pharmacy department.   Please read ALL comments highlighted in yellow.   Please address this information as you see fit.    Feel free to contact us if you have any questions or require assistance.      The medications listed below were removed from the home medication list.  Please reorder if appropriate:  Patient reports no longer taking the following medication(s):  Bactrim -160 mg  Norco 5-325 mg      Jenny Ferro CPhT.  Ext [phone]               .          "

## 2024-06-20 LAB — BACTERIA UR CULT: ABNORMAL

## 2024-06-21 ENCOUNTER — TELEPHONE (OUTPATIENT)
Dept: UROLOGY | Facility: CLINIC | Age: 49
End: 2024-06-21
Payer: COMMERCIAL

## 2024-06-21 NOTE — TELEPHONE ENCOUNTER
----- Message from Caitlin Esquivel sent at 6/21/2024  9:53 AM CDT -----  Type:  Needs Medical Advice    Who Called:  Omkar Gan    Would the patient rather a call back or a response via [facility]?  call  Best Call Back Number:   [phone]  Additional Information:  Pt has a procedure scheduled for 6/24/24 and wants to verify the time as soon as possible.  Pt needs to prepare transportation ahead of time.

## 2024-06-21 NOTE — PROGRESS NOTES
CHW - Outreach Attempt    Community Health Worker left a In Basket message for 1st attempt to contact patient regarding: SDOH  Community Health Worker to attempt to contact patient on: 7/8  CHW - Initial Contact    This Community Health Worker completed verified updated the Social Determinant of Health questionnaire with patient via telephone today.    Pt identified barriers of most importance are: Food Assistance (SNAP)   Referrals to community agencies completed with patient/caregiver consent outside of [facility] include:  DCFS- SNAP Application  Referrals were put through [facility] - no: none  Support and Services: CHW scheduled an in-person visit to assist pt with the completion of a SNAP Application  Other information discussed the patient needs / wants help with: Pt stated that she is in need of assistance with the completion of a SNAP Application and opted to complete the application in-person.    Follow up required: Yes, In-Person Pt Visit 3 pm  Follow-up Outreach - Due: 6/28/2024

## 2024-06-24 ENCOUNTER — HOSPITAL ENCOUNTER (OUTPATIENT)
Facility: HOSPITAL | Age: 49
Discharge: HOME | End: 2024-06-24
Attending: UROLOGY | Admitting: UROLOGY
Payer: COMMERCIAL

## 2024-06-24 ENCOUNTER — ANESTHESIA (OUTPATIENT)
Dept: SURGERY | Facility: HOSPITAL | Age: 49
End: 2024-06-24
Payer: COMMERCIAL

## 2024-06-24 ENCOUNTER — ANESTHESIA EVENT (OUTPATIENT)
Dept: SURGERY | Facility: HOSPITAL | Age: 49
End: 2024-06-24
Payer: COMMERCIAL

## 2024-06-24 VITALS
SYSTOLIC BLOOD PRESSURE: 140 MMHG | BODY MASS INDEX: 35.36 KG/M2 | HEART RATE: 56 BPM | WEIGHT: 220 LBS | TEMPERATURE: 98 F | DIASTOLIC BLOOD PRESSURE: 83 MMHG | RESPIRATION RATE: 18 BRPM | OXYGEN SATURATION: 98 % | HEIGHT: 66 IN

## 2024-06-24 DIAGNOSIS — N20.1 URETERAL STONE: Primary | ICD-10-CM

## 2024-06-24 DIAGNOSIS — N20.0 NEPHROLITHIASIS: Chronic | ICD-10-CM

## 2024-06-24 PROCEDURE — 71000015 HC POSTOP RECOV 1ST HR: Performed by: UROLOGY

## 2024-06-24 PROCEDURE — 71000033 HC RECOVERY, INTIAL HOUR: Performed by: UROLOGY

## 2024-06-24 PROCEDURE — 36000706: Performed by: UROLOGY

## 2024-06-24 PROCEDURE — 71000016 HC POSTOP RECOV ADDL HR: Performed by: UROLOGY

## 2024-06-24 PROCEDURE — 25000003 PHARM REV CODE 250: Performed by: ANESTHESIOLOGY

## 2024-06-24 PROCEDURE — C1773 RET DEV, INSERTABLE: HCPCS | Performed by: UROLOGY

## 2024-06-24 PROCEDURE — 37000008 HC ANESTHESIA 1ST 15 MINUTES: Performed by: UROLOGY

## 2024-06-24 PROCEDURE — 52356 CYSTO/URETERO W/LITHOTRIPSY: CPT | Mod: LT,,, | Performed by: UROLOGY

## 2024-06-24 PROCEDURE — 27201423 OPTIME MED/SURG SUP & DEVICES STERILE SUPPLY: Performed by: UROLOGY

## 2024-06-24 PROCEDURE — 74420 UROGRAPHY RTRGR +-KUB: CPT | Mod: 26,,, | Performed by: UROLOGY

## 2024-06-24 PROCEDURE — 36000707: Performed by: UROLOGY

## 2024-06-24 PROCEDURE — 25500020 PHARM REV CODE 255: Performed by: UROLOGY

## 2024-06-24 PROCEDURE — 82365 CALCULUS SPECTROSCOPY: CPT | Performed by: UROLOGY

## 2024-06-24 PROCEDURE — 63600175 PHARM REV CODE 636 W HCPCS: Performed by: STUDENT IN AN ORGANIZED HEALTH CARE EDUCATION/TRAINING PROGRAM

## 2024-06-24 PROCEDURE — C1769 GUIDE WIRE: HCPCS | Performed by: UROLOGY

## 2024-06-24 PROCEDURE — 37000009 HC ANESTHESIA EA ADD 15 MINS: Performed by: UROLOGY

## 2024-06-24 PROCEDURE — 63600175 PHARM REV CODE 636 W HCPCS: Performed by: NURSE ANESTHETIST, CERTIFIED REGISTERED

## 2024-06-24 PROCEDURE — C2617 STENT, NON-COR, TEM W/O DEL: HCPCS | Performed by: UROLOGY

## 2024-06-24 PROCEDURE — 71000039 HC RECOVERY, EACH ADD'L HOUR: Performed by: UROLOGY

## 2024-06-24 PROCEDURE — C1747 OPTIME ENDOSCOPE, SINGLE, URINARY TRACT: HCPCS | Performed by: UROLOGY

## 2024-06-24 PROCEDURE — C1758 CATHETER, URETERAL: HCPCS | Performed by: UROLOGY

## 2024-06-24 PROCEDURE — 25000003 PHARM REV CODE 250: Performed by: NURSE ANESTHETIST, CERTIFIED REGISTERED

## 2024-06-24 DEVICE — URETERAL STENT
Type: IMPLANTABLE DEVICE | Site: URETER | Status: FUNCTIONAL
Brand: POLARIS™ ULTRA

## 2024-06-24 RX ORDER — OXYCODONE HYDROCHLORIDE 5 MG/1
5 TABLET ORAL EVERY 4 HOURS PRN
Status: DISCONTINUED | OUTPATIENT
Start: 2024-06-24 | End: 2024-06-24 | Stop reason: HOSPADM

## 2024-06-24 RX ORDER — PHENAZOPYRIDINE HYDROCHLORIDE 100 MG/1
100 TABLET, FILM COATED ORAL 3 TIMES DAILY PRN
Qty: 30 TABLET | Refills: 0 | Status: SHIPPED | OUTPATIENT
Start: 2024-06-24 | End: 2024-07-04

## 2024-06-24 RX ORDER — HYDROMORPHONE HYDROCHLORIDE 2 MG/ML
0.5 INJECTION, SOLUTION INTRAMUSCULAR; INTRAVENOUS; SUBCUTANEOUS EVERY 5 MIN PRN
Status: DISCONTINUED | OUTPATIENT
Start: 2024-06-24 | End: 2024-06-24 | Stop reason: HOSPADM

## 2024-06-24 RX ORDER — MIDAZOLAM HYDROCHLORIDE 1 MG/ML
INJECTION INTRAMUSCULAR; INTRAVENOUS
Status: DISCONTINUED | OUTPATIENT
Start: 2024-06-24 | End: 2024-06-24

## 2024-06-24 RX ORDER — OXYBUTYNIN CHLORIDE 5 MG/1
5 TABLET ORAL 3 TIMES DAILY PRN
Qty: 42 TABLET | Refills: 0 | Status: SHIPPED | OUTPATIENT
Start: 2024-06-24 | End: 2024-07-08

## 2024-06-24 RX ORDER — KETOROLAC TROMETHAMINE 30 MG/ML
INJECTION, SOLUTION INTRAMUSCULAR; INTRAVENOUS
Status: DISCONTINUED | OUTPATIENT
Start: 2024-06-24 | End: 2024-06-24

## 2024-06-24 RX ORDER — LIDOCAINE HYDROCHLORIDE 20 MG/ML
INJECTION, SOLUTION EPIDURAL; INFILTRATION; INTRACAUDAL; PERINEURAL
Status: DISCONTINUED | OUTPATIENT
Start: 2024-06-24 | End: 2024-06-24

## 2024-06-24 RX ORDER — ACETAMINOPHEN 10 MG/ML
INJECTION, SOLUTION INTRAVENOUS
Status: DISCONTINUED | OUTPATIENT
Start: 2024-06-24 | End: 2024-06-24

## 2024-06-24 RX ORDER — TAMSULOSIN HYDROCHLORIDE 0.4 MG/1
0.4 CAPSULE ORAL DAILY
Qty: 30 CAPSULE | Refills: 0 | Status: SHIPPED | OUTPATIENT
Start: 2024-06-24 | End: 2024-07-24

## 2024-06-24 RX ORDER — HYDROMORPHONE HYDROCHLORIDE 2 MG/ML
INJECTION, SOLUTION INTRAMUSCULAR; INTRAVENOUS; SUBCUTANEOUS
Status: DISCONTINUED | OUTPATIENT
Start: 2024-06-24 | End: 2024-06-24

## 2024-06-24 RX ORDER — ONDANSETRON HYDROCHLORIDE 2 MG/ML
4 INJECTION, SOLUTION INTRAVENOUS ONCE AS NEEDED
Status: DISCONTINUED | OUTPATIENT
Start: 2024-06-24 | End: 2024-06-24 | Stop reason: HOSPADM

## 2024-06-24 RX ORDER — KETOROLAC TROMETHAMINE 10 MG/1
10 TABLET, FILM COATED ORAL EVERY 6 HOURS
Qty: 20 TABLET | Refills: 0 | Status: SHIPPED | OUTPATIENT
Start: 2024-06-24 | End: 2024-06-29

## 2024-06-24 RX ORDER — DEXAMETHASONE SODIUM PHOSPHATE 4 MG/ML
INJECTION, SOLUTION INTRA-ARTICULAR; INTRALESIONAL; INTRAMUSCULAR; INTRAVENOUS; SOFT TISSUE
Status: DISCONTINUED | OUTPATIENT
Start: 2024-06-24 | End: 2024-06-24

## 2024-06-24 RX ORDER — CEFAZOLIN SODIUM 2 G/50ML
2 SOLUTION INTRAVENOUS
Status: COMPLETED | OUTPATIENT
Start: 2024-06-24 | End: 2024-06-24

## 2024-06-24 RX ORDER — ONDANSETRON HYDROCHLORIDE 2 MG/ML
INJECTION, SOLUTION INTRAVENOUS
Status: DISCONTINUED | OUTPATIENT
Start: 2024-06-24 | End: 2024-06-24

## 2024-06-24 RX ORDER — SODIUM CHLORIDE 0.9 % (FLUSH) 0.9 %
10 SYRINGE (ML) INJECTION
Status: DISCONTINUED | OUTPATIENT
Start: 2024-06-24 | End: 2024-06-24 | Stop reason: HOSPADM

## 2024-06-24 RX ORDER — PROPOFOL 10 MG/ML
VIAL (ML) INTRAVENOUS
Status: DISCONTINUED | OUTPATIENT
Start: 2024-06-24 | End: 2024-06-24

## 2024-06-24 RX ADMIN — HYDROMORPHONE HYDROCHLORIDE 0.5 MG: 2 INJECTION, SOLUTION INTRAMUSCULAR; INTRAVENOUS; SUBCUTANEOUS at 08:06

## 2024-06-24 RX ADMIN — PROPOFOL 200 MG: 10 INJECTION, EMULSION INTRAVENOUS at 07:06

## 2024-06-24 RX ADMIN — DEXAMETHASONE SODIUM PHOSPHATE 4 MG: 4 INJECTION, SOLUTION INTRA-ARTICULAR; INTRALESIONAL; INTRAMUSCULAR; INTRAVENOUS; SOFT TISSUE at 08:06

## 2024-06-24 RX ADMIN — KETOROLAC TROMETHAMINE 30 MG: 30 INJECTION, SOLUTION INTRAMUSCULAR at 08:06

## 2024-06-24 RX ADMIN — MIDAZOLAM HYDROCHLORIDE 2 MG: 1 INJECTION, SOLUTION INTRAMUSCULAR; INTRAVENOUS at 07:06

## 2024-06-24 RX ADMIN — ONDANSETRON 4 MG: 2 INJECTION, SOLUTION INTRAMUSCULAR; INTRAVENOUS at 08:06

## 2024-06-24 RX ADMIN — LIDOCAINE HYDROCHLORIDE 100 MG: 20 INJECTION, SOLUTION EPIDURAL; INFILTRATION; INTRACAUDAL; PERINEURAL at 07:06

## 2024-06-24 RX ADMIN — CEFAZOLIN SODIUM 2 G: 2 SOLUTION INTRAVENOUS at 08:06

## 2024-06-24 RX ADMIN — ACETAMINOPHEN 1000 MG: 10 INJECTION INTRAVENOUS at 08:06

## 2024-06-24 RX ADMIN — SODIUM CHLORIDE, SODIUM LACTATE, POTASSIUM CHLORIDE, AND CALCIUM CHLORIDE: .6; .31; .03; .02 INJECTION, SOLUTION INTRAVENOUS at 08:06

## 2024-06-24 RX ADMIN — OXYCODONE HYDROCHLORIDE 5 MG: 5 TABLET ORAL at 09:06

## 2024-06-24 NOTE — OP NOTE
[facility]  Operative Note    Date: 06/24/2024    Pre-Op Diagnosis: left ureteral stone    Patient Active Problem List    Diagnosis Date Noted    Nephrolithiasis 06/09/2024    Prediabetes 09/13/2023    Morbid obesity with body mass index (BMI) of 40.0 to 44.9 in adult 09/05/2023    Breast hypertrophy in female 09/05/2023    Suspected Adrenal adenoma 11/09/2014    Diverticulitis 11/09/2014       Post-Op Diagnosis: sane    Procedure(s) Performed:   1. Left ureteroscopy  2. Cystoscopy  3. Laser lithotripsy  4. Stone basketing  5. Left ureteral stent exchange  6. Left retrograde pyelogram  7. Fluoro < 1 hr    Specimen(s): left ureteral stone fragments    Staff Surgeon: Pasha Jeffries MD    Assistant Surgeon: Samir Escamilla MD    Anesthesia: General endotracheal anesthesia    Indications: Omkar Gan is a 49 y.o. female with a left ureteral stone presenting for definitive stone management. She is pre-stented.    Findings:  -Left hydronephrosis seen on RPG despite ureteral stent in good position  -Left ureteral stone fragmented and removed, edema from impacted stone noted at mid/proximal ureter.    -Multiple stone matrix in the left kidney. Broken up with basket and largest fragments removed    Estimated Blood Loss: min    Drains:   1. Left 6 Fr x 28 cm JJ ureteral stent without strings    Procedure in detail:  After risks, benefits, and possible complications were explained, the patient elected to undergo the procedure and informed consent was obtained. All questions were answered in the lana-operative area. The patient was transferred to the cystoscopy suite and placed in the supine position. SCDs were applied and working. Anesthesia was administered. The patient was then placed in the dorsal lithotomy position and prepped and draped in the usual sterile fashion. Time out was performed, and lana-procedural antibiotics were confirmed.     A rigid cystoscope in a 22 Fr sheath was introduced into the  patient's urethra. This passed easily. The entire urethra was visualized which showed no strictures or masses. Cystoscopy revealed the ureteral orifices in the normal anatomic location bilaterally.    The patient's left ureteral stent was grasped with alligator graspers and brought to the meatus. A motion wire was passed through the stent and into the kidney with fluoroscopic confirmation. The stent was was removed keeping the wire in place.    An 8 Fr rigid ureteroscope was passed into the patient's bladder alongside the wire under direct vision. It was then passed through the left ureteral orifice alongside the wire. A retrograde pyelogram was performed showing a filling defect in the proximal ureter and significant hydronephrosis despite previously placed stent in adequate position.. A stone was encountered at the level of the mid to proximal ureter.  A 200 micron laser fiber was passed through the ureteroscope. The stone was fragmented using the laser. The laser fiber was removed and an NCircle basket was introduced through the ureteroscope. Stone fragments were removed and placed in the bladder. The ureteroscope was reinserted and the entire length of the ureter was surveyed and no additional stone fragments were visualized.    A second wire was inserted through the ureteroscope and into the kidney with fluoroscopic confirmation. The ureteroscope was removed keeping both wires in place.    An 8 Fr flexible ureteroscope was advanced over the motion wire to the level of the renal pelvis under continuous fluoroscopy. This passed easily. The wire was removed.    Stone matrix was encountered in the renal pelvis and lower poles.  An NCircle basket was inserted per the scope and the matrix was broken up. Largest stone matrix material was removed. The flexible ureteroscope was removed keep the motion wire in place..    The cystoscope was reinserted and the bladder was irrigated to remove the stone fragments. The  bladder was drained and the cystoscope removed keeping the wire in place.    A 6 Fr x 28 cm JJ ureteral stent without strings was passed over the wire and up into the renal pelvis using fluoro. When the coil appeared to be in good position in the kidney and the radio-opaque marker of the pusher was at the inferior pubis, the wire was removed under continuous fluoro. Good coils were seen in the kidney and the bladder using fluoro.    The patient tolerated the procedure well and was transferred to the recovery room in stable condition.    Disposition:  The patient will be discharged home from PACU. She follow up with a cysto stent pull.    Samir Escamilla MD    I was present and scrubbed for the entirety of the procedure.  I agree with the operative note and have edited as needed.    Pasha Jeffries MD

## 2024-06-24 NOTE — DISCHARGE INSTRUCTIONS
Post Cystoscopy Instructions  Do not strain to have a bowel movement  No strenuous exercise x 7 days  No driving while you are on narcotic pain medications or if your peterson  catheter is in place    You can expect:  To pass stone fragments if you had a stone procedure  Have pain when you void from your stent if you have a stent in place  See blood in your urine if you have a stent in place    If you have a catheter, please return to the ER if your catheter stops draining or you are having abdominal pain.    Call the doctor if:  Temperature is greater than 101F  Persistent vomiting and inability to keep food down  Inability to void if you do not have a catheter  ANESTHESIA  -For the first 24 hours after surgery:  Do not drive, use heavy equipment, make important decisions, or drink alcohol  -It is normal to feel sleepy for several hours.  Rest until you are more awake.  -Have someone stay with you, if needed.  They can watch for problems and help keep you safe.  -Some possible post anesthesia side effects include: nausea and vomiting, sore throat and hoarseness, sleepiness, and dizziness.    PAIN  -If you have pain after surgery, pain medicine will help you feel better.  Take it as directed, before pain becomes severe.  Most pain relievers taken by mouth need at least 20-30 minutes to start working.  -Do not drive or drink alcohol while taking pain medicine.  -Pain medication can upset your stomach.  Taking them with a little food may help.  -Other ways to help control pain: elevation, ice, and relaxation  -Call your surgeon if still having unmanageable pain an hour after taking pain medicine.  -Pain medicine can cause constipation.  Taking an over-the counter stool softener while on prescription pain medicine and drinking plenty of fluids can prevent this side effect.  -Call your surgeon if you have severe side effects like: breathing problems, trouble waking up, dizziness, confusion, or severe  constipation.    NAUSEA  -Some people have nausea after surgery.  This is often because of anesthesia, pain, pain medicine, or the stress of surgery.  -Do not push yourself to eat.  Start off with clear liquids and soup.  Slowly move to solid foods.  Don't eat fatty, rich, spicy foods at first.  Eat smaller amounts.  -If you develop persistent nausea and vomiting please notify your surgeon immediately.    BLEEDING  -Different types of surgery require different types of care and dressing changes.  It is important to follow all instructions and advice from your surgeon.  Change dressing as directed.  Call your surgeon for any concerns regarding postop bleeding.    SIGNS OF INFECTION  -Signs of infection include: fever, swelling, drainage, and redness  -Notify your surgeon if you have a fever of 100.4 F (38.0 C) or higher.  -Notify your surgeon if you notice redness, swelling, increased pain, pus, or a foul smell at the incision site.

## 2024-06-24 NOTE — DISCHARGE SUMMARY
[facility] ([facility])  Discharge Note  Short Stay    Procedure(s) (LRB):  Cystoscopy, left retrograde pyelogram, left ureteroscopy with holmium laser lithotripsy, stone basket extraction, left JJ stent (Left)  REMOVAL-STENT (Left)  PYELOGRAM, RETROGRADE (Left)  EXTRACTION - STONE (Left)  CYSTOSCOPY, WITH URETERAL STENT INSERTION (Left)      OUTCOME: Patient tolerated treatment/procedure well without complication and is now ready for discharge.    DISPOSITION: Home or Self Care    FINAL DIAGNOSIS:  Nephrolithiasis    FOLLOWUP: In clinic    DISCHARGE INSTRUCTIONS:    Discharge Procedure Orders   Notify your health care provider if you experience any of the following:  temperature >100.4     Notify your health care provider if you experience any of the following:  persistent nausea and vomiting or diarrhea     Notify your health care provider if you experience any of the following:  severe uncontrolled pain     Notify your health care provider if you experience any of the following:  redness, tenderness, or signs of infection (pain, swelling, redness, odor or green/yellow discharge around incision site)     Notify your health care provider if you experience any of the following:  worsening rash     Notify your health care provider if you experience any of the following:  persistent dizziness, light-headedness, or visual disturbances     Cystoscopy w/ stent removal   Standing Status: Future Standing Exp. Date: 08/24/24        TIME SPENT ON DISCHARGE: 15 minutes

## 2024-06-24 NOTE — INTERVAL H&P NOTE
The patient has been examined and the H&P has been reviewed:    I concur with the findings and no changes have occurred since H&P was written.    Anesthesia/Surgery risks, benefits and alternative options discussed and understood by patient/family.        Left ureteral stone.    OR for cysto, left RPG, left URS/HLL/SBE/stent  The risks, benefits, alteratives of the procedure were discussed with the patient.  The patient was given time for questions, all questions were answered.  Written, informed consent was obtained.      Pasha Jeffries MD

## 2024-06-24 NOTE — ANESTHESIA PREPROCEDURE EVALUATION
06/24/2024  Omkar Gan is a 49 y.o., female for Cystoscopy, left retrograde pyelogram, left ureteroscopy with holmium laser lithotripsy, stone basket extraction, left JJ stent (Left)  Past Medical History:   Diagnosis Date    Allergy     Graves disease     Graves disease     History of kidney stones     History of uterine fibroid     Hypertension     Kidney stone      Past Surgical History:   Procedure Laterality Date    CYSTOURETEROSCOPY,WITH HOLMIUM LASER LITHOTRIPSY OF URETERAL CALCULUS Left 6/10/2024    Procedure: CYSTOURETEROSCOPY,WITH HOLMIUM LASER LITHOTRIPSY OF URETERAL CALCULUS;  Surgeon: Pasha Jeffries MD;  Location: [facility];  Service: Urology;  Laterality: Left;    FACIAL RECONSTRUCTION SURGERY      FACIAL RECONSTRUCTION SURGERY      FACIAL RECONSTRUCTION SURGERY      HYSTERECTOMY      RETROGRADE PYELOGRAPHY Left 6/10/2024    Procedure: CYSTOSCOPY, left retrograde pyelogram, left JJ stent;  Surgeon: Pasha Jeffries MD;  Location: [facility];  Service: Urology;  Laterality: Left;  MAC vs choice    URETERAL STENT PLACEMENT Left 6/10/2024    Procedure: INSERTION, STENT, URETER;  Surgeon: Pasha Jeffries MD;  Location: [facility];  Service: Urology;  Laterality: Left;     Pre-op Assessment       I have reviewed the Medications.     Review of Systems  Anesthesia Hx:             Denies Family Hx of Anesthesia complications.     Social:  Alcohol Use, Non-Smoker       Cardiovascular:     Hypertension                                        Renal/:  Chronic Renal Disease                    Physical Exam  General: Well nourished    Airway:  Mallampati: II   TM Distance: Normal  Neck ROM: Normal ROM    Dental:  Intact    Chest/Lungs:  Clear to auscultation    Heart:  Rate: Normal  Rhythm: Regular Rhythm      Anesthesia Plan  Type of Anesthesia, risks & benefits  discussed:    Anesthesia Type: Gen ETT  Intra-op Monitoring Plan: Standard ASA Monitors  Post Op Pain Control Plan: multimodal analgesia  Informed Consent: Informed consent signed with the Patient and all parties understand the risks and agree with anesthesia plan.  All questions answered.   ASA Score: 3    Ready For Surgery From Anesthesia Perspective.     .       PAST SURGICAL HISTORY:  H/O colonoscopy     Non-recurrent bilateral inguinal hernia without obstruction or gangrene

## 2024-06-24 NOTE — ANESTHESIA PROCEDURE NOTES
Intubation    Date/Time: 6/24/2024 7:58 AM    Performed by: Ever Boo III, CRNA  Authorized by: Kitty Hernández MD    Intubation:     Induction:  Intravenous    Mask Ventilation:  Easy mask    Attempts:  1    Attempted By:  CRNA    Difficult Airway Encountered?: No      Complications:  None    Airway Device:  Supraglottic airway/LMA    Airway Device Size:  4.0    Style/Cuff Inflation:  Cuffed (inflated to minimal occlusive pressure)    Secured at:  The lips    Placement Verified By:  Capnometry    Complicating Factors:  None    Findings Post-Intubation:  BS equal bilateral and atraumatic/condition of teeth unchanged

## 2024-06-24 NOTE — TRANSFER OF CARE
"Anesthesia Transfer of Care Note    Patient: Omkar Gan    Procedure(s) Performed: Procedure(s) (LRB):  Cystoscopy, left retrograde pyelogram, left ureteroscopy with holmium laser lithotripsy, stone basket extraction, left JJ stent (Left)  REMOVAL-STENT (Left)  PYELOGRAM, RETROGRADE (Left)  EXTRACTION - STONE (Left)    Patient location: PACU    Anesthesia Type: general    Transport from OR: Transported from OR on 6-10 L/min O2 by face mask with adequate spontaneous ventilation    Post pain: adequate analgesia    Post assessment: no apparent anesthetic complications and tolerated procedure well    Post vital signs: stable    Level of consciousness: awake and alert    Nausea/Vomiting: no nausea/vomiting    Complications: none    Transfer of care protocol was followed      Last vitals: Visit Vitals  /80 (BP Location: Left arm, Patient Position: Lying)   Pulse 67   Temp 36.8 °C (98.2 °F) (Tympanic)   Resp 16   Ht 5' 6" (1.676 m)   Wt 99.8 kg (220 lb)   SpO2 98%   Breastfeeding No   BMI 35.51 kg/m²     "

## 2024-06-25 NOTE — ANESTHESIA POSTPROCEDURE EVALUATION
Anesthesia Post Evaluation    Patient: Omkar Gan    Procedure(s) Performed: Procedure(s) (LRB):  Cystoscopy, left retrograde pyelogram, left ureteroscopy with holmium laser lithotripsy, stone basket extraction, left JJ stent (Left)  REMOVAL-STENT (Left)  PYELOGRAM, RETROGRADE (Left)  EXTRACTION - STONE (Left)  CYSTOSCOPY, WITH URETERAL STENT INSERTION (Left)    Final Anesthesia Type: general      Patient location during evaluation: PACU  Patient participation: Yes- Able to Participate  Level of consciousness: awake and alert  Post-procedure vital signs: reviewed and stable  Pain management: adequate  Airway patency: patent    PONV status at discharge: No PONV  Anesthetic complications: no      Cardiovascular status: blood pressure returned to baseline  Respiratory status: unassisted  Hydration status: euvolemic  Follow-up not needed.          Vitals Value Taken Time   /83 06/24/24 1100   Temp 36.7 °C (98 °F) 06/24/24 1100   Pulse 56 06/24/24 1100   Resp 18 06/24/24 1100   SpO2 98 % 06/24/24 1100         Event Time   Out of Recovery 10:03:00         Pain/Farhan Score: Pain Rating Prior to Med Admin: 3 (6/24/2024  9:39 AM)  Pain Rating Post Med Admin: 1 (6/24/2024 10:00 AM)  Farhan Score: 10 (6/24/2024 11:10 AM)

## 2024-06-26 ENCOUNTER — PATIENT MESSAGE (OUTPATIENT)
Dept: FAMILY MEDICINE | Facility: CLINIC | Age: 49
End: 2024-06-26
Payer: COMMERCIAL

## 2024-06-26 ENCOUNTER — TELEPHONE (OUTPATIENT)
Dept: FAMILY MEDICINE | Facility: CLINIC | Age: 49
End: 2024-06-26
Payer: COMMERCIAL

## 2024-06-26 DIAGNOSIS — K59.03 DRUG-INDUCED CONSTIPATION: Primary | ICD-10-CM

## 2024-06-26 RX ORDER — LACTULOSE 10 G/15ML
10 SOLUTION ORAL 3 TIMES DAILY
Qty: 630 ML | Refills: 0 | Status: SHIPPED | OUTPATIENT
Start: 2024-06-26 | End: 2024-07-10

## 2024-06-26 NOTE — TELEPHONE ENCOUNTER
Patient notified that provider is sending in an order for Lactulose. Instructed to pick the medication up today, and take a dose today. If no BM in 24 hours, go to ED to be evaluated.Verbalized understanding.

## 2024-06-27 LAB
COMPN STONE: NORMAL
LABORATORY COMMENT REPORT: NORMAL
SPECIMEN SOURCE: NORMAL
STONE ANALYSIS IR-IMP: NORMAL

## 2024-06-28 ENCOUNTER — PATIENT OUTREACH (OUTPATIENT)
Dept: ADMINISTRATIVE | Facility: OTHER | Age: 49
End: 2024-06-28
Payer: COMMERCIAL

## 2024-06-28 NOTE — PROGRESS NOTES
CHW - Follow Up    This Community Health Worker completed a follow up visit with patient during clinic visit today.  Pt/Caregiver reported: Pt came in clinic for assistance with a SNAP Application  Community Health Worker provided: CHW assisted the pt with the completion of a SNAP Application and a copy was provided to the pt to keep for her records. CHW also provided pt with free resources for Food. ([employer], [employer])  Follow up required: Yes, CHW will continue to f/u  Follow-up Outreach - Due: 7/15/2024    [facility] LOGIN:  E and U: [email or web address]  P: [email or web address]  PIN: [id number]

## 2024-07-01 NOTE — PROGRESS NOTES
Subjective:       Patient ID: Omkar Gan is a 49 y.o. female.    Chief Complaint: No chief complaint on file.     This is a 49 y.o.  female patient that is an established patient of mine.    Left stent 6/10/24, required URS/HLL to place stent given impacted stone.  Left URS/HLL/SBE/stent 6/24/24--100% Calcium oxalate monohydrate.   Stent removed 7/2/24.  Doing well.        Lab Results   Component Value Date    CREATININE 0.9 06/19/2024       ---  PMH/PSH/Medications/Allergies/Social history reviewed and as in chart.    Review of Systems   Constitutional:  Negative for activity change, chills, fatigue and fever.   Respiratory:  Negative for cough, chest tightness and shortness of breath.    Cardiovascular:  Negative for chest pain.   Gastrointestinal:  Negative for abdominal distention, abdominal pain, nausea and vomiting.   Genitourinary:  Negative for difficulty urinating, flank pain, hematuria and pelvic pain.   Musculoskeletal:  Negative for back pain and gait problem.       Objective:      Physical Exam  HENT:      Head: Normocephalic.   Pulmonary:      Effort: Pulmonary effort is normal.   Abdominal:      General: Abdomen is flat.   Musculoskeletal:      Cervical back: Normal range of motion.   Skin:     General: Skin is warm.   Neurological:      General: No focal deficit present.      Mental Status: She is alert.         Assessment:     Left ureteral stone  Kidney stones    Plan:     Stent removed  Strategies for stone prevention discussed.  This includes limiting salt and red meat, hydration (preferentially with water) to ensure at least 2.5 liters of urine output daily, adding citrate to diet with lemon juice or suitable alternative, limit soda and tea, and only consume recommended normal doses of OTC vitamins/supplements.  Literature was provided.  15 minutes spent separate from procedure on chart and visiting with patient.   Follow up in 1 month with VINICIO prior.     Pasha Jeffries MD    The above  referenced imaging and interpretations were personally reviewed.  Disclaimer: This note has been generated using voice-recognition software. There may be typographical errors that have been missed during proof-reading.

## 2024-07-02 ENCOUNTER — PATIENT MESSAGE (OUTPATIENT)
Dept: UROLOGY | Facility: CLINIC | Age: 49
End: 2024-07-02

## 2024-07-02 ENCOUNTER — PROCEDURE VISIT (OUTPATIENT)
Dept: UROLOGY | Facility: CLINIC | Age: 49
End: 2024-07-02
Payer: COMMERCIAL

## 2024-07-02 ENCOUNTER — TELEPHONE (OUTPATIENT)
Dept: UROLOGY | Facility: CLINIC | Age: 49
End: 2024-07-02

## 2024-07-02 VITALS
DIASTOLIC BLOOD PRESSURE: 71 MMHG | SYSTOLIC BLOOD PRESSURE: 145 MMHG | WEIGHT: 220.44 LBS | HEART RATE: 82 BPM | BODY MASS INDEX: 35.43 KG/M2 | HEIGHT: 66 IN

## 2024-07-02 DIAGNOSIS — N20.1 URETERAL STONE: ICD-10-CM

## 2024-07-02 DIAGNOSIS — N20.1 LEFT URETERAL STONE: Primary | ICD-10-CM

## 2024-07-02 RX ORDER — CIPROFLOXACIN 500 MG/1
500 TABLET ORAL EVERY 12 HOURS
Qty: 2 TABLET | Refills: 0 | Status: SHIPPED | OUTPATIENT
Start: 2024-07-02 | End: 2024-07-03

## 2024-07-02 RX ORDER — CIPROFLOXACIN 500 MG/1
500 TABLET ORAL
Status: COMPLETED | OUTPATIENT
Start: 2024-07-02 | End: 2024-07-02

## 2024-07-02 RX ADMIN — CIPROFLOXACIN 500 MG: 500 TABLET ORAL at 10:07

## 2024-07-02 NOTE — TELEPHONE ENCOUNTER
I called the pt regarding her message. I informed her that  will send the antibiotic (cipro) to her pharmacy. The pt is aware.

## 2024-07-02 NOTE — PROCEDURES
Cystoscopy w/ stent removal    Date/Time: 7/2/2024 8:30 AM    Performed by: Pasha Jeffries MD  Authorized by: Samir Escamilla MD  Preparation: Patient was prepped and draped in the usual sterile fashion.  Local anesthesia used: yes    Anesthesia:  Local anesthesia used: yes  Local Anesthetic: lidocaine/prilocaine emulsion    Sedation:  Patient sedated: no    Patient tolerance: patient tolerated the procedure well with no immediate complications  Comments: Procedure details:    Patient prepped and draped in normal sterile fashion.  17F flexible cystoscope introduced.  There was some edema around the stent.  The stent was then grasped with grasping forceps and removed in its entirety.  Patient tolerated the procedure well.    Pasha Jeffries MD

## 2024-07-08 ENCOUNTER — PATIENT OUTREACH (OUTPATIENT)
Dept: ADMINISTRATIVE | Facility: OTHER | Age: 49
End: 2024-07-08
Payer: COMMERCIAL

## 2024-07-08 NOTE — PROGRESS NOTES
CHW - Follow Up    This Community Health Worker completed a follow up visit with patient  via email  today.  Pt/Caregiver reported: Pt emailed stating that she had her SNAP Application phone interview today at 2:30 pm but she has not received a phone call as of yet and she was inquiring on what should she do.  Community Health Worker provided: CHW provided pt with the SNAP contact information to call and speak with a representative.   Follow up required: Yes, CHW will continue to f/u   Follow-up Outreach - Due: 7/29/2024

## 2024-07-18 DIAGNOSIS — K59.03 DRUG-INDUCED CONSTIPATION: ICD-10-CM

## 2024-07-18 RX ORDER — LACTULOSE 10 G/15ML
10 SOLUTION ORAL; RECTAL 3 TIMES DAILY
COMMUNITY
Start: 2024-06-26 | End: 2024-08-01

## 2024-07-18 NOTE — TELEPHONE ENCOUNTER
No care due was identified.  [facility] Embedded Care Due Messages. Reference number: [id number].   7/18/2024 9:51:11 AM CDT

## 2024-07-18 NOTE — TELEPHONE ENCOUNTER
No care due was identified.  [facility] Embedded Care Due Messages. Reference number: [id number].   7/18/2024 9:59:09 AM CDT

## 2024-07-19 RX ORDER — LACTULOSE 10 G/15ML
10 SOLUTION ORAL; RECTAL 3 TIMES DAILY
Qty: 1350 ML | Refills: 1 | OUTPATIENT
Start: 2024-07-19 | End: 2024-08-24

## 2024-07-19 RX ORDER — LACTULOSE 10 G/15ML
10 SOLUTION ORAL 3 TIMES DAILY
Qty: 630 ML | Refills: 0 | Status: SHIPPED | OUTPATIENT
Start: 2024-07-19 | End: 2024-08-02

## 2024-08-02 ENCOUNTER — HOSPITAL ENCOUNTER (OUTPATIENT)
Dept: RADIOLOGY | Facility: HOSPITAL | Age: 49
Discharge: HOME | End: 2024-08-02
Attending: UROLOGY
Payer: COMMERCIAL

## 2024-08-02 DIAGNOSIS — N20.1 LEFT URETERAL STONE: ICD-10-CM

## 2024-08-02 PROCEDURE — 76770 US EXAM ABDO BACK WALL COMP: CPT | Mod: TC

## 2024-08-02 PROCEDURE — 76770 US EXAM ABDO BACK WALL COMP: CPT | Mod: 26,,, | Performed by: RADIOLOGY

## 2024-08-06 ENCOUNTER — PATIENT OUTREACH (OUTPATIENT)
Dept: ADMINISTRATIVE | Facility: HOSPITAL | Age: 49
End: 2024-08-06
Payer: COMMERCIAL

## 2024-08-19 ENCOUNTER — HOSPITAL ENCOUNTER (EMERGENCY)
Facility: HOSPITAL | Age: 49
Discharge: HOME | End: 2024-08-19
Attending: EMERGENCY MEDICINE
Payer: COMMERCIAL

## 2024-08-19 VITALS
RESPIRATION RATE: 20 BRPM | OXYGEN SATURATION: 99 % | SYSTOLIC BLOOD PRESSURE: 136 MMHG | DIASTOLIC BLOOD PRESSURE: 102 MMHG | HEIGHT: 66 IN | WEIGHT: 220 LBS | HEART RATE: 87 BPM | TEMPERATURE: 99 F | BODY MASS INDEX: 35.36 KG/M2

## 2024-08-19 DIAGNOSIS — M79.606 LEG PAIN: ICD-10-CM

## 2024-08-19 DIAGNOSIS — M71.22 SYNOVIAL CYST OF LEFT POPLITEAL SPACE: Primary | ICD-10-CM

## 2024-08-19 DIAGNOSIS — M19.90 ARTHRITIS: ICD-10-CM

## 2024-08-19 PROCEDURE — 99284 EMERGENCY DEPT VISIT MOD MDM: CPT | Mod: 25

## 2024-08-19 RX ORDER — NAPROXEN 500 MG/1
500 TABLET ORAL 2 TIMES DAILY WITH MEALS
Qty: 20 TABLET | Refills: 0 | Status: SHIPPED | OUTPATIENT
Start: 2024-08-19 | End: 2024-08-21 | Stop reason: ALTCHOICE

## 2024-08-19 NOTE — DISCHARGE INSTRUCTIONS

## 2024-08-19 NOTE — ED TRIAGE NOTES
Pt presents to ED today c/o non traumatic left knee pain x  1 week   Unrelieved by OTC medication   Denies hx of blood clots

## 2024-08-19 NOTE — ED PROVIDER NOTES
Encounter Date: 8/19/2024       History     Chief Complaint   Patient presents with    Knee Pain     Pt reports L knee pain radiating down to ankle x2 days. Pt denies trauma or other complaints.      49-year-old female presents emergency room with reports of left knee and lower leg pain.  Patient states the symptoms began approximately 2 days ago.  She denies any known injury.  Denies fever.  Patient is an [employer] .  Denies chest pain or shortness of breath.  Patient has a past medical history of allergies, Graves disease, kidney stones, uterine fibroids, hypertension, kidney stones.    The history is provided by the patient. No  was used.     Review of patient's allergies indicates:   Allergen Reactions    Penicillins Nausea And Vomiting     + dizziness     Past Medical History:   Diagnosis Date    Allergy     Graves disease     Graves disease     History of kidney stones     History of uterine fibroid     Hypertension     Kidney stone      Past Surgical History:   Procedure Laterality Date    CYSTOSCOPY W/ URETERAL STENT PLACEMENT Left 6/24/2024    Procedure: CYSTOSCOPY, WITH URETERAL STENT INSERTION;  Surgeon: Pasha Jeffries MD;  Location: [facility] OR;  Service: Urology;  Laterality: Left;    CYSTOURETEROSCOPY, WITH HOLMIUM LASER LITHOTRIPSY OF URETERAL CALCULUS AND STENT INSERTION Left 6/24/2024    Procedure: Cystoscopy, left retrograde pyelogram, left ureteroscopy with holmium laser lithotripsy, stone basket extraction, left JJ stent;  Surgeon: Pasha Jeffries MD;  Location: [facility] OR;  Service: Urology;  Laterality: Left;    CYSTOURETEROSCOPY,WITH HOLMIUM LASER LITHOTRIPSY OF URETERAL CALCULUS Left 6/10/2024    Procedure: CYSTOURETEROSCOPY,WITH HOLMIUM LASER LITHOTRIPSY OF URETERAL CALCULUS;  Surgeon: Pasha Jeffries MD;  Location: [facility] OR;  Service: Urology;  Laterality: Left;    EXTRACTION - STONE Left 6/24/2024    Procedure: EXTRACTION - STONE;  Surgeon: Mervin  Pasha HERNÁNDEZ MD;  Location: [facility];  Service: Urology;  Laterality: Left;    FACIAL RECONSTRUCTION SURGERY      FACIAL RECONSTRUCTION SURGERY      FACIAL RECONSTRUCTION SURGERY      HYSTERECTOMY      REMOVAL-STENT Left 6/24/2024    Procedure: REMOVAL-STENT;  Surgeon: Pasha Jeffries MD;  Location: [facility] OR;  Service: Urology;  Laterality: Left;    RETROGRADE PYELOGRAPHY Left 6/10/2024    Procedure: CYSTOSCOPY, left retrograde pyelogram, left JJ stent;  Surgeon: Pasha Jeffries MD;  Location: [facility] OR;  Service: Urology;  Laterality: Left;  MAC vs choice    RETROGRADE PYELOGRAPHY Left 6/24/2024    Procedure: PYELOGRAM, RETROGRADE;  Surgeon: Pasha Jeffries MD;  Location: [facility] OR;  Service: Urology;  Laterality: Left;    URETERAL STENT PLACEMENT Left 6/10/2024    Procedure: INSERTION, STENT, URETER;  Surgeon: Pasha Jeffries MD;  Location: [facility];  Service: Urology;  Laterality: Left;     Family History   Problem Relation Name Age of Onset    Hypertension Maternal Grandmother      Pacemaker/defibrilator Maternal Grandmother       Social History     Tobacco Use    Smoking status: Never    Smokeless tobacco: Never   Substance Use Topics    Alcohol use: Yes     Alcohol/week: 7.0 standard drinks of alcohol     Types: 7 Glasses of wine per week     Comment: states having a drink or two every day    Drug use: No     Review of Systems   Musculoskeletal:         Left knee pain and lower leg pain.   All other systems reviewed and are negative.      Physical Exam     Initial Vitals [08/19/24 1548]   BP Pulse Resp Temp SpO2   (!) 136/102 87 20 98.6 °F (37 °C) 99 %      MAP       --         Physical Exam    Constitutional: She appears well-developed and well-nourished.   HENT:   Head: Normocephalic.   Right Ear: Hearing and tympanic membrane normal.   Left Ear: Hearing and tympanic membrane normal.   Nose: Nose normal.   Mouth/Throat: Oropharynx is clear and moist.   Eyes: Lids are normal. Pupils are equal,  round, and reactive to light.   Neck:   Normal range of motion.  Cardiovascular:  Normal rate.           Pulmonary/Chest: Breath sounds normal. No respiratory distress. She has no wheezes. She has no rhonchi.   Abdominal: Abdomen is soft. There is no abdominal tenderness.   Musculoskeletal:         General: Normal range of motion.      Cervical back: Normal range of motion. No rigidity.      Left knee: Bony tenderness present. No deformity, effusion or erythema. Normal pulse.      Comments: No surrounding erythema or evidence of septic joint.  +pt/dp noted     Neurological: She is alert and oriented to person, place, and time.   Skin: Skin is warm and dry. No rash noted.   Psychiatric: She has a normal mood and affect. Her behavior is normal. Judgment and thought content normal.         ED Course   Procedures  Labs Reviewed - No data to display       Imaging Results              X-Ray Knee 3 View Left (Final result)  Result time 08/19/24 17:00:33      Final result by Humberto Porter MD (08/19/24 17:00:33)                   Impression:      1. No acute displaced fracture or dislocation of the knee.      Electronically signed by: Humberto Porter MD  Date:    08/19/2024  Time:    17:00               Narrative:    EXAMINATION:  XR KNEE 3 VIEW LEFT    CLINICAL HISTORY:  Unspecified injury of unspecified lower leg, initial encounter    TECHNIQUE:  AP, lateral, and Merchant views of the left knee were performed.    COMPARISON:  None    FINDINGS:  Three views left knee.    Are degenerative changes of the knee noting medial compartmental narrowing.  No large knee joint effusion.  No acute displaced fracture or dislocation of the knee.  No radiopaque foreign body.                                       US Lower Extremity Veins Left (Final result)  Result time 08/19/24 16:53:47      Final result by Hugo Martinez MD (08/19/24 16:53:47)                   Impression:      No evidence of deep venous thrombosis in the  left lower extremity.    Left popliteal fossa cyst.      Electronically signed by: Hugo Martinez  Date:    08/19/2024  Time:    16:53               Narrative:    EXAMINATION:  US LOWER EXTREMITY VEINS LEFT    CLINICAL HISTORY:  Pain in leg, unspecified    TECHNIQUE:  Duplex and color flow Doppler evaluation and graded compression of the left lower extremity veins was performed.    COMPARISON:  None    FINDINGS:  Left thigh veins: The common femoral, femoral, popliteal, upper greater saphenous, and deep femoral veins appear color Doppler patent and/or compressible.    Left calf veins: The visualized calf veins appear patent.    Contralateral CFV: The contralateral (right) common femoral vein appears patent.    Miscellaneous: Anechoic area at the popliteal fossa measuring 3.7 x 3.4 x 3.1 cm suggesting Baker's cyst.                                       Medications - No data to display  Medical Decision Making  Differential Diagnosis includes, but is not limited to:  Fracture, dislocation, compartment syndrome, nerve injury/palsy, vascular injury, DVT, rhabdomyolysis, hemarthrosis, septic joint, cellulitis, bursitis, muscle strain, ligament tear/sprain, laceration, foreign body, abrasion, soft tissue contusion, osteoarthritis.       Amount and/or Complexity of Data Reviewed  Radiology: ordered.    Risk  Prescription drug management.               ED Course as of 08/19/24 1711   Mon Aug 19, 2024   1704 FINDINGS:  Three views left knee.     Are degenerative changes of the knee noting medial compartmental narrowing.  No large knee joint effusion.  No acute displaced fracture or dislocation of the knee.  No radiopaque foreign body.     Impression:     1. No acute displaced fracture or dislocation of the knee.         [DT]   1704 FINDINGS:  Left thigh veins: The common femoral, femoral, popliteal, upper greater saphenous, and deep femoral veins appear color Doppler patent and/or compressible.     Left calf veins: The  visualized calf veins appear patent.     Contralateral CFV: The contralateral (right) common femoral vein appears patent.     Miscellaneous: Anechoic area at the popliteal fossa measuring 3.7 x 3.4 x 3.1 cm suggesting Baker's cyst.     Impression:     No evidence of deep venous thrombosis in the left lower extremity.     Left popliteal fossa cyst.         [DT]   1706 Independent interpretation of the x-ray of the knee identifies degenerative changes however no acute fractures.  Interpretation of the ultrasound identifies a Baker's cyst with no DVT.  Patient notified of need for follow up care with the orthopedic in which a referral was placed today.  We will prescribe anti-inflammatories in addition to an Ace wrap and crutches.  Strict return precautions have been given otherwise she is stable at this time for discharge. [DT]      ED Course User Index  [DT] Sonia Uribe NP                           Clinical Impression:  Final diagnoses:  [M79.606] Leg pain  [M71.22] Synovial cyst of left popliteal space (Primary)  [M19.90] Arthritis          ED Disposition Condition    Discharge Stable          ED Prescriptions       Medication Sig Dispense Start Date End Date Auth. Provider    naproxen (NAPROSYN) 500 MG tablet Take 1 tablet (500 mg total) by mouth 2 (two) times daily with meals. for 10 days 20 tablet 8/19/2024 8/29/2024 Sonia Uribe NP          Follow-up Information       Follow up With Specialties Details Why Contact Info    Funmilayo Kumar A.M., MD Family Medicine Schedule an appointment as soon as possible for a visit in 2 days  [address]  [place] [address]  [phone]      Yaniv Ewing MD Orthopedic Surgery Schedule an appointment as soon as possible for a visit in 2 days  [address] [facility]  [address]  [place] [address]  [phone]               Sonia Uribe NP  08/19/24 9966

## 2024-08-19 NOTE — Clinical Note
"Cendria "Chavoia" Malik was seen and treated in our emergency department on 8/19/2024.  She may return to work on 08/23/2024.       If you have any questions or concerns, please don't hesitate to call.      Sonia Uribe, NP"

## 2024-08-20 ENCOUNTER — PATIENT MESSAGE (OUTPATIENT)
Dept: FAMILY MEDICINE | Facility: CLINIC | Age: 49
End: 2024-08-20

## 2024-08-20 ENCOUNTER — OFFICE VISIT (OUTPATIENT)
Dept: FAMILY MEDICINE | Facility: CLINIC | Age: 49
End: 2024-08-20
Payer: COMMERCIAL

## 2024-08-20 VITALS
HEART RATE: 94 BPM | DIASTOLIC BLOOD PRESSURE: 86 MMHG | WEIGHT: 230.63 LBS | SYSTOLIC BLOOD PRESSURE: 132 MMHG | BODY MASS INDEX: 37.06 KG/M2 | OXYGEN SATURATION: 98 % | HEIGHT: 66 IN

## 2024-08-20 DIAGNOSIS — M25.562 CHRONIC PAIN OF LEFT KNEE: Primary | ICD-10-CM

## 2024-08-20 DIAGNOSIS — R03.0 ELEVATED BP WITHOUT DIAGNOSIS OF HYPERTENSION: ICD-10-CM

## 2024-08-20 DIAGNOSIS — M71.22 SYNOVIAL CYST OF LEFT POPLITEAL SPACE: ICD-10-CM

## 2024-08-20 DIAGNOSIS — E66.09 CLASS 2 OBESITY DUE TO EXCESS CALORIES WITHOUT SERIOUS COMORBIDITY WITH BODY MASS INDEX (BMI) OF 37.0 TO 37.9 IN ADULT: ICD-10-CM

## 2024-08-20 DIAGNOSIS — G89.29 CHRONIC PAIN OF LEFT KNEE: Primary | ICD-10-CM

## 2024-08-20 PROBLEM — E66.812 CLASS 2 OBESITY DUE TO EXCESS CALORIES WITHOUT SERIOUS COMORBIDITY WITH BODY MASS INDEX (BMI) OF 37.0 TO 37.9 IN ADULT: Status: ACTIVE | Noted: 2024-08-20

## 2024-08-20 PROCEDURE — 1160F RVW MEDS BY RX/DR IN RCRD: CPT | Mod: CPTII,S$GLB,, | Performed by: FAMILY MEDICINE

## 2024-08-20 PROCEDURE — 99999 PR PBB SHADOW E&M-EST. PATIENT-LVL IV: CPT | Mod: PBBFAC,,, | Performed by: FAMILY MEDICINE

## 2024-08-20 PROCEDURE — 99215 OFFICE O/P EST HI 40 MIN: CPT | Mod: S$GLB,,, | Performed by: FAMILY MEDICINE

## 2024-08-20 PROCEDURE — 3079F DIAST BP 80-89 MM HG: CPT | Mod: CPTII,S$GLB,, | Performed by: FAMILY MEDICINE

## 2024-08-20 PROCEDURE — 1159F MED LIST DOCD IN RCRD: CPT | Mod: CPTII,S$GLB,, | Performed by: FAMILY MEDICINE

## 2024-08-20 PROCEDURE — 3075F SYST BP GE 130 - 139MM HG: CPT | Mod: CPTII,S$GLB,, | Performed by: FAMILY MEDICINE

## 2024-08-20 PROCEDURE — 3008F BODY MASS INDEX DOCD: CPT | Mod: CPTII,S$GLB,, | Performed by: FAMILY MEDICINE

## 2024-08-20 RX ORDER — TRAMADOL HYDROCHLORIDE 50 MG/1
50 TABLET ORAL EVERY 8 HOURS PRN
Qty: 21 TABLET | Refills: 0 | Status: SHIPPED | OUTPATIENT
Start: 2024-08-20

## 2024-08-20 NOTE — PROGRESS NOTES
Subjective:       Patient ID: Omkar Gan is a 49 y.o. female.    Chief Complaint: Knee Pain    HPI  50 yo female with obesity presents c/o left knee pain and swelling. Pt has had recurrent knee pain for years. Pain has been worsening over the past 3 months. Pt notes severe pain on 8/18. Seen in ER on 8/19 and diagnosed with a synovial cyst of the left popliteal space. Pt notes no relief of pain with NSAIDs, ice and elevation. Pain exacerbated by bending the knee and ambulation. Pt is ambulating with crutches. Rates pain as 10/10. Pain disturbs her sleep.   Review of Systems   Respiratory:  Negative for shortness of breath.    Cardiovascular:  Negative for chest pain.   Musculoskeletal:         See HPI       Objective:      Physical Exam  Vitals reviewed.   Constitutional:       General: She is not in acute distress.     Appearance: Normal appearance. She is obese. She is not ill-appearing.      Comments: Ambulates with crutches   HENT:      Head: Normocephalic and atraumatic.   Neck:      Vascular: No carotid bruit.   Cardiovascular:      Rate and Rhythm: Normal rate and regular rhythm.      Pulses: Normal pulses.      Heart sounds: Normal heart sounds. No murmur heard.     No gallop.   Pulmonary:      Effort: Pulmonary effort is normal.      Breath sounds: Normal breath sounds. No wheezing or rales.   Abdominal:      General: Bowel sounds are normal.      Palpations: Abdomen is soft. There is no mass.      Tenderness: There is no abdominal tenderness.   Musculoskeletal:      Cervical back: Normal range of motion and neck supple. No rigidity or tenderness.      Right knee: Normal.      Left knee: Swelling present. Decreased range of motion. Tenderness present over the medial joint line and lateral joint line.      Right lower leg: No edema.      Left lower leg: No edema.   Neurological:      Mental Status: She is alert and oriented to person, place, and time.   Psychiatric:         Mood and Affect: Mood normal.          Assessment:         See plan  Plan:       Chronic pain of left knee  -    Add traMADoL (ULTRAM) 50 mg tablet; Take 1 tablet (50 mg total) by mouth every 8 (eight) hours as needed for Pain.  Dispense: 21 tablet; Refill: 0  - F/U with Orthopedics    Synovial cyst of left popliteal space  -   Add  traMADoL (ULTRAM) 50 mg tablet; Take 1 tablet (50 mg total) by mouth every 8 (eight) hours as needed for Pain.  Dispense: 21 tablet; Refill: 0  - F/U with Orthopedics    Class 2 obesity due to excess calories without serious comorbidity with body mass index (BMI) of 37.0 to 37.9 in adult  The patient's BMI has been recorded in the chart. The patient has been provided educational materials regarding the benefits of attaining and maintaining a normal weight. We will continue to address and follow this issue during follow up visits.     Elevated BP without diagnosis of hypertension  - Probable due to pain. Recheck BP in October 2024.

## 2024-08-21 ENCOUNTER — OFFICE VISIT (OUTPATIENT)
Dept: ORTHOPEDICS | Facility: CLINIC | Age: 49
End: 2024-08-21
Payer: COMMERCIAL

## 2024-08-21 ENCOUNTER — PATIENT OUTREACH (OUTPATIENT)
Dept: ADMINISTRATIVE | Facility: OTHER | Age: 49
End: 2024-08-21
Payer: COMMERCIAL

## 2024-08-21 VITALS — BODY MASS INDEX: 36.99 KG/M2 | HEIGHT: 66 IN | WEIGHT: 230.19 LBS

## 2024-08-21 DIAGNOSIS — M17.12 PRIMARY OSTEOARTHRITIS OF LEFT KNEE: Primary | ICD-10-CM

## 2024-08-21 PROCEDURE — 3008F BODY MASS INDEX DOCD: CPT | Mod: CPTII,S$GLB,, | Performed by: ORTHOPAEDIC SURGERY

## 2024-08-21 PROCEDURE — 1160F RVW MEDS BY RX/DR IN RCRD: CPT | Mod: CPTII,S$GLB,, | Performed by: ORTHOPAEDIC SURGERY

## 2024-08-21 PROCEDURE — 20610 DRAIN/INJ JOINT/BURSA W/O US: CPT | Mod: LT,S$GLB,, | Performed by: ORTHOPAEDIC SURGERY

## 2024-08-21 PROCEDURE — 99999 PR PBB SHADOW E&M-EST. PATIENT-LVL III: CPT | Mod: PBBFAC,,, | Performed by: ORTHOPAEDIC SURGERY

## 2024-08-21 PROCEDURE — 1159F MED LIST DOCD IN RCRD: CPT | Mod: CPTII,S$GLB,, | Performed by: ORTHOPAEDIC SURGERY

## 2024-08-21 PROCEDURE — 99203 OFFICE O/P NEW LOW 30 MIN: CPT | Mod: 25,S$GLB,, | Performed by: ORTHOPAEDIC SURGERY

## 2024-08-21 RX ORDER — MELOXICAM 15 MG/1
15 TABLET ORAL DAILY
Qty: 30 TABLET | Refills: 1 | Status: SHIPPED | OUTPATIENT
Start: 2024-08-21

## 2024-08-21 RX ADMIN — TRIAMCINOLONE ACETONIDE 40 MG: 40 INJECTION, SUSPENSION INTRA-ARTICULAR; INTRAMUSCULAR at 01:08

## 2024-08-21 NOTE — LETTER
August 21, 2024      [facility] 5th [address]  [address], [address]  [facility] [address]  Phone: [phone]       Patient: Omkar Gan   YOB: 1975  Date of Visit: 08/21/2024    To Whom It May Concern:    Radha Gan  was at [facility] on 08/21/2024. The patient may return to work on 8/26/2024 with no restrictions. If you have any questions or concerns, or if I can be of further assistance, please do not hesitate to contact me.    Sincerely,    Soy De Leon MD

## 2024-08-21 NOTE — PROGRESS NOTES
CHW - Case Closure    This Community Health Worker sent CC to patient  via mail  today.   Pt/Caregiver reported: Pt Denied for SNAP Assistance due to being over the income guidelines. CHW has already previously provided pt with [employer]. All needs have been addressed. Case Closed  Pt/Caregiver denied any additional needs at this time and agrees with episode closure at this time.  Provided patient with Community Health Worker's contact information and encouraged him/her to contact this Community Health Worker if additional needs arise.

## 2024-08-22 ENCOUNTER — PATIENT MESSAGE (OUTPATIENT)
Dept: ORTHOPEDICS | Facility: CLINIC | Age: 49
End: 2024-08-22
Payer: COMMERCIAL

## 2024-08-22 ENCOUNTER — PATIENT MESSAGE (OUTPATIENT)
Dept: FAMILY MEDICINE | Facility: CLINIC | Age: 49
End: 2024-08-22
Payer: COMMERCIAL

## 2024-08-22 DIAGNOSIS — F43.81 PROLONGED GRIEF DISORDER: Primary | ICD-10-CM

## 2024-08-27 RX ORDER — TRIAMCINOLONE ACETONIDE 40 MG/ML
40 INJECTION, SUSPENSION INTRA-ARTICULAR; INTRAMUSCULAR
Status: DISCONTINUED | OUTPATIENT
Start: 2024-08-21 | End: 2024-08-27 | Stop reason: HOSPADM

## 2024-08-27 NOTE — PROCEDURES
Large Joint Aspiration/Injection: L knee    Date/Time: 8/21/2024 1:00 PM    Performed by: Soy De Leon MD  Authorized by: Soy De Leon MD    Consent Done?:  Yes (Verbal)  Indications:  Pain and arthritis  Site marked: the procedure site was marked    Timeout: prior to procedure the correct patient, procedure, and site was verified    Prep: patient was prepped and draped in usual sterile fashion      Local anesthesia used?: Yes    Local anesthetic:  Lidocaine 1% without epinephrine  Anesthetic total (ml):  5      Details:  Needle Size:  25 G  Ultrasonic Guidance for needle placement?: No    Approach:  Anterolateral  Location:  Knee  Site:  L knee  Medications:  40 mg triamcinolone acetonide 40 mg/mL  Patient tolerance:  Patient tolerated the procedure well with no immediate complications

## 2024-08-27 NOTE — PROGRESS NOTES
Subjective:       Patient ID: Omkar Gan is a 49 y.o. female.    Chief Complaint:   Pain of the Left Knee  She comes in for initial opinion and advice regarding pain in the left knee.  She has missed a few days of work because of this.  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  Her knee was more swollen, but this has subsided some with naproxen and ice.  She notices a painful grinding in the knee.  No groin pain, distal numbness or tingling.  No fall, accident, injury, history of pertinent surgery.    Past Medical History:   Diagnosis Date    Allergy     Graves disease     Graves disease     History of kidney stones     History of uterine fibroid     Hypertension     Kidney stone      Past Surgical History:   Procedure Laterality Date    CYSTOSCOPY W/ URETERAL STENT PLACEMENT Left 6/24/2024    Procedure: CYSTOSCOPY, WITH URETERAL STENT INSERTION;  Surgeon: Pasha Jeffries MD;  Location: [facility];  Service: Urology;  Laterality: Left;    CYSTOURETEROSCOPY, WITH HOLMIUM LASER LITHOTRIPSY OF URETERAL CALCULUS AND STENT INSERTION Left 6/24/2024    Procedure: Cystoscopy, left retrograde pyelogram, left ureteroscopy with holmium laser lithotripsy, stone basket extraction, left JJ stent;  Surgeon: Pasha Jeffries MD;  Location: [facility] OR;  Service: Urology;  Laterality: Left;    CYSTOURETEROSCOPY,WITH HOLMIUM LASER LITHOTRIPSY OF URETERAL CALCULUS Left 6/10/2024    Procedure: CYSTOURETEROSCOPY,WITH HOLMIUM LASER LITHOTRIPSY OF URETERAL CALCULUS;  Surgeon: Pasha Jeffries MD;  Location: [facility] OR;  Service: Urology;  Laterality: Left;    EXTRACTION - STONE Left 6/24/2024    Procedure: EXTRACTION - STONE;  Surgeon: Pasha Jeffries MD;  Location: [facility] OR;  Service: Urology;  Laterality: Left;    FACIAL RECONSTRUCTION SURGERY      FACIAL RECONSTRUCTION SURGERY      FACIAL RECONSTRUCTION SURGERY      HYSTERECTOMY      REMOVAL-STENT Left 6/24/2024     Procedure: REMOVAL-STENT;  Surgeon: Pasha Jeffries MD;  Location: [facility] OR;  Service: Urology;  Laterality: Left;    RETROGRADE PYELOGRAPHY Left 6/10/2024    Procedure: CYSTOSCOPY, left retrograde pyelogram, left JJ stent;  Surgeon: Pasha Jeffries MD;  Location: [facility] OR;  Service: Urology;  Laterality: Left;  MAC vs choice    RETROGRADE PYELOGRAPHY Left 6/24/2024    Procedure: PYELOGRAM, RETROGRADE;  Surgeon: Pasha Jeffries MD;  Location: [facility] OR;  Service: Urology;  Laterality: Left;    URETERAL STENT PLACEMENT Left 6/10/2024    Procedure: INSERTION, STENT, URETER;  Surgeon: Pasha Jeffries MD;  Location: [facility] OR;  Service: Urology;  Laterality: Left;     Family History   Problem Relation Name Age of Onset    Hypertension Maternal Grandmother      Pacemaker/defibrilator Maternal Grandmother       Social History     Socioeconomic History    Marital status:     Number of children: 4   Occupational History    Occupation:    Tobacco Use    Smoking status: Never     Passive exposure: Never    Smokeless tobacco: Never   Substance and Sexual Activity    Alcohol use: Yes     Alcohol/week: 7.0 standard drinks of alcohol     Types: 7 Glasses of wine per week     Comment: states having a drink or two every day    Drug use: No    Sexual activity: Not Currently     Partners: Male     Social Determinants of Health     Financial Resource Strain: High Risk (6/18/2024)    Overall Financial Resource Strain (CARDIA)     Difficulty of Paying Living Expenses: Very hard   Food Insecurity: Food Insecurity Present (6/18/2024)    Hunger Vital Sign     Worried About Running Out of Food in the Last Year: Often true     Ran Out of Food in the Last Year: Often true   Transportation Needs: No Transportation Needs (6/11/2024)    TRANSPORTATION NEEDS     Transportation : No   Physical Activity: Sufficiently Active (6/18/2024)    Exercise Vital Sign     Days of Exercise per Week: 6 days      "Minutes of Exercise per Session: 60 min   Stress: Stress Concern Present (6/18/2024)    [employer] - Occupational Stress Questionnaire     Feeling of Stress : Rather much   Housing Stability: High Risk (6/18/2024)    Housing Stability Vital Sign     Unable to Pay for Housing in the Last Year: Yes     Homeless in the Last Year: No       Current Outpatient Medications   Medication Sig Dispense Refill    tamsulosin (FLOMAX) 0.4 mg Cap Take 1 capsule (0.4 mg total) by mouth once daily. 30 capsule 11    traMADoL (ULTRAM) 50 mg tablet Take 1 tablet (50 mg total) by mouth every 8 (eight) hours as needed for Pain. 21 tablet 0    meloxicam (MOBIC) 15 MG tablet Take 1 tablet (15 mg total) by mouth once daily. 30 tablet 1     No current facility-administered medications for this visit.     Review of patient's allergies indicates:   Allergen Reactions    Penicillins Nausea And Vomiting     + dizziness         Objective:      Vitals:    08/21/24 1300   Weight: 104.4 kg (230 lb 2.6 oz)   Height: 5' 6" (1.676 m)     Physical Exam  There is is a mild varus deformity, which is passively correctable.  Active range of motion is 0-105 degrees.  Anterior crepitus with active extension.  Patellar mobility is limited.  Boggy synovitis without effusion.  Medial joint line tenderness predominates.  No instability to varus/valgus/Lachman's stressing.  No pain in the groin with flexion and internal rotation of the hip which is not limited.  Skin intact.  Distal neurovascular intact.  Flip test negative.    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  The patient is prescribed meloxicam for short-term daily use, with intermittent as-needed use over the longer term.   " Appropriate cautions are provided regarding possible GI and renal adverse effects.  The synergy of meloxicam and acetaminophen is described, and Tylenol Arthritis or the generic equivalent is recommended, as often as every 8 hours.  The patient is cautioned to avoid exceeding 3000 mg of Tylenol every 24 hours.  The record is reviewed regarding history renal or hepatic dysfunction, history of GI disease.    We also did give her a cortisone injection today.  We wrote for a return to work in a few days, and she will let me know if she is not able to tolerate this.     The patient's pathophysiology was explained in detail with reference to x-rays, models, other visual aids as appropriate.  Treatment options were discussed in detail.  Questions were invited and answered to the patient's satisfaction.    Soy De Leon MD  Orthopaedic Surgery

## 2024-09-13 ENCOUNTER — PATIENT OUTREACH (OUTPATIENT)
Dept: ADMINISTRATIVE | Facility: HOSPITAL | Age: 49
End: 2024-09-13
Payer: COMMERCIAL

## 2024-09-13 DIAGNOSIS — Z12.31 BREAST CANCER SCREENING BY MAMMOGRAM: Primary | ICD-10-CM

## 2024-09-13 DIAGNOSIS — Z12.11 ENCOUNTER FOR COLORECTAL CANCER SCREENING: ICD-10-CM

## 2024-09-13 DIAGNOSIS — Z12.12 ENCOUNTER FOR COLORECTAL CANCER SCREENING: ICD-10-CM

## 2024-09-13 NOTE — PROGRESS NOTES
Health Maintenance Topic(s) Outreach Outcomes & Actions Taken:    Breast Cancer Screening - Outreach Outcomes & Actions Taken  : Pt Declined Scheduling Mammogram    Colorectal Cancer Screening - Outreach Outcomes & Actions Taken  : Colonoscopy Case Request / Referral / Home Test Order Placed    Lab(s) - Outreach Outcomes & Actions Taken  : Patient Declined Scheduling Labs or Will Call Back to Schedule     Additional Notes:       Care Management, Digital Medicine, and/or Education Referrals  Next Steps - Referral Actions: Digital Medicine Outcomes and Actions Taken: Pt Declined or Not Eligible

## 2024-09-18 DIAGNOSIS — K59.03 DRUG-INDUCED CONSTIPATION: ICD-10-CM

## 2024-09-18 DIAGNOSIS — M25.562 CHRONIC PAIN OF LEFT KNEE: ICD-10-CM

## 2024-09-18 DIAGNOSIS — G89.29 CHRONIC PAIN OF LEFT KNEE: ICD-10-CM

## 2024-09-18 DIAGNOSIS — M71.22 SYNOVIAL CYST OF LEFT POPLITEAL SPACE: ICD-10-CM

## 2024-09-18 RX ORDER — TRAMADOL HYDROCHLORIDE 50 MG/1
50 TABLET ORAL EVERY 8 HOURS PRN
Qty: 21 TABLET | Refills: 0 | Status: SHIPPED | OUTPATIENT
Start: 2024-09-18

## 2024-09-18 NOTE — TELEPHONE ENCOUNTER
No care due was identified.  [facility] Embedded Care Due Messages. Reference number: [id number].   9/18/2024 4:22:45 PM CDT

## 2024-09-18 NOTE — TELEPHONE ENCOUNTER
No care due was identified.  [facility] Embedded Care Due Messages. Reference number: [id number].   9/18/2024 5:35:05 AM CDT

## 2024-09-19 RX ORDER — LACTULOSE 10 G/15ML
SOLUTION ORAL; RECTAL
Qty: 630 ML | Refills: 0 | Status: SHIPPED | OUTPATIENT
Start: 2024-09-19

## 2024-09-19 NOTE — TELEPHONE ENCOUNTER
Refill Routing Note   Medication(s) are not appropriate for processing by [facility] for the following reason(s):        Outside of protocol    [facility] action(s):  Route             Appointments  past 12m or future 3m with PCP    Date Provider   Last Visit   8/20/2024 Funmilayo Kumar A.M., MD   Next Visit   10/9/2024 Funmilayo Kumar A.M., MD   ED visits in past 90 days: 1        Note composed:12:12 PM 09/19/2024

## 2024-10-08 NOTE — PROGRESS NOTES
Subjective:       Patient ID: Omkar Gan is a 49 y.o. female.    Chief Complaint: No chief complaint on file.     This is a 49 y.o.  female patient that is an established patient of mine.    Left stent 6/10/24, required URS/HLL to place stent given impacted stone.  Left URS/HLL/SBE/stent 6/24/24--100% Calcium oxalate monohydrate.   Stent removed 7/2/24.  Doing well.    VINICIO 8/2024: mild left dilation without overt hydronephrosis on my read.  No flank pain.      H/o stones in past but no intervention prior to above.        Lab Results   Component Value Date    CREATININE 0.9 06/19/2024       ---  PMH/PSH/Medications/Allergies/Social history reviewed and as in chart.    Review of Systems   Constitutional:  Negative for activity change, chills, fatigue and fever.   Respiratory:  Negative for cough, chest tightness and shortness of breath.    Cardiovascular:  Negative for chest pain.   Gastrointestinal:  Negative for abdominal distention, abdominal pain, nausea and vomiting.   Genitourinary:  Negative for difficulty urinating, flank pain, hematuria and pelvic pain.   Musculoskeletal:  Negative for back pain and gait problem.       Objective:      Physical Exam  HENT:      Head: Normocephalic.   Pulmonary:      Effort: Pulmonary effort is normal.   Abdominal:      General: Abdomen is flat.   Musculoskeletal:      Cervical back: Normal range of motion.   Skin:     General: Skin is warm.   Neurological:      General: No focal deficit present.      Mental Status: She is alert.           VINICIO 8/2024:    Impression:     Some residual pyramidal dilatation on the left with degree of previous hydronephrosis otherwise improved from most recent comparison 06/19/2024 CT.  Continued attention at follow-up.     Nonobstructing right renal stone.  Assessment:     Left ureteral stone  Kidney stones    Plan:     Doing well, US with out over hydronephrosis on my review, no pain  Discussed metabolic workup.  Wishes to hold at  current.  Follow up in 6 months with VINICIO and KUB with NP.      Pasha Jeffries MD    The above referenced imaging and interpretations were personally reviewed.  Disclaimer: This note has been generated using voice-recognition software. There may be typographical errors that have been missed during proof-reading.

## 2024-10-09 ENCOUNTER — OFFICE VISIT (OUTPATIENT)
Dept: FAMILY MEDICINE | Facility: CLINIC | Age: 49
End: 2024-10-09
Payer: COMMERCIAL

## 2024-10-09 ENCOUNTER — OFFICE VISIT (OUTPATIENT)
Dept: UROLOGY | Facility: CLINIC | Age: 49
End: 2024-10-09
Payer: COMMERCIAL

## 2024-10-09 ENCOUNTER — PATIENT MESSAGE (OUTPATIENT)
Dept: FAMILY MEDICINE | Facility: CLINIC | Age: 49
End: 2024-10-09

## 2024-10-09 VITALS
HEART RATE: 87 BPM | DIASTOLIC BLOOD PRESSURE: 82 MMHG | OXYGEN SATURATION: 98 % | SYSTOLIC BLOOD PRESSURE: 138 MMHG | BODY MASS INDEX: 37.03 KG/M2 | HEIGHT: 66 IN | WEIGHT: 230.38 LBS

## 2024-10-09 VITALS
BODY MASS INDEX: 37.18 KG/M2 | DIASTOLIC BLOOD PRESSURE: 95 MMHG | HEART RATE: 80 BPM | SYSTOLIC BLOOD PRESSURE: 157 MMHG | WEIGHT: 231.38 LBS | HEIGHT: 66 IN

## 2024-10-09 DIAGNOSIS — I10 PRIMARY HYPERTENSION: Primary | ICD-10-CM

## 2024-10-09 DIAGNOSIS — E66.09 CLASS 2 OBESITY DUE TO EXCESS CALORIES WITHOUT SERIOUS COMORBIDITY WITH BODY MASS INDEX (BMI) OF 37.0 TO 37.9 IN ADULT: ICD-10-CM

## 2024-10-09 DIAGNOSIS — Z12.11 SCREENING FOR COLORECTAL CANCER: ICD-10-CM

## 2024-10-09 DIAGNOSIS — Z12.31 BREAST CANCER SCREENING BY MAMMOGRAM: ICD-10-CM

## 2024-10-09 DIAGNOSIS — N20.0 NEPHROLITHIASIS: Chronic | ICD-10-CM

## 2024-10-09 DIAGNOSIS — N20.1 LEFT URETERAL STONE: Primary | ICD-10-CM

## 2024-10-09 DIAGNOSIS — E66.812 CLASS 2 OBESITY DUE TO EXCESS CALORIES WITHOUT SERIOUS COMORBIDITY WITH BODY MASS INDEX (BMI) OF 37.0 TO 37.9 IN ADULT: ICD-10-CM

## 2024-10-09 DIAGNOSIS — Z12.12 SCREENING FOR COLORECTAL CANCER: ICD-10-CM

## 2024-10-09 PROBLEM — R73.03 PREDIABETES: Status: RESOLVED | Noted: 2023-09-13 | Resolved: 2024-10-09

## 2024-10-09 PROBLEM — E66.01 MORBID OBESITY WITH BODY MASS INDEX (BMI) OF 40.0 TO 44.9 IN ADULT: Status: RESOLVED | Noted: 2023-09-05 | Resolved: 2024-10-09

## 2024-10-09 PROCEDURE — 3075F SYST BP GE 130 - 139MM HG: CPT | Mod: CPTII,S$GLB,, | Performed by: FAMILY MEDICINE

## 2024-10-09 PROCEDURE — 99999 PR PBB SHADOW E&M-EST. PATIENT-LVL IV: CPT | Mod: PBBFAC,,, | Performed by: FAMILY MEDICINE

## 2024-10-09 PROCEDURE — 3077F SYST BP >= 140 MM HG: CPT | Mod: CPTII,S$GLB,, | Performed by: UROLOGY

## 2024-10-09 PROCEDURE — 3008F BODY MASS INDEX DOCD: CPT | Mod: CPTII,S$GLB,, | Performed by: FAMILY MEDICINE

## 2024-10-09 PROCEDURE — 1160F RVW MEDS BY RX/DR IN RCRD: CPT | Mod: CPTII,S$GLB,, | Performed by: FAMILY MEDICINE

## 2024-10-09 PROCEDURE — 3008F BODY MASS INDEX DOCD: CPT | Mod: CPTII,S$GLB,, | Performed by: UROLOGY

## 2024-10-09 PROCEDURE — 3080F DIAST BP >= 90 MM HG: CPT | Mod: CPTII,S$GLB,, | Performed by: UROLOGY

## 2024-10-09 PROCEDURE — 99213 OFFICE O/P EST LOW 20 MIN: CPT | Mod: S$GLB,,, | Performed by: UROLOGY

## 2024-10-09 PROCEDURE — 1159F MED LIST DOCD IN RCRD: CPT | Mod: CPTII,S$GLB,, | Performed by: FAMILY MEDICINE

## 2024-10-09 PROCEDURE — 3079F DIAST BP 80-89 MM HG: CPT | Mod: CPTII,S$GLB,, | Performed by: FAMILY MEDICINE

## 2024-10-09 PROCEDURE — 1160F RVW MEDS BY RX/DR IN RCRD: CPT | Mod: CPTII,S$GLB,, | Performed by: UROLOGY

## 2024-10-09 PROCEDURE — 99999 PR PBB SHADOW E&M-EST. PATIENT-LVL III: CPT | Mod: PBBFAC,,, | Performed by: UROLOGY

## 2024-10-09 PROCEDURE — 1159F MED LIST DOCD IN RCRD: CPT | Mod: CPTII,S$GLB,, | Performed by: UROLOGY

## 2024-10-09 PROCEDURE — 99215 OFFICE O/P EST HI 40 MIN: CPT | Mod: S$GLB,,, | Performed by: FAMILY MEDICINE

## 2024-10-09 RX ORDER — VALSARTAN AND HYDROCHLOROTHIAZIDE 80; 12.5 MG/1; MG/1
1 TABLET, FILM COATED ORAL DAILY
Qty: 90 TABLET | Refills: 3 | Status: SHIPPED | OUTPATIENT
Start: 2024-10-09 | End: 2025-10-09

## 2024-10-09 NOTE — PROGRESS NOTES
Subjective:       Patient ID: Omkar Gan is a 49 y.o. female.    Chief Complaint: Follow-up  50 yo female with obesity, nephrolithiasis followed by Urology and chronic left knee pain presents for f/u of elevated BP readings. A review of patient's flowsheet shows elevated BP readings since 2020.  Follow-up  Pertinent negatives include no chest pain, chills, fever, headaches or neck pain.   Hypertension  This is a new problem. The problem is uncontrolled. Pertinent negatives include no blurred vision, chest pain, headaches, neck pain, orthopnea, palpitations, peripheral edema, PND or shortness of breath. Risk factors for coronary artery disease include obesity. Past treatments include lifestyle changes.     Review of Systems   Constitutional:  Negative for chills and fever.   Eyes:  Negative for blurred vision and visual disturbance.   Respiratory:  Negative for shortness of breath.    Cardiovascular:  Negative for chest pain, palpitations, orthopnea, leg swelling and PND.   Musculoskeletal:  Negative for neck pain.   Neurological:  Negative for headaches.       Objective:      Physical Exam  Vitals reviewed.   Constitutional:       General: She is not in acute distress.     Appearance: Normal appearance. She is obese. She is not ill-appearing.   HENT:      Head: Normocephalic and atraumatic.   Eyes:      General:         Right eye: No discharge.         Left eye: No discharge.      Extraocular Movements: Extraocular movements intact.      Conjunctiva/sclera: Conjunctivae normal.   Neck:      Vascular: No carotid bruit.   Cardiovascular:      Rate and Rhythm: Normal rate and regular rhythm.      Pulses: Normal pulses.      Heart sounds: Normal heart sounds. No murmur heard.     No gallop.   Pulmonary:      Effort: Pulmonary effort is normal.      Breath sounds: Normal breath sounds. No wheezing or rales.   Abdominal:      General: Bowel sounds are normal.      Palpations: Abdomen is soft. There is no mass.       Tenderness: There is no abdominal tenderness.   Musculoskeletal:      Cervical back: Normal range of motion and neck supple. No rigidity or tenderness.      Right lower leg: No edema.      Left lower leg: No edema.   Skin:     General: Skin is warm and dry.   Neurological:      Mental Status: She is alert and oriented to person, place, and time.   Psychiatric:         Mood and Affect: Mood normal.         Assessment:         See plan  Plan:       Primary hypertension New diagnosis  -     IN OFFICE EKG 12-LEAD (to Muse)  -     Discontinue: tirzepatide 5 mg/0.5 mL PnIj; Inject 5 mg into the skin every 7 days.  Dispense: 2 mL; Refill: 3  -     tirzepatide 5 mg/0.5 mL PnIj; Inject 5 mg into the skin every 7 days.  Dispense: 2 mL; Refill: 3 for weight loss  -     Start valsartan-hydrochlorothiazide (DIOVAN-HCT) 80-12.5 mg per tablet; Take 1 tablet by mouth once daily.  Dispense: 90 tablet; Refill: 3  -     Comprehensive Metabolic Panel; Future; Expected date: 11/09/2024  -     TSH; Future; Expected date: 11/09/2024  -     Lipid Panel; Future; Expected date: 11/09/2024  - Low sodium diet advised.    Class 2 obesity due to excess calories without serious comorbidity with body mass index (BMI) of 37.0 to 37.9 in adult  -     Discontinue: tirzepatide 5 mg/0.5 mL PnIj; Inject 5 mg into the skin every 7 days.  Dispense: 2 mL; Refill: 3  -     tirzepatide 5 mg/0.5 mL PnIj; Inject 5 mg into the skin every 7 days.  Dispense: 2 mL; Refill: 3    Nephrolithiasis: Stable  - F/U with Urology.    Screening for colorectal cancer  -     Fecal Immunochemical Test (iFOBT); Future; Expected date: 10/09/2024    Breast cancer screening by mammogram  -     Mammo Digital Screening Bilat w/ Dada; Future; Expected date: 10/09/2024      F/U in 4 weeks to recheck HTN.

## 2024-10-21 ENCOUNTER — PATIENT MESSAGE (OUTPATIENT)
Dept: ADMINISTRATIVE | Facility: HOSPITAL | Age: 49
End: 2024-10-21
Payer: COMMERCIAL

## 2024-10-28 RX ORDER — MELOXICAM 15 MG/1
15 TABLET ORAL
Qty: 30 TABLET | Refills: 0 | Status: SHIPPED | OUTPATIENT
Start: 2024-10-28

## 2024-11-25 DIAGNOSIS — M25.561 PAIN IN BOTH KNEES, UNSPECIFIED CHRONICITY: Primary | ICD-10-CM

## 2024-11-25 DIAGNOSIS — M25.562 PAIN IN BOTH KNEES, UNSPECIFIED CHRONICITY: Primary | ICD-10-CM

## 2024-11-27 ENCOUNTER — OFFICE VISIT (OUTPATIENT)
Dept: ORTHOPEDICS | Facility: CLINIC | Age: 49
End: 2024-11-27
Payer: COMMERCIAL

## 2024-11-27 ENCOUNTER — HOSPITAL ENCOUNTER (OUTPATIENT)
Dept: RADIOLOGY | Facility: HOSPITAL | Age: 49
Discharge: HOME | End: 2024-11-27
Attending: ORTHOPAEDIC SURGERY
Payer: COMMERCIAL

## 2024-11-27 VITALS — HEIGHT: 66 IN | BODY MASS INDEX: 37.03 KG/M2 | WEIGHT: 230.38 LBS

## 2024-11-27 DIAGNOSIS — M17.12 PRIMARY OSTEOARTHRITIS OF LEFT KNEE: Primary | ICD-10-CM

## 2024-11-27 DIAGNOSIS — M25.561 PAIN IN BOTH KNEES, UNSPECIFIED CHRONICITY: ICD-10-CM

## 2024-11-27 DIAGNOSIS — M25.562 PAIN IN BOTH KNEES, UNSPECIFIED CHRONICITY: ICD-10-CM

## 2024-11-27 PROCEDURE — 99213 OFFICE O/P EST LOW 20 MIN: CPT | Mod: 25,S$GLB,, | Performed by: ORTHOPAEDIC SURGERY

## 2024-11-27 PROCEDURE — 1160F RVW MEDS BY RX/DR IN RCRD: CPT | Mod: CPTII,S$GLB,, | Performed by: ORTHOPAEDIC SURGERY

## 2024-11-27 PROCEDURE — 1159F MED LIST DOCD IN RCRD: CPT | Mod: CPTII,S$GLB,, | Performed by: ORTHOPAEDIC SURGERY

## 2024-11-27 PROCEDURE — 3008F BODY MASS INDEX DOCD: CPT | Mod: CPTII,S$GLB,, | Performed by: ORTHOPAEDIC SURGERY

## 2024-11-27 PROCEDURE — 99999 PR PBB SHADOW E&M-EST. PATIENT-LVL III: CPT | Mod: PBBFAC,,, | Performed by: ORTHOPAEDIC SURGERY

## 2024-11-27 PROCEDURE — 73564 X-RAY EXAM KNEE 4 OR MORE: CPT | Mod: TC,50

## 2024-11-27 PROCEDURE — 20610 DRAIN/INJ JOINT/BURSA W/O US: CPT | Mod: LT,S$GLB,, | Performed by: ORTHOPAEDIC SURGERY

## 2024-11-27 RX ADMIN — TRIAMCINOLONE ACETONIDE 40 MG: 40 INJECTION, SUSPENSION INTRA-ARTICULAR; INTRAMUSCULAR at 01:11

## 2024-12-02 NOTE — PROGRESS NOTES
Subjective:      Patient ID: Omakr Gan is a 49 y.o. female.    Chief Complaint:   Pain of the Left Knee and Pain of the Right Knee    History of Present Illness    CHIEF COMPLAINT:  Patient presents today for follow-up evaluation of bilateral knee pain due to osteoarthritis, with the left knee being more symptomatic.    HPI:  Patient presents for evaluation of ongoing knee pain. Her knees are in the same condition as before and states she is unable to perform certain movements due to the pain. Despite the pain, she is still working, though she describes it as challenging but necessary. The left knee has been particularly problematic. She received a cortisone injection in the left knee previously, which provided relief for several weeks. She states the relief lasted until approximately two weeks ago. 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    A few nights ago, she experienced a popping sensation in her right knee while stretching in bed. She is considering getting a new cushion for her bed and has already turned her mattress. In June, she was hospitalized for kidney stones and was prescribed tramadol, which she rarely takes unless in severe pain. She recently took one of her remaining tramadol pills for knee pain relief. She has also been using OTC medications such as Tylenol Arthritis and Aleve Nighttime. To manage the pain, she has been applying ice packs to her knees and using a knee massage device that can provide both heat and cold therapy.    She denies any recent trauma or injury to the knees.    MEDICATIONS:  Patient was prescribed Tramadol for kidney stones in June and recently took one pill for knee pain relief. She is also on Tylenol Arthritis and Aleve Nighttime.    WORK STATUS:  Patient works as a , a job that involves physical activities such as climbing ladders on  "trailers. She reports that working is challenging due to knee pain, which affects her ability to perform certain job-related tasks. The pain has been interfering with her work activities for at least two weeks. Despite these challenges, she continues to work out of necessity.        Objective:        Ortho/SPM Exam  Physical Exam    MSK: Knee - Left: Left knee ROM: 0 to 115 degrees.  MSK: Knee - Right: Right knee ROM: 0 to 120 degrees.  IMAGING:  Patient underwent X-rays of both knees, which revealed reduced joint space, indicating advanced osteoarthritis. The cartilage between the bones has worn away significantly, approaching but not quite at "bone on bone" status.              IMAGING:  Assessment/Plan   There are no diagnoses linked to this encounter.   Assessment & Plan    M17.9 Osteoarthritis of knee, unspecified  M25.561 Pain in right knee  M25.562 Pain in left knee  Z96.651 Presence of right artificial knee joint  Z96.652 Presence of left artificial knee joint  Z79.891 Long term (current) use of opiate analgesic    Reviewed X-rays with patient, explaining the reduced joint space indicative of cartilage wear in both knees.  Discussed and agreed to perform cortisone injections on both knees.  Explained the need for injections due to advanced osteoarthritis visible on X-rays.  Patient expressed preference for injections over surgery at this time.  Continue working, though it is a struggle for the patient.            The patient's pathophysiology was explained in detail with reference to x-rays, models, other visual aids as appropriate.  Treatment options were discussed in detail.  Questions were invited and answered to the patient's satisfaction.    This note was generated with the assistance of ambient listening technology. Verbal consent was obtained by the patient and accompanying visitor(s) for the recording of patient appointment to facilitate this note. I attest to having reviewed and edited the generated " note for accuracy, though some syntax or spelling errors may persist. Please contact the author of this note for any clarification.       Soy De Leon MD  Orthopedic Surgery

## 2024-12-03 RX ORDER — TRIAMCINOLONE ACETONIDE 40 MG/ML
40 INJECTION, SUSPENSION INTRA-ARTICULAR; INTRAMUSCULAR
Status: DISCONTINUED | OUTPATIENT
Start: 2024-11-27 | End: 2024-12-03 | Stop reason: HOSPADM

## 2024-12-03 NOTE — PROGRESS NOTES
Subjective:      Patient ID: Omkar Gan is a 49 y.o. female.    Chief Complaint:   Pain of the Left Knee and Pain of the Right Knee    History of Present Illness    CHIEF COMPLAINT:  Patient presents today for follow-up evaluation of bilateral knee pain due to osteoarthritis, with the left knee being more symptomatic.    HPI:  Patient presents for evaluation of ongoing knee pain. Her knees are in the same condition as before and states she is unable to perform certain movements due to the pain. Despite the pain, she is still working, though she describes it as challenging but necessary. The left knee has been particularly problematic. She received a cortisone injection in the left knee previously, which provided relief for several weeks. She states the relief lasted until approximately two weeks ago. 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    A few nights ago, she experienced a popping sensation in her right knee while stretching in bed. She is considering getting a new cushion for her bed and has already turned her mattress. In June, she was hospitalized for kidney stones and was prescribed tramadol, which she rarely takes unless in severe pain. She recently took one of her remaining tramadol pills for knee pain relief. She has also been using OTC medications such as Tylenol Arthritis and Aleve Nighttime. To manage the pain, she has been applying ice packs to her knees and using a knee massage device that can provide both heat and cold therapy.    She denies any recent trauma or injury to the knees.    MEDICATIONS:  Patient was prescribed Tramadol for kidney stones in June and recently took one pill for knee pain relief. She is also on Tylenol Arthritis and Aleve Nighttime.    WORK STATUS:  Patient works as a , a job that involves physical activities such as climbing ladders on  "trailers. She reports that working is challenging due to knee pain, which affects her ability to perform certain job-related tasks. The pain has been interfering with her work activities for at least two weeks. Despite these challenges, she continues to work out of necessity.        Objective:        Ortho/SPM Exam  Physical Exam    MSK: Knee - Left: Left knee ROM: 0 to 115 degrees.  MSK: Knee - Right: Right knee ROM: 0 to 120 degrees.  Partially correctable varus deformity bilaterally.  Boggy synovitis without effusion.  Negative Stinchfield.      IMAGING:Patient underwent X-rays of both knees, which revealed reduced joint space, indicating advanced osteoarthritis.  Kellgren-Mahesh grade 3.  The cartilage between the bones has worn away significantly, approaching but not quite at "bone on bone" status.   Assessment/Plan   DJD, knees    Reviewed X-rays with patient, explaining the reduced joint space indicative of cartilage wear in both knees.  Discussed and agreed to perform cortisone injections on both knees.  She had pain with the left knee injection, and decided to defer the right knee.  She can call us and come back and later if she wants to try it.  Continue working, though it is a struggle for the patient.            The patient's pathophysiology was explained in detail with reference to x-rays, models, other visual aids as appropriate.  Treatment options were discussed in detail.  Questions were invited and answered to the patient's satisfaction.    This note was generated with the assistance of ambient listening technology. Verbal consent was obtained by the patient and accompanying visitor(s) for the recording of patient appointment to facilitate this note. I attest to having reviewed and edited the generated note for accuracy, though some syntax or spelling errors may persist. Please contact the author of this note for any clarification.       Soy De Leon MD  Orthopedic Surgery  "

## 2024-12-03 NOTE — PROCEDURES
Large Joint Aspiration/Injection: L knee    Date/Time: 11/27/2024 1:00 PM    Performed by: Soy De Leon MD  Authorized by: Soy De Leon MD    Consent Done?:  Yes (Verbal)  Indications:  Pain and arthritis  Site marked: the procedure site was marked    Timeout: prior to procedure the correct patient, procedure, and site was verified    Prep: patient was prepped and draped in usual sterile fashion      Local anesthesia used?: Yes    Local anesthetic:  Lidocaine 1% without epinephrine  Anesthetic total (ml):  5      Details:  Needle Size:  25 G  Ultrasonic Guidance for needle placement?: No    Approach:  Anterolateral  Location:  Knee  Site:  L knee  Medications:  40 mg triamcinolone acetonide 40 mg/mL  Patient tolerance:  Patient tolerated the procedure well with no immediate complications

## 2024-12-05 DIAGNOSIS — K59.03 DRUG-INDUCED CONSTIPATION: ICD-10-CM

## 2024-12-05 RX ORDER — MELOXICAM 15 MG/1
15 TABLET ORAL DAILY PRN
Qty: 30 TABLET | Refills: 0 | Status: SHIPPED | OUTPATIENT
Start: 2024-12-05

## 2024-12-05 NOTE — TELEPHONE ENCOUNTER
No care due was identified.  [facility] Embedded Care Due Messages. Reference number: [id number].   12/05/2024 8:57:10 AM CST

## 2024-12-06 RX ORDER — LACTULOSE 10 G/15ML
10 SOLUTION ORAL; RECTAL 2 TIMES DAILY
Qty: 630 ML | Refills: 0 | Status: SHIPPED | OUTPATIENT
Start: 2024-12-06

## 2025-01-11 DIAGNOSIS — M71.22 SYNOVIAL CYST OF LEFT POPLITEAL SPACE: ICD-10-CM

## 2025-01-11 DIAGNOSIS — M25.562 CHRONIC PAIN OF LEFT KNEE: ICD-10-CM

## 2025-01-11 DIAGNOSIS — G89.29 CHRONIC PAIN OF LEFT KNEE: ICD-10-CM

## 2025-01-11 DIAGNOSIS — K59.03 DRUG-INDUCED CONSTIPATION: ICD-10-CM

## 2025-01-11 NOTE — TELEPHONE ENCOUNTER
No care due was identified.  [facility] Embedded Care Due Messages. Reference number: [id number].   1/11/2025 11:58:20 AM CST

## 2025-01-13 ENCOUNTER — TELEPHONE (OUTPATIENT)
Dept: ORTHOPEDICS | Facility: CLINIC | Age: 50
End: 2025-01-13
Payer: COMMERCIAL

## 2025-01-13 ENCOUNTER — TELEPHONE (OUTPATIENT)
Dept: FAMILY MEDICINE | Facility: CLINIC | Age: 50
End: 2025-01-13
Payer: COMMERCIAL

## 2025-01-13 RX ORDER — TRAMADOL HYDROCHLORIDE 50 MG/1
50 TABLET ORAL EVERY 8 HOURS PRN
Qty: 21 TABLET | Refills: 0 | OUTPATIENT
Start: 2025-01-13

## 2025-01-13 RX ORDER — LACTULOSE 10 G/15ML
10 SOLUTION ORAL; RECTAL 2 TIMES DAILY
Qty: 630 ML | Refills: 0 | Status: SHIPPED | OUTPATIENT
Start: 2025-01-13

## 2025-01-13 NOTE — TELEPHONE ENCOUNTER
Please call pt to schedule a f/u appt within 4-6 weeks. Pt last seen October 2024 and was to f/u HTN in November 2024.

## 2025-01-13 NOTE — TELEPHONE ENCOUNTER
Pt informed that Dr. De Leon's last day was 01/10/2025. Pt accepted being scheduled with another provider. Appointment scheduled.

## 2025-01-20 RX ORDER — MELOXICAM 15 MG/1
15 TABLET ORAL DAILY PRN
Qty: 30 TABLET | Refills: 0 | OUTPATIENT
Start: 2025-01-20

## 2025-01-20 NOTE — TELEPHONE ENCOUNTER
Called and rescheduled patient's appt with Umair due to clinic weather closure. Patient accepted first offered appointment for next week.

## 2025-01-22 ENCOUNTER — PATIENT OUTREACH (OUTPATIENT)
Dept: ADMINISTRATIVE | Facility: HOSPITAL | Age: 50
End: 2025-01-22
Payer: COMMERCIAL

## 2025-01-22 NOTE — PROGRESS NOTES
01/22/2025  VB chart audit performed. Care Everywhere updates requested and reviewed  Overdue HM topic chart audit and/or requested. LINKS triggered and reconciled. Media reviewed Lvm/portal sent regarding overdue health topics

## 2025-02-15 DIAGNOSIS — I10 PRIMARY HYPERTENSION: ICD-10-CM

## 2025-02-15 NOTE — TELEPHONE ENCOUNTER
Care Due:                  Date            Visit Type   Department     Provider  --------------------------------------------------------------------------------                                EP -                              PRIMARY      [facility]  Last Visit: 10-      CARE ([facility])   MEDICINE       Funmilayo Kumar  Next Visit: None Scheduled  None         None Found                                                            Last  Test          Frequency    Reason                     Performed    Due Date  --------------------------------------------------------------------------------    HBA1C.......  6 months...  tirzepatide..............  08- 02-    [facility] Embedded Care Due Messages. Reference number: [id number].   2/15/2025 4:24:42 PM CST

## 2025-02-17 RX ORDER — VALSARTAN AND HYDROCHLOROTHIAZIDE 80; 12.5 MG/1; MG/1
1 TABLET, FILM COATED ORAL DAILY
Qty: 90 TABLET | Refills: 1 | Status: SHIPPED | OUTPATIENT
Start: 2025-02-17 | End: 2025-02-18

## 2025-02-17 NOTE — TELEPHONE ENCOUNTER
Provider Staff:  Action required for this patient    Requires labs      Please see care gap opportunities below in Care Due Message.    Thanks!  [facility]     Appointments      Date Provider   Last Visit   10/9/2024 Funmilayo Kumar A.M., MD   Next Visit   2/18/2025 Funmilayo Kumar A.M., MD     Refill Decision Note   Omkar Gan  is requesting a refill authorization.  Brief Assessment and Rationale for Refill:  Approve     Medication Therapy Plan:         Comments:     Note composed:2:04 PM 02/17/2025

## 2025-02-18 ENCOUNTER — OFFICE VISIT (OUTPATIENT)
Dept: FAMILY MEDICINE | Facility: CLINIC | Age: 50
End: 2025-02-18
Payer: COMMERCIAL

## 2025-02-18 ENCOUNTER — LAB VISIT (OUTPATIENT)
Dept: LAB | Facility: HOSPITAL | Age: 50
End: 2025-02-18
Attending: FAMILY MEDICINE
Payer: COMMERCIAL

## 2025-02-18 ENCOUNTER — OFFICE VISIT (OUTPATIENT)
Dept: ORTHOPEDICS | Facility: CLINIC | Age: 50
End: 2025-02-18
Payer: COMMERCIAL

## 2025-02-18 VITALS
BODY MASS INDEX: 37.74 KG/M2 | WEIGHT: 234.81 LBS | HEIGHT: 66 IN | SYSTOLIC BLOOD PRESSURE: 142 MMHG | DIASTOLIC BLOOD PRESSURE: 86 MMHG

## 2025-02-18 VITALS — WEIGHT: 234.81 LBS | HEIGHT: 66 IN | BODY MASS INDEX: 37.74 KG/M2

## 2025-02-18 DIAGNOSIS — I10 PRIMARY HYPERTENSION: ICD-10-CM

## 2025-02-18 DIAGNOSIS — E66.812 CLASS 2 SEVERE OBESITY DUE TO EXCESS CALORIES WITH SERIOUS COMORBIDITY AND BODY MASS INDEX (BMI) OF 37.0 TO 37.9 IN ADULT: ICD-10-CM

## 2025-02-18 DIAGNOSIS — E66.01 CLASS 2 SEVERE OBESITY DUE TO EXCESS CALORIES WITH SERIOUS COMORBIDITY AND BODY MASS INDEX (BMI) OF 37.0 TO 37.9 IN ADULT: ICD-10-CM

## 2025-02-18 DIAGNOSIS — M17.0 PRIMARY OSTEOARTHRITIS OF BOTH KNEES: Primary | ICD-10-CM

## 2025-02-18 DIAGNOSIS — M17.12 PRIMARY OSTEOARTHRITIS OF LEFT KNEE: ICD-10-CM

## 2025-02-18 DIAGNOSIS — I10 PRIMARY HYPERTENSION: Primary | ICD-10-CM

## 2025-02-18 DIAGNOSIS — Z97.3 WEARS GLASSES: ICD-10-CM

## 2025-02-18 LAB
ALBUMIN SERPL BCP-MCNC: 4 G/DL (ref 3.5–5.2)
ALP SERPL-CCNC: 75 U/L (ref 40–150)
ALT SERPL W/O P-5'-P-CCNC: 14 U/L (ref 10–44)
ANION GAP SERPL CALC-SCNC: 9 MMOL/L (ref 8–16)
AST SERPL-CCNC: 39 U/L (ref 10–40)
BASOPHILS # BLD AUTO: 0.07 K/UL (ref 0–0.2)
BASOPHILS NFR BLD: 0.7 % (ref 0–1.9)
BILIRUB SERPL-MCNC: 0.4 MG/DL (ref 0.1–1)
BUN SERPL-MCNC: 12 MG/DL (ref 6–20)
CALCIUM SERPL-MCNC: 9.9 MG/DL (ref 8.7–10.5)
CHLORIDE SERPL-SCNC: 106 MMOL/L (ref 95–110)
CO2 SERPL-SCNC: 26 MMOL/L (ref 23–29)
CREAT SERPL-MCNC: 0.7 MG/DL (ref 0.5–1.4)
DIFFERENTIAL METHOD BLD: ABNORMAL
EOSINOPHIL # BLD AUTO: 0.5 K/UL (ref 0–0.5)
EOSINOPHIL NFR BLD: 4.7 % (ref 0–8)
ERYTHROCYTE [DISTWIDTH] IN BLOOD BY AUTOMATED COUNT: 13.2 % (ref 11.5–14.5)
EST. GFR  (NO RACE VARIABLE): >60 ML/MIN/1.73 M^2
ESTIMATED AVG GLUCOSE: 120 MG/DL (ref 68–131)
GLUCOSE SERPL-MCNC: 81 MG/DL (ref 70–110)
HBA1C MFR BLD: 5.8 % (ref 4–5.6)
HCT VFR BLD AUTO: 37.3 % (ref 37–48.5)
HGB BLD-MCNC: 12 G/DL (ref 12–16)
IMM GRANULOCYTES # BLD AUTO: 0.04 K/UL (ref 0–0.04)
IMM GRANULOCYTES NFR BLD AUTO: 0.4 % (ref 0–0.5)
LYMPHOCYTES # BLD AUTO: 2.3 K/UL (ref 1–4.8)
LYMPHOCYTES NFR BLD: 24.2 % (ref 18–48)
MCH RBC QN AUTO: 31 PG (ref 27–31)
MCHC RBC AUTO-ENTMCNC: 32.2 G/DL (ref 32–36)
MCV RBC AUTO: 96 FL (ref 82–98)
MONOCYTES # BLD AUTO: 0.6 K/UL (ref 0.3–1)
MONOCYTES NFR BLD: 6.2 % (ref 4–15)
NEUTROPHILS # BLD AUTO: 6 K/UL (ref 1.8–7.7)
NEUTROPHILS NFR BLD: 63.8 % (ref 38–73)
NRBC BLD-RTO: 0 /100 WBC
PLATELET # BLD AUTO: 257 K/UL (ref 150–450)
PMV BLD AUTO: 10.6 FL (ref 9.2–12.9)
POTASSIUM SERPL-SCNC: 3.5 MMOL/L (ref 3.5–5.1)
PROT SERPL-MCNC: 7.6 G/DL (ref 6–8.4)
RBC # BLD AUTO: 3.87 M/UL (ref 4–5.4)
SODIUM SERPL-SCNC: 141 MMOL/L (ref 136–145)
WBC # BLD AUTO: 9.48 K/UL (ref 3.9–12.7)

## 2025-02-18 PROCEDURE — 83036 HEMOGLOBIN GLYCOSYLATED A1C: CPT | Performed by: FAMILY MEDICINE

## 2025-02-18 PROCEDURE — 80053 COMPREHEN METABOLIC PANEL: CPT | Performed by: FAMILY MEDICINE

## 2025-02-18 PROCEDURE — 85025 COMPLETE CBC W/AUTO DIFF WBC: CPT | Performed by: FAMILY MEDICINE

## 2025-02-18 PROCEDURE — 36415 COLL VENOUS BLD VENIPUNCTURE: CPT | Mod: PO | Performed by: FAMILY MEDICINE

## 2025-02-18 RX ORDER — MELOXICAM 15 MG/1
15 TABLET ORAL DAILY PRN
Qty: 30 TABLET | Refills: 0 | Status: SHIPPED | OUTPATIENT
Start: 2025-02-18

## 2025-02-18 RX ORDER — TRIAMCINOLONE ACETONIDE 40 MG/ML
80 INJECTION, SUSPENSION INTRA-ARTICULAR; INTRAMUSCULAR ONCE
Status: COMPLETED | OUTPATIENT
Start: 2025-02-18 | End: 2025-02-18

## 2025-02-18 RX ORDER — VALSARTAN AND HYDROCHLOROTHIAZIDE 80; 12.5 MG/1; MG/1
2 TABLET, FILM COATED ORAL DAILY
Start: 2025-02-18 | End: 2025-05-19

## 2025-02-18 RX ADMIN — TRIAMCINOLONE ACETONIDE 80 MG: 40 INJECTION, SUSPENSION INTRA-ARTICULAR; INTRAMUSCULAR at 01:02

## 2025-02-18 NOTE — PROGRESS NOTES
Omkar Gan is a 49 y.o. female with known history of bilateral knee osteoarthritis being managed conservatively with intermittent intraarticular corticosteroid injections with the last of which performed by Dr. De Leon on 11/27/2024. She presents today to repeat these injections. No interval changes to history/physical.     Knee Injection Procedure Note  Diagnosis: bilateral knee degenerative arthritis  Indications: bilateral knee pain  Procedure Details: Verbal consent was obtained for the procedure. The injection site was identified and the skin was prepared with alcohol. The bilateral knee was injected from an anterolateral approach with 1 ml of Kenalog and 4 ml Lidocaine under sterile technique using a 22 gauge needle. The needle was removed and the area cleansed and dressed.  Complications:  Patient tolerated the procedure well.    she was advised to rest the knee today, using ice and elevation as needed for comfort and swelling.Immediate relief of the knee pain may be short lived and secondary to the lidocaine. she may have an increase in discomfort tonight followed by steady improvement over the next several days. It may take 1-2 weeks following the injection to get the full benefit of the medication.     - Refill Mobic.   - She may be interested in viscosupplementation injections if no relief from today's injections.

## 2025-02-21 ENCOUNTER — RESULTS FOLLOW-UP (OUTPATIENT)
Dept: FAMILY MEDICINE | Facility: CLINIC | Age: 50
End: 2025-02-21

## 2025-02-21 NOTE — PROGRESS NOTES
Subjective:       Patient ID: Omkar Gan is a 49 y.o. female.    Chief Complaint: Hypertension    History of Present Illness    CHIEF COMPLAINT:  Omkar presents today for follow-up of hypertension.    HYPERTENSION:  She reports generally normal home blood pressure readings with occasional elevations during periods of pain or increased physical activity. She continues valsartan hydrochlorothiazide 80/12.5 mg tablets for management.    MUSCULOSKELETAL:  She has a bone-on-bone condition in her left knee. She received a knee injection in October/November which only provided relief for approximately one month. The knee pain has significantly impacted her ability to exercise, causing her to reduce her gym attendance.    WEIGHT MANAGEMENT:  She reports previous weight loss success, having decreased from 260 lbs to her current weight of 229 lbs. Her diet consists of light breakfast (grapefruit, boiled egg, or oatmeal), sometimes skips lunch due to work, and evening meals include salad with salmon while avoiding fried foods.    GI:  She takes lactulose occasionally for bowel management.    VISION:  She wears glasses with last eye exam in June. She reports blurry vision and believes she needs an updated prescription.      ROS:  Eyes: +vision changes  Musculoskeletal: +joint pain          Objective:      Physical Exam    General: In no acute distress.  Head: Normocephalic. Non traumatic.  Eyes:  EOMs full. Conjunctivae clear.   Neck: Supple. No masses. No thyromegaly. No bruits.  Chest: Lungs clear. No rales. No rhonchi. No wheezes.  Heart: RRR. No murmurs. No rubs. No gallops.  Abdomen: Soft. No tenderness. No masses. BS normal.  Neuro: No focal deficits appreciated. Good muscle tone. Normal response to visual stimuli. Normal response to auditory stimuli.  Skin: Normal. No rashes. No lesions noted.  Vitals: BLOOD PRESSURE /86. BLOOD PRESSURE /90.            Assessment:         Primary hypertension  -      valsartan-hydrochlorothiazide (DIOVAN-HCT) 80-12.5 mg per tablet; Take 2 tablets by mouth once daily.  -     Hemoglobin A1C; Future; Expected date: 02/18/2025  -     CBC Auto Differential; Future; Expected date: 02/18/2025  -     Comprehensive Metabolic Panel; Future; Expected date: 02/18/2025    Class 2 severe obesity due to excess calories with serious comorbidity and body mass index (BMI) of 37.0 to 37.9 in adult  -     Ambulatory referral/consult to Bariatric/Obesity Medicine; Future; Expected date: 02/25/2025  -     Hemoglobin A1C; Future; Expected date: 02/18/2025    Wears glasses    Primary osteoarthritis of left knee         Plan:       Assessment & Plan    HYPERTENSION:  - Assessed the patient's hypertension: blood pressure uncontrolled at 142/86, rechecked 136/90.  - Determined current valsartan hydrochlorothiazide dosage insufficient for blood pressure control.  - Considered the patient's weight and obesity as factors affecting hypertension management.  - Evaluated for cardiovascular symptoms: negative for chest pain, dyspnea, palpitations, orthopnea, blurred vision, and headaches.  - Instructed the patient to check blood pressure 2-3 times per week, record readings, and take immediately before virtual follow-up visit.  - Increased valsartan hydrochlorothiazide from 80/12.5 mg to 160/25 mg daily (by taking 2 80/12.5 mg tablets).  - Scheduled virtual follow up in 4 weeks to reassess blood pressure control.    OSTEOARTHRITIS:  - Evaluated the patient's severe left knee osteoarthritis, affecting ability to exercise and maintain weight loss efforts.  - Noted patient's description of 'bone on bone' condition in the left knee, with previous injection providing only short-term relief.  -  patient to see a  physician for knee assessment, with potential qualification for viscosupplementation or cartilage injections.  - Advised the patient to hold off on gym activities as per previous physician's (Dr. Estrada)  recommendation.    CONSTIPATION:  - Inquired about changes in bowel habits.  - Noted patient's occasional use of lactulose for constipation management.    WEIGHT MANAGEMENT:  - Referred to bariatric medicine team for structured weight loss approach.    PATIENT REFUSAL OF VACCINATION:  - Cendria declined all vaccines.    CANCER SCREENING:  - Cendria declined colon and breast cancer screening.  - Educated on importance of early detection in breast and colon cancer screening.  - Explained rationale for cancer screenings: better outcomes when caught early, before symptoms appear.  - Discussed [place]'s elevated colon cancer rates, increasing incidence in those under 50.    LABS:  - Ordered CBC, liver, and kidney function tests.  - Hemoglobin A1c and blood sugar tests ordered.    DIABETES SCREENING:  - Evaluated for potential diabetes/pre-diabetes given obesity.              This note was generated with the assistance of ambient listening technology. Verbal consent was obtained by the patient and accompanying visitor(s) for the recording of patient appointment to facilitate this note. I attest to having reviewed and edited the generated note for accuracy, though some syntax or spelling errors may persist. Please contact the author of this note for any clarification.

## 2025-03-15 ENCOUNTER — HOSPITAL ENCOUNTER (EMERGENCY)
Facility: HOSPITAL | Age: 50
Discharge: HOME | End: 2025-03-15
Attending: STUDENT IN AN ORGANIZED HEALTH CARE EDUCATION/TRAINING PROGRAM
Payer: COMMERCIAL

## 2025-03-15 VITALS
TEMPERATURE: 98 F | WEIGHT: 215 LBS | SYSTOLIC BLOOD PRESSURE: 113 MMHG | BODY MASS INDEX: 34.55 KG/M2 | HEIGHT: 66 IN | RESPIRATION RATE: 17 BRPM | OXYGEN SATURATION: 99 % | DIASTOLIC BLOOD PRESSURE: 71 MMHG | HEART RATE: 61 BPM

## 2025-03-15 DIAGNOSIS — N23 RENAL COLIC: Primary | ICD-10-CM

## 2025-03-15 DIAGNOSIS — R31.9 URINARY TRACT INFECTION WITH HEMATURIA, SITE UNSPECIFIED: ICD-10-CM

## 2025-03-15 DIAGNOSIS — N39.0 URINARY TRACT INFECTION WITH HEMATURIA, SITE UNSPECIFIED: ICD-10-CM

## 2025-03-15 LAB
ALBUMIN SERPL BCP-MCNC: 4 G/DL (ref 3.5–5.2)
ALP SERPL-CCNC: 73 U/L (ref 38–126)
ALT SERPL W/O P-5'-P-CCNC: 22 U/L (ref 10–44)
ANION GAP SERPL CALC-SCNC: 7 MMOL/L (ref 8–16)
AST SERPL-CCNC: 26 U/L (ref 15–46)
BACTERIA #/AREA URNS AUTO: ABNORMAL /HPF
BASOPHILS # BLD AUTO: 0.05 K/UL (ref 0–0.2)
BASOPHILS NFR BLD: 0.6 % (ref 0–1.9)
BILIRUB SERPL-MCNC: 0.6 MG/DL (ref 0.1–1)
BILIRUB UR QL STRIP: NEGATIVE
CALCIUM SERPL-MCNC: 9.7 MG/DL (ref 8.7–10.5)
CHLORIDE SERPL-SCNC: 103 MMOL/L (ref 95–110)
CLARITY UR REFRACT.AUTO: ABNORMAL
CO2 SERPL-SCNC: 30 MMOL/L (ref 23–29)
COLOR UR AUTO: ABNORMAL
CREAT SERPL-MCNC: 0.77 MG/DL (ref 0.5–1.4)
DIFFERENTIAL METHOD BLD: ABNORMAL
EOSINOPHIL # BLD AUTO: 0.2 K/UL (ref 0–0.5)
EOSINOPHIL NFR BLD: 2.1 % (ref 0–8)
ERYTHROCYTE [DISTWIDTH] IN BLOOD BY AUTOMATED COUNT: 12.9 % (ref 11.5–14.5)
EST. GFR  (NO RACE VARIABLE): >60 ML/MIN/1.73 M^2
GLUCOSE SERPL-MCNC: 89 MG/DL (ref 70–110)
GLUCOSE UR QL STRIP: NEGATIVE
HCT VFR BLD AUTO: 36.9 % (ref 37–48.5)
HGB BLD-MCNC: 12.2 G/DL (ref 12–16)
HGB UR QL STRIP: ABNORMAL
HYALINE CASTS UR QL AUTO: 0 /LPF
IMM GRANULOCYTES # BLD AUTO: 0.02 K/UL (ref 0–0.04)
IMM GRANULOCYTES NFR BLD AUTO: 0.2 % (ref 0–0.5)
KETONES UR QL STRIP: NEGATIVE
LEUKOCYTE ESTERASE UR QL STRIP: ABNORMAL
LYMPHOCYTES # BLD AUTO: 2.4 K/UL (ref 1–4.8)
LYMPHOCYTES NFR BLD: 27.8 % (ref 18–48)
MCH RBC QN AUTO: 31 PG (ref 27–31)
MCHC RBC AUTO-ENTMCNC: 33.1 G/DL (ref 32–36)
MCV RBC AUTO: 94 FL (ref 82–98)
MICROSCOPIC COMMENT: ABNORMAL
MONOCYTES # BLD AUTO: 0.8 K/UL (ref 0.3–1)
MONOCYTES NFR BLD: 9.5 % (ref 4–15)
NEUTROPHILS # BLD AUTO: 5.2 K/UL (ref 1.8–7.7)
NEUTROPHILS NFR BLD: 59.8 % (ref 38–73)
NITRITE UR QL STRIP: POSITIVE
NRBC BLD-RTO: 0 /100 WBC
PH UR STRIP: 6 [PH] (ref 5–8)
PLATELET # BLD AUTO: 255 K/UL (ref 150–450)
PMV BLD AUTO: 9.4 FL (ref 9.2–12.9)
POTASSIUM SERPL-SCNC: 3.5 MMOL/L (ref 3.5–5.1)
PROT SERPL-MCNC: 7.7 G/DL (ref 6–8.4)
PROT UR QL STRIP: ABNORMAL
RBC # BLD AUTO: 3.94 M/UL (ref 4–5.4)
RBC #/AREA URNS AUTO: >100 /HPF (ref 0–4)
SODIUM SERPL-SCNC: 140 MMOL/L (ref 136–145)
SP GR UR STRIP: 1.02 (ref 1–1.03)
URN SPEC COLLECT METH UR: ABNORMAL
UROBILINOGEN UR STRIP-ACNC: 1 EU/DL
UUN UR-MCNC: 16 MG/DL (ref 7–17)
WBC # BLD AUTO: 8.7 K/UL (ref 3.9–12.7)
WBC #/AREA URNS AUTO: 45 /HPF (ref 0–5)

## 2025-03-15 PROCEDURE — 85025 COMPLETE CBC W/AUTO DIFF WBC: CPT | Mod: ER | Performed by: STUDENT IN AN ORGANIZED HEALTH CARE EDUCATION/TRAINING PROGRAM

## 2025-03-15 PROCEDURE — 63600175 PHARM REV CODE 636 W HCPCS: Mod: ER | Performed by: STUDENT IN AN ORGANIZED HEALTH CARE EDUCATION/TRAINING PROGRAM

## 2025-03-15 PROCEDURE — 96375 TX/PRO/DX INJ NEW DRUG ADDON: CPT | Mod: ER

## 2025-03-15 PROCEDURE — 96361 HYDRATE IV INFUSION ADD-ON: CPT | Mod: ER

## 2025-03-15 PROCEDURE — 80053 COMPREHEN METABOLIC PANEL: CPT | Mod: ER | Performed by: STUDENT IN AN ORGANIZED HEALTH CARE EDUCATION/TRAINING PROGRAM

## 2025-03-15 PROCEDURE — 25000003 PHARM REV CODE 250: Mod: ER | Performed by: STUDENT IN AN ORGANIZED HEALTH CARE EDUCATION/TRAINING PROGRAM

## 2025-03-15 PROCEDURE — 99285 EMERGENCY DEPT VISIT HI MDM: CPT | Mod: 25,ER

## 2025-03-15 PROCEDURE — 81000 URINALYSIS NONAUTO W/SCOPE: CPT | Mod: ER | Performed by: STUDENT IN AN ORGANIZED HEALTH CARE EDUCATION/TRAINING PROGRAM

## 2025-03-15 PROCEDURE — 87086 URINE CULTURE/COLONY COUNT: CPT | Mod: ER | Performed by: STUDENT IN AN ORGANIZED HEALTH CARE EDUCATION/TRAINING PROGRAM

## 2025-03-15 PROCEDURE — 96374 THER/PROPH/DIAG INJ IV PUSH: CPT | Mod: ER

## 2025-03-15 RX ORDER — SULFAMETHOXAZOLE AND TRIMETHOPRIM 800; 160 MG/1; MG/1
1 TABLET ORAL 2 TIMES DAILY
Qty: 14 TABLET | Refills: 0 | Status: SHIPPED | OUTPATIENT
Start: 2025-03-15 | End: 2025-03-22

## 2025-03-15 RX ORDER — OXYCODONE AND ACETAMINOPHEN 5; 325 MG/1; MG/1
1 TABLET ORAL
Refills: 0 | Status: COMPLETED | OUTPATIENT
Start: 2025-03-15 | End: 2025-03-15

## 2025-03-15 RX ORDER — SULFAMETHOXAZOLE AND TRIMETHOPRIM 800; 160 MG/1; MG/1
1 TABLET ORAL
Status: COMPLETED | OUTPATIENT
Start: 2025-03-15 | End: 2025-03-15

## 2025-03-15 RX ORDER — KETOROLAC TROMETHAMINE 30 MG/ML
15 INJECTION, SOLUTION INTRAMUSCULAR; INTRAVENOUS
Status: COMPLETED | OUTPATIENT
Start: 2025-03-15 | End: 2025-03-15

## 2025-03-15 RX ORDER — ONDANSETRON HYDROCHLORIDE 2 MG/ML
4 INJECTION, SOLUTION INTRAVENOUS
Status: COMPLETED | OUTPATIENT
Start: 2025-03-15 | End: 2025-03-15

## 2025-03-15 RX ORDER — OXYCODONE AND ACETAMINOPHEN 5; 325 MG/1; MG/1
1 TABLET ORAL EVERY 6 HOURS PRN
Qty: 12 TABLET | Refills: 0 | Status: SHIPPED | OUTPATIENT
Start: 2025-03-15

## 2025-03-15 RX ORDER — MORPHINE SULFATE 4 MG/ML
4 INJECTION, SOLUTION INTRAMUSCULAR; INTRAVENOUS
Refills: 0 | Status: COMPLETED | OUTPATIENT
Start: 2025-03-15 | End: 2025-03-15

## 2025-03-15 RX ORDER — TAMSULOSIN HYDROCHLORIDE 0.4 MG/1
0.4 CAPSULE ORAL DAILY
Qty: 10 CAPSULE | Refills: 0 | Status: SHIPPED | OUTPATIENT
Start: 2025-03-15 | End: 2026-03-15

## 2025-03-15 RX ADMIN — SODIUM CHLORIDE 1000 ML: 9 INJECTION, SOLUTION INTRAVENOUS at 12:03

## 2025-03-15 RX ADMIN — MORPHINE SULFATE 4 MG: 4 INJECTION INTRAVENOUS at 01:03

## 2025-03-15 RX ADMIN — KETOROLAC TROMETHAMINE 15 MG: 30 INJECTION, SOLUTION INTRAMUSCULAR; INTRAVENOUS at 12:03

## 2025-03-15 RX ADMIN — SULFAMETHOXAZOLE AND TRIMETHOPRIM 1 TABLET: 800; 160 TABLET ORAL at 03:03

## 2025-03-15 RX ADMIN — ONDANSETRON 4 MG: 2 INJECTION INTRAMUSCULAR; INTRAVENOUS at 01:03

## 2025-03-15 RX ADMIN — OXYCODONE HYDROCHLORIDE AND ACETAMINOPHEN 1 TABLET: 5; 325 TABLET ORAL at 03:03

## 2025-03-15 NOTE — Clinical Note
"Cendria "Chavonatasha Gan was seen and treated in our emergency department on 3/15/2025.  She may return to work on 03/17/2025.       If you have any questions or concerns, please don't hesitate to call.      Antoni Blas MD"

## 2025-03-15 NOTE — ED PROVIDER NOTES
"ED Provider Note - 3/15/2025    History     Chief Complaint   Patient presents with    Flank Pain     Pt to the Er with c/o right flank pain that "woke her about 2 hours ago." Pt reports to taking 2 Ibuprofen with no relief. Pt reports hx of kidney stones, denies urinary s/s.       HPI     Omkar Gan is a 49 y.o. year old female with past medical and surgical history as seen below, presenting with chief complaint of flank pain.  Right-sided.  Started appropriately 2 hours prior to presentation.  Similar to prior episodes of kidney stones.  Has had to have ureteral stents placed in the past.  No fever or chills.  Mild dysuria without gross hematuria.    Records reviewed:   08/19/24 ED at [place] for knee pain  06/19/24 ED at [place] for renal colic  06/09/2024 ED at [place] for nephrolithiasis  04/17/2023 ED at [place] for renal colic  11/05/2022 ED as [facility] for laceration of forehead  02/18/2025 [facility]      Past Medical History:   Diagnosis Date    Allergy     Graves disease     Graves disease     History of kidney stones     History of uterine fibroid     Hypertension     Kidney stone      Past Surgical History:   Procedure Laterality Date    CYSTOSCOPY W/ URETERAL STENT PLACEMENT Left 6/24/2024    Procedure: CYSTOSCOPY, WITH URETERAL STENT INSERTION;  Surgeon: Pasha Jeffries MD;  Location: [facility] OR;  Service: Urology;  Laterality: Left;    CYSTOURETEROSCOPY, WITH HOLMIUM LASER LITHOTRIPSY OF URETERAL CALCULUS AND STENT INSERTION Left 6/24/2024    Procedure: Cystoscopy, left retrograde pyelogram, left ureteroscopy with holmium laser lithotripsy, stone basket extraction, left JJ stent;  Surgeon: Pasha Jeffries MD;  Location: [facility] OR;  Service: Urology;  Laterality: Left;    CYSTOURETEROSCOPY,WITH HOLMIUM LASER LITHOTRIPSY OF URETERAL CALCULUS Left 6/10/2024    Procedure: CYSTOURETEROSCOPY,WITH HOLMIUM LASER LITHOTRIPSY OF URETERAL CALCULUS;  Surgeon: Pasha Jeffries MD;  " Location: [facility] OR;  Service: Urology;  Laterality: Left;    EXTRACTION - STONE Left 6/24/2024    Procedure: EXTRACTION - STONE;  Surgeon: Pasha Jeffries MD;  Location: [facility] OR;  Service: Urology;  Laterality: Left;    FACIAL RECONSTRUCTION SURGERY      FACIAL RECONSTRUCTION SURGERY      FACIAL RECONSTRUCTION SURGERY      HYSTERECTOMY      REMOVAL-STENT Left 6/24/2024    Procedure: REMOVAL-STENT;  Surgeon: Pasha Jeffries MD;  Location: [facility] OR;  Service: Urology;  Laterality: Left;    RETROGRADE PYELOGRAPHY Left 6/10/2024    Procedure: CYSTOSCOPY, left retrograde pyelogram, left JJ stent;  Surgeon: Pasha Jeffries MD;  Location: [facility] OR;  Service: Urology;  Laterality: Left;  MAC vs choice    RETROGRADE PYELOGRAPHY Left 6/24/2024    Procedure: PYELOGRAM, RETROGRADE;  Surgeon: Pasha Jeffries MD;  Location: [facility] OR;  Service: Urology;  Laterality: Left;    URETERAL STENT PLACEMENT Left 6/10/2024    Procedure: INSERTION, STENT, URETER;  Surgeon: Pasha Jeffries MD;  Location: [facility] OR;  Service: Urology;  Laterality: Left;         Family History   Problem Relation Name Age of Onset    Hypertension Maternal Grandmother      Pacemaker/defibrilator Maternal Grandmother       Social History[1]  Social Drivers of Health with Concerns     Financial Resource Strain: High Risk (6/18/2024)    Overall Financial Resource Strain (CARDIA)     Difficulty of Paying Living Expenses: Very hard   Food Insecurity: Food Insecurity Present (6/18/2024)    Hunger Vital Sign     Worried About Running Out of Food in the Last Year: Often true     Ran Out of Food in the Last Year: Often true   Stress: Stress Concern Present (6/18/2024)    [employer] [place] [employer] - Occupational Stress Questionnaire     Feeling of Stress : Rather much   Housing Stability: High Risk (6/18/2024)    Housing Stability Vital Sign     Unable to Pay for Housing in the Last Year: Yes     Number of Times Moved in the Last Year:  Not on file     Homeless in the Last Year: No   Utilities: At Risk (6/18/2024)    [facility] Utilities     Threatened with loss of utilities: Yes      Review of patient's allergies indicates:   Allergen Reactions    Penicillins Nausea And Vomiting     + dizziness       Review of Systems     A full Review of Systems (ROS) was performed and was negative unless otherwise stated in the HPI.      Physical Exam     Vitals:    03/15/25 0303 03/15/25 0316 03/15/25 0332 03/15/25 0336   BP: 105/71  113/71    Pulse: (!) 54  61    Resp:    17   Temp:  98.2 °F (36.8 °C)     TempSrc:  Oral     SpO2: 99%  99%    Weight:       Height:            Physical Exam    Nursing note and vitals reviewed.  Constitutional: She appears well-developed and well-nourished. No distress.   HENT:   Head: Normocephalic and atraumatic.   Right Ear: External ear normal.   Left Ear: External ear normal.   Nose: Nose normal. Mouth/Throat: Oropharynx is clear and moist.   Eyes: Conjunctivae and EOM are normal. Pupils are equal, round, and reactive to light.   Neck: Neck supple.   Normal range of motion.  Cardiovascular:  Normal rate, regular rhythm, normal heart sounds and intact distal pulses.           Pulmonary/Chest: Breath sounds normal. No stridor. No respiratory distress. She has no wheezes. She has no rhonchi. She has no rales.   Abdominal: Abdomen is soft. Bowel sounds are normal. There is no abdominal tenderness.   No right CVA tenderness.  No left CVA tenderness.   Musculoskeletal:         General: No tenderness or edema. Normal range of motion.      Cervical back: Normal range of motion and neck supple.     Neurological: She is alert and oriented to person, place, and time. She has normal strength. No cranial nerve deficit or sensory deficit.   Skin: Skin is warm and dry. No rash noted.   Psychiatric: She has a normal mood and affect. Thought content normal.         Lab Results- Independently reviewed by myself      Labs Reviewed   URINALYSIS, REFLEX  TO URINE CULTURE - Abnormal       Result Value    Specimen UA Urine, Clean Catch      Color, UA Orange (*)     Appearance, UA Hazy (*)     pH, UA 6.0      Specific Gravity, UA 1.020      Protein, UA 1+ (*)     Glucose, UA Negative      Ketones, UA Negative      Bilirubin (UA) Negative      Occult Blood UA 3+ (*)     Nitrite, UA Positive (*)     Urobilinogen, UA 1.0      Leukocytes, UA 2+ (*)     Narrative:     Preferred Collection Type->Urine, Clean Catch  Specimen Source->Urine   CBC W/ AUTO DIFFERENTIAL - Abnormal    WBC 8.70      RBC 3.94 (*)     Hemoglobin 12.2      Hematocrit 36.9 (*)     MCV 94      MCH 31.0      MCHC 33.1      RDW 12.9      Platelets 255      MPV 9.4      Immature Granulocytes 0.2      Gran # (ANC) 5.2      Immature Grans (Abs) 0.02      Lymph # 2.4      Mono # 0.8      Eos # 0.2      Baso # 0.05      nRBC 0      Gran % 59.8      Lymph % 27.8      Mono % 9.5      Eosinophil % 2.1      Basophil % 0.6      Differential Method Automated     COMPREHENSIVE METABOLIC PANEL - Abnormal    Sodium 140      Potassium 3.5      Chloride 103      CO2 30 (*)     Glucose 89      BUN 16      Creatinine 0.77      Calcium 9.7      Total Protein 7.7      Albumin 4.0      Total Bilirubin 0.6      Alkaline Phosphatase 73      AST 26      ALT 22      Anion Gap 7 (*)     eGFR >60.0     URINALYSIS MICROSCOPIC - Abnormal    RBC, UA >100 (*)     WBC, UA 45 (*)     Bacteria Few (*)     Hyaline Casts, UA 0      Microscopic Comment SEE COMMENT      Narrative:     Preferred Collection Type->Urine, Clean Catch  Specimen Source->Urine   CULTURE, URINE           Imaging     Imaging Results              CT Renal Stone Study ABD Pelvis WO (In process)                   X-Rays:   Independently Interpreted Readings:   Abdomen:   Renal Stone Study - Kidney(s): Stone(s) present - both kidney(s).                ED Course         Procedures         Orders Placed This Encounter    Urine culture    CT Renal Stone Study ABD Pelvis WO     Urinalysis, Reflex to Urine Culture Urine, Clean Catch    CBC auto differential    Comprehensive metabolic panel    Urinalysis Microscopic    Saline lock IV    sodium chloride 0.9% bolus 1,000 mL 1,000 mL    ketorolac injection 15 mg    morphine injection 4 mg    ondansetron injection 4 mg    oxyCODONE-acetaminophen 5-325 mg per tablet 1 tablet    sulfamethoxazole-trimethoprim 800-160mg per tablet 1 tablet    oxyCODONE-acetaminophen (PERCOCET) 5-325 mg per tablet    sulfamethoxazole-trimethoprim 800-160mg (BACTRIM DS) 800-160 mg Tab    tamsulosin (FLOMAX) 0.4 mg Cap          ED Course as of 03/15/25 0430   Sat Mar 15, 2025   0140 NITRITE UA(!): Positive [KB]   0142 Leukocyte Esterase, UA(!): 2+ [KB]   0142 WBC, UA(!): 45 [KB]   0142 RBC, UA(!): >100 [KB]   0151 CBC auto differential(!)  CBC: No significant anemia, platelet disorder, or leukocytosis.   [KB]   0151 Comprehensive metabolic panel(!)  CMP: Normal electrolytes, renal function, liver enzymes   [KB]   0324 [employer] overnight preliminary read of CT abdomen and pelvis without contrast:  Bilateral nephrolithiasis without definite obstructing stone identified.  Largest stone is in the right renal pelvis measuring approximately 9 mm.   [KB]      ED Course User Index  [KB] Antoni Blas MD              Medical Decision Making       The patient's list of active medical problems, social history, medications, and allergies as documented per RN staff has been reviewed.           Medical Decision Making  This is a 49 y.o. female who presents with flank pain.   Differential discussion:  Given history, exam, and work up I have low suspicion for atypical appendicitis, genital torsion, acute cholecystitis, AAA, or other emergent intraabdominal pathology. UA with RBC, WBC, and nitrites, concerning for UTI. BMP without evidence of BARBARA.  CT renal stone study obtained due to flank pain with concern for possible urolith.  No distinct urolith on overnight prelim read, however  there is a large renal stone approaching the ureteral junction on the right side which corresponds with patient's symptoms.    Pain treated in ED with Toradol/morphine with improvement in symptoms at time of reassessment. Bactrim for UTI pending culture results. Told to return to ED if symptoms worsen, return, or change in character.  Otherwise can follow up with urology for further assessment.      Problems Addressed:  Renal colic: acute illness or injury    Amount and/or Complexity of Data Reviewed  External Data Reviewed: labs, radiology and notes.  Labs: ordered. Decision-making details documented in ED Course.  Radiology: ordered and independent interpretation performed.    Risk  Prescription drug management.                    ED Prescriptions       Medication Sig Dispense Start Date End Date Auth. Provider    oxyCODONE-acetaminophen (PERCOCET) 5-325 mg per tablet Take 1 tablet by mouth every 6 (six) hours as needed for Pain. 12 tablet 3/15/2025 -- Antoni Blas MD    sulfamethoxazole-trimethoprim 800-160mg (BACTRIM DS) 800-160 mg Tab Take 1 tablet by mouth 2 (two) times daily. for 7 days 14 tablet 3/15/2025 3/22/2025 Antoni Blas MD    tamsulosin (FLOMAX) 0.4 mg Cap Take 1 capsule (0.4 mg total) by mouth once daily. 10 capsule 3/15/2025 3/15/2026 Antoni Blas MD              Clinical Impression       Follow-up Information       Follow up With Specialties Details Why Contact Info    Pasha Jeffries MD Urology Schedule an appointment as soon as possible for a visit in 3 days For follow-up on today's visit. [address] [facility]  [address]  [facility] [address] [phone]      [facility] - Emergency Dept Emergency Medicine Go to  As needed, If symptoms worsen [address] [facility]  Emergency Department  [place] [address] [phone]            Referrals:  No orders of the defined types were placed in this encounter.      Disposition   ED Disposition Condition    Discharge Stable               Final diagnoses:  [N23] Renal colic (Primary)  [N39.0, R31.9] Urinary tract infection with hematuria, site unspecified        Antoni Blas MD        03/15/2025          DISCLAIMER: This note was prepared with [employer] voice recognition transcription software. Garbled syntax, mangled pronouns, and other bizarre constructions may be attributed to that software system.         [1]   Social History  Tobacco Use    Smoking status: Never     Passive exposure: Never    Smokeless tobacco: Never   Substance Use Topics    Alcohol use: Yes     Alcohol/week: 7.0 standard drinks of alcohol     Types: 7 Glasses of wine per week     Comment: states having a drink or two every day    Drug use: No        Antoni Blas MD  03/15/25 043

## 2025-03-17 LAB — BACTERIA UR CULT: NORMAL

## 2025-03-26 DIAGNOSIS — K59.03 DRUG-INDUCED CONSTIPATION: ICD-10-CM

## 2025-03-26 DIAGNOSIS — M17.0 PRIMARY OSTEOARTHRITIS OF BOTH KNEES: ICD-10-CM

## 2025-03-26 RX ORDER — MELOXICAM 15 MG/1
15 TABLET ORAL DAILY PRN
Qty: 30 TABLET | Refills: 0 | Status: SHIPPED | OUTPATIENT
Start: 2025-03-26

## 2025-03-26 NOTE — TELEPHONE ENCOUNTER
No care due was identified.  [facility] Embedded Care Due Messages. Reference number: [id number].   3/26/2025 3:31:50 PM CDT

## 2025-03-27 NOTE — TELEPHONE ENCOUNTER
Refill Routing Note   Medication(s) are not appropriate for processing by [facility] for the following reason(s):        Outside of protocol    [facility] action(s):  Route             Appointments  past 12m or future 3m with PCP    Date Provider   Last Visit   2/18/2025 Funmilayo Kumar A.M., MD   Next Visit   Visit date not found Funmilayo Kumar A.M., MD   ED visits in past 90 days: 1        Note composed:10:12 AM 03/27/2025

## 2025-03-28 RX ORDER — LACTULOSE 10 G/15ML
10 SOLUTION ORAL; RECTAL 2 TIMES DAILY
Qty: 630 ML | Refills: 0 | Status: SHIPPED | OUTPATIENT
Start: 2025-03-28

## 2025-05-05 DIAGNOSIS — M17.0 PRIMARY OSTEOARTHRITIS OF BOTH KNEES: ICD-10-CM

## 2025-05-05 RX ORDER — MELOXICAM 15 MG/1
15 TABLET ORAL DAILY PRN
Qty: 30 TABLET | Refills: 0 | Status: SHIPPED | OUTPATIENT
Start: 2025-05-05

## 2025-05-13 ENCOUNTER — OFFICE VISIT (OUTPATIENT)
Dept: ORTHOPEDICS | Facility: CLINIC | Age: 50
End: 2025-05-13
Payer: COMMERCIAL

## 2025-05-13 VITALS — BODY MASS INDEX: 37.23 KG/M2 | WEIGHT: 231.69 LBS | HEIGHT: 66 IN

## 2025-05-13 DIAGNOSIS — M17.0 PRIMARY OSTEOARTHRITIS OF BOTH KNEES: Primary | ICD-10-CM

## 2025-05-13 PROCEDURE — 99999 PR PBB SHADOW E&M-EST. PATIENT-LVL III: CPT | Mod: PBBFAC,,,

## 2025-05-13 RX ORDER — TRIAMCINOLONE ACETONIDE 40 MG/ML
80 INJECTION, SUSPENSION INTRA-ARTICULAR; INTRAMUSCULAR ONCE
Status: COMPLETED | OUTPATIENT
Start: 2025-05-13 | End: 2025-05-13

## 2025-05-13 RX ADMIN — TRIAMCINOLONE ACETONIDE 80 MG: 40 INJECTION, SUSPENSION INTRA-ARTICULAR; INTRAMUSCULAR at 01:05

## 2025-05-13 NOTE — PROGRESS NOTES
Omkar Gan is a 50 y.o. female with known history of bilateral knee osteoarthritis being managed conservatively with intermittent intraarticular corticosteroid injections with the last of which performed by myself on 2/18/2025 with significant relief. She presents today to repeat these injections. No interval changes to history/physical.     Knee Injection Procedure Note  Diagnosis: bilateral knee degenerative arthritis  Indications: bilateral knee pain  Procedure Details: Verbal consent was obtained for the procedure. The injection site was identified and the skin was prepared with alcohol. The bilateral knee was injected from an anterolateral approach with 1 ml of Kenalog and 4 ml Lidocaine under sterile technique using a 22 gauge needle. The needle was removed and the area cleansed and dressed.  Complications:  Patient tolerated the procedure well.    she was advised to rest the knee today, using ice and elevation as needed for comfort and swelling.Immediate relief of the knee pain may be short lived and secondary to the lidocaine. she may have an increase in discomfort tonight followed by steady improvement over the next several days. It may take 1-2 weeks following the injection to get the full benefit of the medication.     - She may be interested in viscosupplementation injections if no relief from today's injections.

## 2025-05-27 DIAGNOSIS — I10 PRIMARY HYPERTENSION: ICD-10-CM

## 2025-05-27 NOTE — TELEPHONE ENCOUNTER
No care due was identified.  [facility] Embedded Care Due Messages. Reference number: [id number].   5/27/2025 5:54:11 PM CDT

## 2025-05-28 RX ORDER — VALSARTAN AND HYDROCHLOROTHIAZIDE 80; 12.5 MG/1; MG/1
2 TABLET, FILM COATED ORAL DAILY
Qty: 180 TABLET | Refills: 0
Start: 2025-05-28 | End: 2025-08-26

## 2025-06-06 ENCOUNTER — PATIENT MESSAGE (OUTPATIENT)
Dept: ADMINISTRATIVE | Facility: HOSPITAL | Age: 50
End: 2025-06-06
Payer: COMMERCIAL

## 2025-06-07 DIAGNOSIS — K59.03 DRUG-INDUCED CONSTIPATION: ICD-10-CM

## 2025-06-07 DIAGNOSIS — M17.0 PRIMARY OSTEOARTHRITIS OF BOTH KNEES: ICD-10-CM

## 2025-06-07 RX ORDER — LACTULOSE 10 G/15ML
10 SOLUTION ORAL; RECTAL 2 TIMES DAILY
Qty: 630 ML | Refills: 0 | OUTPATIENT
Start: 2025-06-07

## 2025-06-07 NOTE — TELEPHONE ENCOUNTER
No care due was identified.  [facility] Embedded Care Due Messages. Reference number: [id number].   6/07/2025 10:38:16 AM CDT

## 2025-06-07 NOTE — TELEPHONE ENCOUNTER
Refill Routing Note   Medication(s) are not appropriate for processing by [facility] for the following reason(s):        Outside of protocol    [facility] action(s):  Route               Appointments  past 12m or future 3m with PCP    Date Provider   Last Visit   2/18/2025 Funmilayo Kumar A.M., MD   Next Visit   Visit date not found Funmilayo Kumar A.M., MD   ED visits in past 90 days: 1        Note composed:12:53 PM 06/07/2025

## 2025-06-07 NOTE — TELEPHONE ENCOUNTER
Provider Staff:     Action required for this patient.    Please note Refusal of medication.            Requested Prescriptions     Refused Prescriptions Disp Refills    lactulose (CHRONULAC) 10 gram/15 mL solution 630 mL 0     Sig: Take 15 mLs (10 g total) by mouth 2 (two) times daily.     Refused By: NATHAN PILLAI     Reason for Refusal: Patient needs an appointment      Thanks!  [facility]   Note composed: 06/07/2025 4:28 PM

## 2025-06-09 RX ORDER — MELOXICAM 15 MG/1
15 TABLET ORAL DAILY PRN
Qty: 30 TABLET | Refills: 0 | Status: SHIPPED | OUTPATIENT
Start: 2025-06-09

## 2025-06-17 DIAGNOSIS — K59.03 DRUG-INDUCED CONSTIPATION: ICD-10-CM

## 2025-06-17 RX ORDER — LACTULOSE 10 G/15ML
10 SOLUTION ORAL; RECTAL 2 TIMES DAILY
Qty: 237 ML | Refills: 0 | Status: SHIPPED | OUTPATIENT
Start: 2025-06-17

## 2025-06-17 NOTE — TELEPHONE ENCOUNTER
No care due was identified.  [facility] Embedded Care Due Messages. Reference number: [id number].   6/17/2025 4:20:04 PM CDT

## 2025-06-28 ENCOUNTER — ON-DEMAND VIRTUAL (OUTPATIENT)
Dept: URGENT CARE | Facility: CLINIC | Age: 50
End: 2025-06-28
Payer: COMMERCIAL

## 2025-06-28 DIAGNOSIS — N30.90 CYSTITIS: Primary | ICD-10-CM

## 2025-06-28 PROCEDURE — 98005 SYNCH AUDIO-VIDEO EST LOW 20: CPT | Mod: 95,,, | Performed by: NURSE PRACTITIONER

## 2025-06-28 RX ORDER — CIPROFLOXACIN 500 MG/1
500 TABLET, FILM COATED ORAL EVERY 12 HOURS
Qty: 10 TABLET | Refills: 0 | Status: SHIPPED | OUTPATIENT
Start: 2025-06-28 | End: 2025-07-03

## 2025-06-29 NOTE — PROGRESS NOTES
Subjective:      Patient ID: Omkar Gan is a 50 y.o. female.    Vitals:  vitals were not taken for this visit.     Chief Complaint: Dysuria      Visit Type: TELE AUDIOVISUAL - This visit was conducted virtually based on  subjective information and limited objective exam    Present with the patient at the time of consultation: TELEMED PRESENT WITH PATIENT: None  LOCATION OF PATIENT [place]   Two patient identifiers used to verify patient- saying out date of birth and full name.       Past Medical History:   Diagnosis Date    Allergy     Graves disease     Graves disease     History of kidney stones     History of uterine fibroid     Hypertension     Kidney stone      Past Surgical History:   Procedure Laterality Date    CYSTOSCOPY W/ URETERAL STENT PLACEMENT Left 6/24/2024    Procedure: CYSTOSCOPY, WITH URETERAL STENT INSERTION;  Surgeon: Pasha Jeffries MD;  Location: [facility];  Service: Urology;  Laterality: Left;    CYSTOURETEROSCOPY, WITH HOLMIUM LASER LITHOTRIPSY OF URETERAL CALCULUS AND STENT INSERTION Left 6/24/2024    Procedure: Cystoscopy, left retrograde pyelogram, left ureteroscopy with holmium laser lithotripsy, stone basket extraction, left JJ stent;  Surgeon: Pasha Jeffries MD;  Location: [facility];  Service: Urology;  Laterality: Left;    CYSTOURETEROSCOPY,WITH HOLMIUM LASER LITHOTRIPSY OF URETERAL CALCULUS Left 6/10/2024    Procedure: CYSTOURETEROSCOPY,WITH HOLMIUM LASER LITHOTRIPSY OF URETERAL CALCULUS;  Surgeon: Pasha Jeffries MD;  Location: [facility];  Service: Urology;  Laterality: Left;    EXTRACTION - STONE Left 6/24/2024    Procedure: EXTRACTION - STONE;  Surgeon: Pasha Jeffries MD;  Location: [facility] OR;  Service: Urology;  Laterality: Left;    FACIAL RECONSTRUCTION SURGERY      FACIAL RECONSTRUCTION SURGERY      FACIAL RECONSTRUCTION SURGERY      HYSTERECTOMY      REMOVAL-STENT Left 6/24/2024    Procedure: REMOVAL-STENT;  Surgeon: Pasha Jeffries MD;  Location:  [facility] OR;  Service: Urology;  Laterality: Left;    RETROGRADE PYELOGRAPHY Left 6/10/2024    Procedure: CYSTOSCOPY, left retrograde pyelogram, left JJ stent;  Surgeon: Pasha Jeffries MD;  Location: [facility] OR;  Service: Urology;  Laterality: Left;  MAC vs choice    RETROGRADE PYELOGRAPHY Left 6/24/2024    Procedure: PYELOGRAM, RETROGRADE;  Surgeon: Pasha Jeffries MD;  Location: [facility] OR;  Service: Urology;  Laterality: Left;    URETERAL STENT PLACEMENT Left 6/10/2024    Procedure: INSERTION, STENT, URETER;  Surgeon: Pasha Jeffries MD;  Location: [facility] OR;  Service: Urology;  Laterality: Left;     Review of patient's allergies indicates:   Allergen Reactions    Penicillins Nausea And Vomiting     + dizziness     Medications Ordered Prior to Encounter[1]  Family History   Problem Relation Name Age of Onset    Hypertension Maternal Grandmother      Pacemaker/defibrilator Maternal Grandmother         Medications Ordered                [facility] [address] [place], [place] [address]  AIRLINE Counts include 234 beds at the [facility]   [address] [facility] [address]    Telephone: [phone]   Fax: [phone]   Hours: Not open 24 hours                         E-Prescribed (1 of 1)              ciprofloxacin HCl (CIPRO) 500 MG tablet    Sig: Take 1 tablet (500 mg total) by mouth every 12 (twelve) hours. for 5 days       Start: 6/28/25     Quantity: 10 tablet Refills: 0                           Ohs Peq Odvv Intake    6/28/2025  7:42 PM CDT - Filed by Patient   What is your current physical address in the event of a medical emergency? [id number] [id number]   Are you able to take your vital signs? No   Please attach any relevant images or files    Is your employer contracted with [facility]? No         49 yo female with hx of nephrolithiasis complains of dysuria and hematuria  x 1 week. Denies flank pain, abdominal pain and fever. Denies hx of frequent UTI, no recent antibiotic use.         Constitution: Negative. Negative for  fever.   HENT:  Negative for sore throat.    Eyes: Negative.    Respiratory:  Negative for cough and shortness of breath.    Genitourinary:  Positive for dysuria and urgency. Negative for vaginal discharge.        Objective:   The physical exam was conducted virtually.    AAO x 3 ; no acute distress noted; appearance normal; mood and behavior normal; thought process normal  Head- normocephalic  Nose- appears normal, no discharge or erythema  Eyes- pupils appear normal in size, no drainage, no erythema  Ears- normal appearing; no discharge, no erythema  Mouth- appears normal  Oropharynx- no erythema, lesions  Lungs- breathing at a normal rate, no acute distress noted  Heart- no reports of tachycardia, palpitations, chest pain  Abdomen- non distended, non tender reported by patient  Skin- warm and dry, no erythema or edema noted by patient or visualized        Assessment:     1. Cystitis        Plan:     Please collect urine at any [facility] facility lab (hospital or clinic) prior to initiation of oral antibiotics. Go to , inform staff that you have order for urinalysis to be collected.         PLEASE READ YOUR DISCHARGE INSTRUCTIONS ENTIRELY AS IT CONTAINS IMPORTANT INFORMATION.      Take the antibiotics to completion.     Drink plenty of fluids, wipe front to back, take showers not baths, no scented soaps, wear breathable cotton underwear, urinate after sexual intercourse.     Please go to Urgent Care or the ER for worsening symptoms including fever, worsening flank pain, vomiting, etc.       Please return or see your primary care doctor if you develop new or worsening symptoms.     Please arrange follow up with your primary medical clinic as soon as possible. You must understand that you've received an Urgent Care treatment only and that you may be released before all of your medical problems are known or treated. You, the patient, will arrange for follow up as instructed. If your symptoms worsen or fail to  improve you should go to the Emergency Room.  WE CANNOT RULE OUT ALL POSSIBLE CAUSES OF YOUR SYMPTOMS IN THE URGENT CARE SETTING PLEASE GO TO THE ER IF YOU FEELS YOUR CONDITION IS WORSENING OR YOU WOULD LIKE EMERGENT EVALUATION.      Thank you for choosing [facility]!    Our goal in the [facility] is to always provide outstanding medical care. You may receive a survey by mail or e-mail in the next week regarding your experience today. We would greatly appreciate you completing and returning the survey. Your feedback provides us with a way to recognize our staff who provide very good care, and it helps us learn how to improve when your experience was below our aspiration of excellence.         We appreciate you trusting us with your medical care. We hope you feel better soon. We will be happy to take care of you for all of your future medical needs.    You must understand that you've received an Urgent Care treatment only and that you may be released before all your medical problems are known or treated. You, the patient, will arrange for follow up care as instructed.    Follow up with your PCP or specialty clinic as directed in the next 1-2 weeks if not improved or as needed.  You can call [phone] to schedule an appointment with the appropriate provider.    If your condition worsens we recommend that you receive another evaluation in person, with your primary care provider, urgent care or at the emergency room immediately or contact your primary medical clinics after hours call service to discuss your concerns.         Cystitis  -     ciprofloxacin HCl (CIPRO) 500 MG tablet; Take 1 tablet (500 mg total) by mouth every 12 (twelve) hours. for 5 days  Dispense: 10 tablet; Refill: 0                          [1]   Current Outpatient Medications on File Prior to Visit   Medication Sig Dispense Refill    lactulose (CHRONULAC) 10 gram/15 mL solution Take 15 mLs (10 g total) by  mouth 2 (two) times daily. 237 mL 0    meloxicam (MOBIC) 15 MG tablet Take 1 tablet (15 mg total) by mouth daily as needed for Pain. 30 tablet 0    oxyCODONE-acetaminophen (PERCOCET) 5-325 mg per tablet Take 1 tablet by mouth every 6 (six) hours as needed for Pain. 12 tablet 0    tamsulosin (FLOMAX) 0.4 mg Cap Take 1 capsule (0.4 mg total) by mouth once daily. 10 capsule 0    valsartan-hydrochlorothiazide (DIOVAN-HCT) 80-12.5 mg per tablet Take 2 tablets by mouth once daily. 180 tablet 0     No current facility-administered medications on file prior to visit.

## 2025-06-29 NOTE — PATIENT INSTRUCTIONS
Please collect urine at any [facility] facility lab (hospital or clinic) prior to initiation of oral antibiotics. Go to , inform staff that you have order for urinalysis to be collected.

## 2025-07-15 DIAGNOSIS — M17.0 PRIMARY OSTEOARTHRITIS OF BOTH KNEES: ICD-10-CM

## 2025-07-16 RX ORDER — MELOXICAM 15 MG/1
15 TABLET ORAL DAILY PRN
Qty: 30 TABLET | Refills: 0 | Status: SHIPPED | OUTPATIENT
Start: 2025-07-16

## 2025-07-30 DIAGNOSIS — I10 PRIMARY HYPERTENSION: ICD-10-CM

## 2025-07-30 RX ORDER — VALSARTAN AND HYDROCHLOROTHIAZIDE 80; 12.5 MG/1; MG/1
2 TABLET, FILM COATED ORAL DAILY
Qty: 180 TABLET | Refills: 0 | Status: SHIPPED | OUTPATIENT
Start: 2025-07-30 | End: 2025-10-28

## 2025-08-14 DIAGNOSIS — M17.0 PRIMARY OSTEOARTHRITIS OF BOTH KNEES: ICD-10-CM

## 2025-08-14 RX ORDER — MELOXICAM 15 MG/1
15 TABLET ORAL DAILY PRN
Qty: 30 TABLET | Refills: 0 | Status: SHIPPED | OUTPATIENT
Start: 2025-08-14

## 2025-08-21 DIAGNOSIS — K59.03 DRUG-INDUCED CONSTIPATION: ICD-10-CM

## 2025-08-22 RX ORDER — LACTULOSE 10 G/15ML
10 SOLUTION ORAL; RECTAL 2 TIMES DAILY
Qty: 237 ML | Refills: 0 | OUTPATIENT
Start: 2025-08-22

## 2025-08-27 ENCOUNTER — OFFICE VISIT (OUTPATIENT)
Dept: FAMILY MEDICINE | Facility: CLINIC | Age: 50
End: 2025-08-27
Payer: COMMERCIAL

## 2025-08-27 VITALS
OXYGEN SATURATION: 97 % | WEIGHT: 233.94 LBS | DIASTOLIC BLOOD PRESSURE: 70 MMHG | HEART RATE: 102 BPM | HEIGHT: 66 IN | SYSTOLIC BLOOD PRESSURE: 110 MMHG | BODY MASS INDEX: 37.6 KG/M2

## 2025-08-27 DIAGNOSIS — E66.01 CLASS 2 SEVERE OBESITY DUE TO EXCESS CALORIES WITH SERIOUS COMORBIDITY AND BODY MASS INDEX (BMI) OF 37.0 TO 37.9 IN ADULT: ICD-10-CM

## 2025-08-27 DIAGNOSIS — Z59.819 HOUSING INSTABILITY: ICD-10-CM

## 2025-08-27 DIAGNOSIS — I10 PRIMARY HYPERTENSION: Primary | ICD-10-CM

## 2025-08-27 DIAGNOSIS — R73.03 PREDIABETES: ICD-10-CM

## 2025-08-27 DIAGNOSIS — K59.03 DRUG-INDUCED CONSTIPATION: ICD-10-CM

## 2025-08-27 DIAGNOSIS — Z59.41 FOOD INSECURITY: ICD-10-CM

## 2025-08-27 DIAGNOSIS — N20.1 LEFT URETERAL STONE: ICD-10-CM

## 2025-08-27 DIAGNOSIS — Z59.12 INADEQUATE HOUSING UTILITIES: ICD-10-CM

## 2025-08-27 DIAGNOSIS — E66.812 CLASS 2 SEVERE OBESITY DUE TO EXCESS CALORIES WITH SERIOUS COMORBIDITY AND BODY MASS INDEX (BMI) OF 37.0 TO 37.9 IN ADULT: ICD-10-CM

## 2025-08-27 PROCEDURE — 99999 PR PBB SHADOW E&M-EST. PATIENT-LVL V: CPT | Mod: PBBFAC,,, | Performed by: FAMILY MEDICINE

## 2025-08-27 RX ORDER — TIRZEPATIDE 5 MG/.5ML
5 INJECTION, SOLUTION SUBCUTANEOUS
Qty: 2 ML | Refills: 0 | Status: SHIPPED | OUTPATIENT
Start: 2025-09-25 | End: 2025-10-23

## 2025-08-27 RX ORDER — TIRZEPATIDE 2.5 MG/.5ML
2.5 INJECTION, SOLUTION SUBCUTANEOUS
Qty: 2 ML | Refills: 0 | Status: SHIPPED | OUTPATIENT
Start: 2025-08-27 | End: 2025-09-24

## 2025-08-27 RX ORDER — VALSARTAN AND HYDROCHLOROTHIAZIDE 80; 12.5 MG/1; MG/1
2 TABLET, FILM COATED ORAL DAILY
Qty: 180 TABLET | Refills: 3 | Status: SHIPPED | OUTPATIENT
Start: 2025-08-27 | End: 2026-08-22

## 2025-08-27 RX ORDER — TAMSULOSIN HYDROCHLORIDE 0.4 MG/1
0.4 CAPSULE ORAL DAILY
Qty: 10 CAPSULE | Refills: 0 | Status: CANCELLED | OUTPATIENT
Start: 2025-08-27 | End: 2026-08-27

## 2025-08-27 RX ORDER — LACTULOSE 10 G/15ML
10 SOLUTION ORAL; RECTAL 2 TIMES DAILY
Qty: 237 ML | Refills: 0 | Status: CANCELLED | OUTPATIENT
Start: 2025-08-27

## 2025-08-27 RX ORDER — TIRZEPATIDE 7.5 MG/.5ML
7.5 INJECTION, SOLUTION SUBCUTANEOUS
Qty: 2 ML | Refills: 0 | Status: SHIPPED | OUTPATIENT
Start: 2025-10-24 | End: 2025-11-21

## 2025-08-27 SDOH — SOCIAL DETERMINANTS OF HEALTH (SDOH): HOUSING INSTABILITY UNSPECIFIED: Z59.819

## 2025-08-27 SDOH — SOCIAL DETERMINANTS OF HEALTH (SDOH): FOOD INSECURITY: Z59.41

## 2025-08-27 SDOH — SOCIAL DETERMINANTS OF HEALTH (SDOH): INADEQUATE HOUSING UTILITIES: Z59.12

## 2025-08-28 ENCOUNTER — LAB VISIT (OUTPATIENT)
Dept: LAB | Facility: HOSPITAL | Age: 50
End: 2025-08-28
Attending: FAMILY MEDICINE
Payer: COMMERCIAL

## 2025-08-28 ENCOUNTER — HOSPITAL ENCOUNTER (EMERGENCY)
Facility: HOSPITAL | Age: 50
Discharge: HOME | End: 2025-08-28
Attending: EMERGENCY MEDICINE
Payer: COMMERCIAL

## 2025-08-28 VITALS
OXYGEN SATURATION: 98 % | BODY MASS INDEX: 38.32 KG/M2 | HEART RATE: 80 BPM | TEMPERATURE: 98 F | RESPIRATION RATE: 16 BRPM | DIASTOLIC BLOOD PRESSURE: 79 MMHG | SYSTOLIC BLOOD PRESSURE: 116 MMHG | HEIGHT: 65 IN | WEIGHT: 230 LBS

## 2025-08-28 DIAGNOSIS — I10 PRIMARY HYPERTENSION: ICD-10-CM

## 2025-08-28 DIAGNOSIS — M17.0 OSTEOARTHRITIS OF BOTH KNEES, UNSPECIFIED OSTEOARTHRITIS TYPE: Primary | ICD-10-CM

## 2025-08-28 DIAGNOSIS — R73.03 PREDIABETES: ICD-10-CM

## 2025-08-28 LAB
ANION GAP (OHS): 11 MMOL/L (ref 8–16)
BUN SERPL-MCNC: 23 MG/DL (ref 6–20)
CALCIUM SERPL-MCNC: 9.7 MG/DL (ref 8.7–10.5)
CHLORIDE SERPL-SCNC: 104 MMOL/L (ref 95–110)
CO2 SERPL-SCNC: 23 MMOL/L (ref 23–29)
CREAT SERPL-MCNC: 0.9 MG/DL (ref 0.5–1.4)
EAG (OHS): 117 MG/DL (ref 68–131)
GFR SERPLBLD CREATININE-BSD FMLA CKD-EPI: >60 ML/MIN/1.73/M2
GLUCOSE SERPL-MCNC: 135 MG/DL (ref 70–110)
HBA1C MFR BLD: 5.7 % (ref 4–5.6)
POTASSIUM SERPL-SCNC: 3.7 MMOL/L (ref 3.5–5.1)
SODIUM SERPL-SCNC: 138 MMOL/L (ref 136–145)

## 2025-08-28 PROCEDURE — 83036 HEMOGLOBIN GLYCOSYLATED A1C: CPT

## 2025-08-28 PROCEDURE — 99283 EMERGENCY DEPT VISIT LOW MDM: CPT | Mod: ER

## 2025-08-28 PROCEDURE — 36415 COLL VENOUS BLD VENIPUNCTURE: CPT | Mod: PO

## 2025-08-28 PROCEDURE — 63600175 PHARM REV CODE 636 W HCPCS: Mod: ER | Performed by: EMERGENCY MEDICINE

## 2025-08-28 PROCEDURE — 80048 BASIC METABOLIC PNL TOTAL CA: CPT

## 2025-08-28 RX ORDER — PREDNISONE 20 MG/1
40 TABLET ORAL DAILY
Qty: 8 TABLET | Refills: 0 | Status: SHIPPED | OUTPATIENT
Start: 2025-08-28 | End: 2025-09-01

## 2025-08-28 RX ORDER — PREDNISONE 20 MG/1
40 TABLET ORAL ONCE
Status: DISCONTINUED | OUTPATIENT
Start: 2025-08-28 | End: 2025-08-28

## 2025-08-28 RX ORDER — PREDNISONE 20 MG/1
40 TABLET ORAL ONCE
Status: COMPLETED | OUTPATIENT
Start: 2025-08-28 | End: 2025-08-28

## 2025-08-28 RX ADMIN — PREDNISONE 40 MG: 20 TABLET ORAL at 02:08

## 2025-08-29 ENCOUNTER — PATIENT OUTREACH (OUTPATIENT)
Dept: ADMINISTRATIVE | Facility: OTHER | Age: 50
End: 2025-08-29
Payer: COMMERCIAL

## (undated) DEVICE — TOWEL OR XRAY BLUE 17X26IN

## (undated) DEVICE — BAG LINGEMAN DRAIN UROLOGY

## (undated) DEVICE — SET IRR URLGY 2LINE UNIV SPIKE

## (undated) DEVICE — GLOVE BIOGEL SKINSENSE PI 8.0

## (undated) DEVICE — SPONGE COTTON TRAY 4X4IN

## (undated) DEVICE — TOWEL OR DISP STRL BLUE 4/PK

## (undated) DEVICE — CATH URTRL OPEN END STR TIP 5F

## (undated) DEVICE — JELLY LUBRICANT STERILE 4 OZ

## (undated) DEVICE — CONTAINER MULTIPURPOSE/SPECIME

## (undated) DEVICE — GUIDE WIRE MOTION .035 X 150CM

## (undated) DEVICE — FIBER MOSES 200 DFL

## (undated) DEVICE — BAG PRESSURE INFUSER 3000CC

## (undated) DEVICE — COVER TABLE HVY DTY 60X90IN

## (undated) DEVICE — SOL IRR NACL .9% 3000ML

## (undated) DEVICE — FIBER MOSES 365 DFL

## (undated) DEVICE — SYR ONLY LUER LOCK 20CC

## (undated) DEVICE — DRAPE T CYSTOSCOPY STERILE

## (undated) DEVICE — SYR 10CC LUER LOCK

## (undated) DEVICE — SEE MEDLINE ITEM 154981

## (undated) DEVICE — GUIDEWIRE NITINOL HYBRID 150CM

## (undated) DEVICE — URETERO-RENOSCOPE FLEX-X POS

## (undated) DEVICE — Device

## (undated) DEVICE — BASKET STONE RETRV 1.9FRX120CM

## (undated) DEVICE — GOWN POLY REINF BRTH SLV XL

## (undated) DEVICE — SUPPORT ULNA NERVE PROTECTOR